# Patient Record
Sex: FEMALE | Race: WHITE | NOT HISPANIC OR LATINO | Employment: OTHER | URBAN - METROPOLITAN AREA
[De-identification: names, ages, dates, MRNs, and addresses within clinical notes are randomized per-mention and may not be internally consistent; named-entity substitution may affect disease eponyms.]

---

## 2020-06-09 ENCOUNTER — COMMUNICATION - HEALTHEAST (OUTPATIENT)
Dept: CARDIOLOGY | Facility: CLINIC | Age: 70
End: 2020-06-09

## 2020-09-17 ENCOUNTER — RECORDS - HEALTHEAST (OUTPATIENT)
Dept: LAB | Facility: CLINIC | Age: 70
End: 2020-09-17

## 2020-09-17 LAB
ALBUMIN SERPL-MCNC: 4 G/DL (ref 3.5–5)
ALP SERPL-CCNC: 75 U/L (ref 45–120)
ALT SERPL W P-5'-P-CCNC: <9 U/L (ref 0–45)
ANION GAP SERPL CALCULATED.3IONS-SCNC: 11 MMOL/L (ref 5–18)
AST SERPL W P-5'-P-CCNC: 20 U/L (ref 0–40)
BILIRUB SERPL-MCNC: 0.3 MG/DL (ref 0–1)
BUN SERPL-MCNC: 11 MG/DL (ref 8–28)
CALCIUM SERPL-MCNC: 9.4 MG/DL (ref 8.5–10.5)
CHLORIDE BLD-SCNC: 104 MMOL/L (ref 98–107)
CHOLEST SERPL-MCNC: 171 MG/DL
CO2 SERPL-SCNC: 27 MMOL/L (ref 22–31)
CREAT SERPL-MCNC: 0.76 MG/DL (ref 0.6–1.1)
FASTING STATUS PATIENT QL REPORTED: NORMAL
GFR SERPL CREATININE-BSD FRML MDRD: >60 ML/MIN/1.73M2
GLUCOSE BLD-MCNC: 107 MG/DL (ref 70–125)
HDLC SERPL-MCNC: 56 MG/DL
LDLC SERPL CALC-MCNC: 87 MG/DL
POTASSIUM BLD-SCNC: 4.2 MMOL/L (ref 3.5–5)
PROT SERPL-MCNC: 7.3 G/DL (ref 6–8)
SODIUM SERPL-SCNC: 142 MMOL/L (ref 136–145)
TRIGL SERPL-MCNC: 141 MG/DL

## 2020-09-18 LAB — 25(OH)D3 SERPL-MCNC: 54.7 NG/ML (ref 30–80)

## 2021-10-04 ENCOUNTER — LAB REQUISITION (OUTPATIENT)
Dept: LAB | Facility: CLINIC | Age: 71
End: 2021-10-04
Payer: MEDICARE

## 2021-10-04 DIAGNOSIS — E78.2 MIXED HYPERLIPIDEMIA: ICD-10-CM

## 2021-10-04 DIAGNOSIS — R82.90 UNSPECIFIED ABNORMAL FINDINGS IN URINE: ICD-10-CM

## 2021-10-04 LAB
ALBUMIN SERPL-MCNC: 3.9 G/DL (ref 3.5–5)
ALP SERPL-CCNC: 70 U/L (ref 45–120)
ALT SERPL W P-5'-P-CCNC: <9 U/L (ref 0–45)
ANION GAP SERPL CALCULATED.3IONS-SCNC: 14 MMOL/L (ref 5–18)
AST SERPL W P-5'-P-CCNC: 19 U/L (ref 0–40)
BILIRUB SERPL-MCNC: 0.3 MG/DL (ref 0–1)
BUN SERPL-MCNC: 13 MG/DL (ref 8–28)
CALCIUM SERPL-MCNC: 9.8 MG/DL (ref 8.5–10.5)
CHLORIDE BLD-SCNC: 101 MMOL/L (ref 98–107)
CHOLEST SERPL-MCNC: 165 MG/DL
CO2 SERPL-SCNC: 25 MMOL/L (ref 22–31)
CREAT SERPL-MCNC: 0.79 MG/DL (ref 0.6–1.1)
GFR SERPL CREATININE-BSD FRML MDRD: 74 ML/MIN/1.73M2
GLUCOSE BLD-MCNC: 111 MG/DL (ref 70–125)
HDLC SERPL-MCNC: 56 MG/DL
LDLC SERPL CALC-MCNC: 81 MG/DL
POTASSIUM BLD-SCNC: 3.5 MMOL/L (ref 3.5–5)
PROT SERPL-MCNC: 7.4 G/DL (ref 6–8)
SODIUM SERPL-SCNC: 140 MMOL/L (ref 136–145)
TRIGL SERPL-MCNC: 141 MG/DL

## 2021-10-04 PROCEDURE — 80053 COMPREHEN METABOLIC PANEL: CPT | Mod: ORL | Performed by: FAMILY MEDICINE

## 2021-10-04 PROCEDURE — 80061 LIPID PANEL: CPT | Mod: ORL | Performed by: FAMILY MEDICINE

## 2021-10-04 PROCEDURE — 87086 URINE CULTURE/COLONY COUNT: CPT | Mod: ORL | Performed by: FAMILY MEDICINE

## 2021-10-07 LAB — BACTERIA UR CULT: ABNORMAL

## 2022-10-03 ENCOUNTER — HOSPITAL ENCOUNTER (INPATIENT)
Facility: HOSPITAL | Age: 72
LOS: 6 days | Discharge: HOME | DRG: 193 | End: 2022-10-09
Attending: EMERGENCY MEDICINE | Admitting: INTERNAL MEDICINE
Payer: MEDICARE

## 2022-10-03 ENCOUNTER — OFFICE VISIT (OUTPATIENT)
Dept: FAMILY MEDICINE | Facility: CLINIC | Age: 72
End: 2022-10-03
Payer: MEDICARE

## 2022-10-03 ENCOUNTER — APPOINTMENT (OUTPATIENT)
Dept: RADIOLOGY | Facility: HOSPITAL | Age: 72
DRG: 193 | End: 2022-10-03
Attending: EMERGENCY MEDICINE
Payer: MEDICARE

## 2022-10-03 VITALS
DIASTOLIC BLOOD PRESSURE: 87 MMHG | OXYGEN SATURATION: 88 % | SYSTOLIC BLOOD PRESSURE: 152 MMHG | TEMPERATURE: 98.2 F | HEART RATE: 96 BPM | RESPIRATION RATE: 14 BRPM

## 2022-10-03 DIAGNOSIS — J18.9 PNEUMONIA DUE TO INFECTIOUS ORGANISM, UNSPECIFIED LATERALITY, UNSPECIFIED PART OF LUNG: Primary | ICD-10-CM

## 2022-10-03 DIAGNOSIS — J45.21 MILD INTERMITTENT ASTHMA WITH EXACERBATION: Primary | ICD-10-CM

## 2022-10-03 DIAGNOSIS — R09.02 HYPOXIA: ICD-10-CM

## 2022-10-03 DIAGNOSIS — I10 ESSENTIAL HYPERTENSION: ICD-10-CM

## 2022-10-03 DIAGNOSIS — J18.9 COMMUNITY ACQUIRED PNEUMONIA, UNSPECIFIED LATERALITY: ICD-10-CM

## 2022-10-03 LAB
ANION GAP SERPL CALCULATED.3IONS-SCNC: 16 MMOL/L (ref 7–15)
BUN SERPL-MCNC: 11.1 MG/DL (ref 8–23)
CALCIUM SERPL-MCNC: 9.2 MG/DL (ref 8.8–10.2)
CHLORIDE SERPL-SCNC: 90 MMOL/L (ref 98–107)
CREAT SERPL-MCNC: 0.72 MG/DL (ref 0.51–0.95)
DEPRECATED HCO3 PLAS-SCNC: 28 MMOL/L (ref 22–29)
ERYTHROCYTE [DISTWIDTH] IN BLOOD BY AUTOMATED COUNT: 13.3 % (ref 10–15)
FLUAV RNA SPEC QL NAA+PROBE: NEGATIVE
FLUBV RNA RESP QL NAA+PROBE: NEGATIVE
GFR SERPL CREATININE-BSD FRML MDRD: 87 ML/MIN/1.73M2
GLUCOSE SERPL-MCNC: 116 MG/DL (ref 70–99)
HCT VFR BLD AUTO: 36.7 % (ref 35–47)
HGB BLD-MCNC: 12.2 G/DL (ref 11.7–15.7)
MCH RBC QN AUTO: 30.9 PG (ref 26.5–33)
MCHC RBC AUTO-ENTMCNC: 33.2 G/DL (ref 31.5–36.5)
MCV RBC AUTO: 93 FL (ref 78–100)
PLATELET # BLD AUTO: 440 10E3/UL (ref 150–450)
POTASSIUM SERPL-SCNC: 3.7 MMOL/L (ref 3.4–5.3)
RBC # BLD AUTO: 3.95 10E6/UL (ref 3.8–5.2)
RSV RNA SPEC NAA+PROBE: NEGATIVE
SARS-COV-2 RNA RESP QL NAA+PROBE: NEGATIVE
SODIUM SERPL-SCNC: 134 MMOL/L (ref 136–145)
WBC # BLD AUTO: 18.8 10E3/UL (ref 4–11)

## 2022-10-03 PROCEDURE — 94640 AIRWAY INHALATION TREATMENT: CPT | Mod: 76

## 2022-10-03 PROCEDURE — 96367 TX/PROPH/DG ADDL SEQ IV INF: CPT

## 2022-10-03 PROCEDURE — 258N000003 HC RX IP 258 OP 636: Performed by: EMERGENCY MEDICINE

## 2022-10-03 PROCEDURE — 120N000001 HC R&B MED SURG/OB

## 2022-10-03 PROCEDURE — 96375 TX/PRO/DX INJ NEW DRUG ADDON: CPT

## 2022-10-03 PROCEDURE — 99285 EMERGENCY DEPT VISIT HI MDM: CPT | Mod: 25

## 2022-10-03 PROCEDURE — 85027 COMPLETE CBC AUTOMATED: CPT | Performed by: EMERGENCY MEDICINE

## 2022-10-03 PROCEDURE — 250N000009 HC RX 250: Performed by: EMERGENCY MEDICINE

## 2022-10-03 PROCEDURE — 96366 THER/PROPH/DIAG IV INF ADDON: CPT

## 2022-10-03 PROCEDURE — 99223 1ST HOSP IP/OBS HIGH 75: CPT | Performed by: INTERNAL MEDICINE

## 2022-10-03 PROCEDURE — 250N000009 HC RX 250: Performed by: INTERNAL MEDICINE

## 2022-10-03 PROCEDURE — 250N000011 HC RX IP 250 OP 636: Performed by: EMERGENCY MEDICINE

## 2022-10-03 PROCEDURE — 250N000013 HC RX MED GY IP 250 OP 250 PS 637: Performed by: INTERNAL MEDICINE

## 2022-10-03 PROCEDURE — 36415 COLL VENOUS BLD VENIPUNCTURE: CPT | Performed by: EMERGENCY MEDICINE

## 2022-10-03 PROCEDURE — 94640 AIRWAY INHALATION TREATMENT: CPT

## 2022-10-03 PROCEDURE — 999N000157 HC STATISTIC RCP TIME EA 10 MIN

## 2022-10-03 PROCEDURE — 96365 THER/PROPH/DIAG IV INF INIT: CPT

## 2022-10-03 PROCEDURE — C9803 HOPD COVID-19 SPEC COLLECT: HCPCS

## 2022-10-03 PROCEDURE — 99207 PR INPT ADMISSION FROM CLINIC: CPT | Performed by: FAMILY MEDICINE

## 2022-10-03 PROCEDURE — 87637 SARSCOV2&INF A&B&RSV AMP PRB: CPT | Performed by: EMERGENCY MEDICINE

## 2022-10-03 PROCEDURE — 93005 ELECTROCARDIOGRAM TRACING: CPT | Performed by: EMERGENCY MEDICINE

## 2022-10-03 PROCEDURE — 250N000011 HC RX IP 250 OP 636: Performed by: INTERNAL MEDICINE

## 2022-10-03 PROCEDURE — 82310 ASSAY OF CALCIUM: CPT | Performed by: EMERGENCY MEDICINE

## 2022-10-03 PROCEDURE — 71046 X-RAY EXAM CHEST 2 VIEWS: CPT

## 2022-10-03 PROCEDURE — 96376 TX/PRO/DX INJ SAME DRUG ADON: CPT

## 2022-10-03 RX ORDER — ROSUVASTATIN CALCIUM 5 MG/1
5 TABLET, COATED ORAL AT BEDTIME
COMMUNITY
Start: 2022-08-25

## 2022-10-03 RX ORDER — ASPIRIN 81 MG/1
81 TABLET ORAL AT BEDTIME
Status: ON HOLD | COMMUNITY
End: 2023-05-25

## 2022-10-03 RX ORDER — TRAZODONE HYDROCHLORIDE 50 MG/1
100 TABLET, FILM COATED ORAL AT BEDTIME
Status: DISCONTINUED | OUTPATIENT
Start: 2022-10-03 | End: 2022-10-09 | Stop reason: HOSPADM

## 2022-10-03 RX ORDER — ASPIRIN 81 MG/1
81 TABLET ORAL AT BEDTIME
Status: DISCONTINUED | OUTPATIENT
Start: 2022-10-03 | End: 2022-10-09 | Stop reason: HOSPADM

## 2022-10-03 RX ORDER — IBUPROFEN 200 MG
1 CAPSULE ORAL
COMMUNITY

## 2022-10-03 RX ORDER — ROSUVASTATIN CALCIUM 5 MG/1
5 TABLET, COATED ORAL AT BEDTIME
Status: DISCONTINUED | OUTPATIENT
Start: 2022-10-03 | End: 2022-10-09 | Stop reason: HOSPADM

## 2022-10-03 RX ORDER — IPRATROPIUM BROMIDE AND ALBUTEROL SULFATE 2.5; .5 MG/3ML; MG/3ML
3 SOLUTION RESPIRATORY (INHALATION) 4 TIMES DAILY PRN
Status: DISCONTINUED | OUTPATIENT
Start: 2022-10-03 | End: 2022-10-09 | Stop reason: HOSPADM

## 2022-10-03 RX ORDER — CEFTRIAXONE 1 G/1
1 INJECTION, POWDER, FOR SOLUTION INTRAMUSCULAR; INTRAVENOUS EVERY 24 HOURS
Status: DISCONTINUED | OUTPATIENT
Start: 2022-10-04 | End: 2022-10-08

## 2022-10-03 RX ORDER — METHYLPREDNISOLONE SODIUM SUCCINATE 40 MG/ML
40 INJECTION, POWDER, LYOPHILIZED, FOR SOLUTION INTRAMUSCULAR; INTRAVENOUS EVERY 8 HOURS
Status: DISCONTINUED | OUTPATIENT
Start: 2022-10-03 | End: 2022-10-07

## 2022-10-03 RX ORDER — IPRATROPIUM BROMIDE AND ALBUTEROL SULFATE 2.5; .5 MG/3ML; MG/3ML
3 SOLUTION RESPIRATORY (INHALATION)
Status: COMPLETED | OUTPATIENT
Start: 2022-10-03 | End: 2022-10-04

## 2022-10-03 RX ORDER — ENOXAPARIN SODIUM 100 MG/ML
40 INJECTION SUBCUTANEOUS EVERY 24 HOURS
Status: DISCONTINUED | OUTPATIENT
Start: 2022-10-03 | End: 2022-10-09 | Stop reason: HOSPADM

## 2022-10-03 RX ORDER — ONDANSETRON 4 MG/1
4 TABLET, ORALLY DISINTEGRATING ORAL EVERY 6 HOURS PRN
Status: DISCONTINUED | OUTPATIENT
Start: 2022-10-03 | End: 2022-10-09 | Stop reason: HOSPADM

## 2022-10-03 RX ORDER — LIDOCAINE 40 MG/G
CREAM TOPICAL
Status: DISCONTINUED | OUTPATIENT
Start: 2022-10-03 | End: 2022-10-09 | Stop reason: HOSPADM

## 2022-10-03 RX ORDER — CEFTRIAXONE 1 G/1
1 INJECTION, POWDER, FOR SOLUTION INTRAMUSCULAR; INTRAVENOUS ONCE
Status: COMPLETED | OUTPATIENT
Start: 2022-10-03 | End: 2022-10-03

## 2022-10-03 RX ORDER — METHYLPREDNISOLONE SODIUM SUCCINATE 40 MG/ML
40 INJECTION, POWDER, LYOPHILIZED, FOR SOLUTION INTRAMUSCULAR; INTRAVENOUS EVERY 12 HOURS
Status: DISCONTINUED | OUTPATIENT
Start: 2022-10-04 | End: 2022-10-03

## 2022-10-03 RX ORDER — TRAZODONE HYDROCHLORIDE 100 MG/1
100 TABLET ORAL AT BEDTIME
COMMUNITY
Start: 2022-08-24

## 2022-10-03 RX ORDER — LORAZEPAM 0.5 MG
1 TABLET ORAL DAILY
COMMUNITY

## 2022-10-03 RX ORDER — METHYLPREDNISOLONE SODIUM SUCCINATE 125 MG/2ML
80 INJECTION, POWDER, LYOPHILIZED, FOR SOLUTION INTRAMUSCULAR; INTRAVENOUS ONCE
Status: COMPLETED | OUTPATIENT
Start: 2022-10-03 | End: 2022-10-03

## 2022-10-03 RX ORDER — ACETAMINOPHEN 325 MG/1
650 TABLET ORAL EVERY 4 HOURS PRN
Status: DISCONTINUED | OUTPATIENT
Start: 2022-10-03 | End: 2022-10-09 | Stop reason: HOSPADM

## 2022-10-03 RX ORDER — ONDANSETRON 2 MG/ML
4 INJECTION INTRAMUSCULAR; INTRAVENOUS EVERY 6 HOURS PRN
Status: DISCONTINUED | OUTPATIENT
Start: 2022-10-03 | End: 2022-10-09 | Stop reason: HOSPADM

## 2022-10-03 RX ORDER — MULTIVITAMIN,THERAPEUTIC
1 TABLET ORAL DAILY
COMMUNITY

## 2022-10-03 RX ORDER — GUAIFENESIN/DEXTROMETHORPHAN 100-10MG/5
10 SYRUP ORAL EVERY 4 HOURS PRN
Status: DISCONTINUED | OUTPATIENT
Start: 2022-10-03 | End: 2022-10-09 | Stop reason: HOSPADM

## 2022-10-03 RX ORDER — IPRATROPIUM BROMIDE AND ALBUTEROL SULFATE 2.5; .5 MG/3ML; MG/3ML
3 SOLUTION RESPIRATORY (INHALATION) ONCE
Status: COMPLETED | OUTPATIENT
Start: 2022-10-03 | End: 2022-10-03

## 2022-10-03 RX ORDER — NYSTATIN 100000 [USP'U]/G
1 POWDER TOPICAL 2 TIMES DAILY PRN
COMMUNITY
Start: 2022-08-18 | End: 2022-12-09

## 2022-10-03 RX ADMIN — TRAZODONE HYDROCHLORIDE 100 MG: 100 TABLET ORAL at 21:36

## 2022-10-03 RX ADMIN — CEFTRIAXONE SODIUM 1 G: 1 INJECTION, POWDER, FOR SOLUTION INTRAMUSCULAR; INTRAVENOUS at 15:03

## 2022-10-03 RX ADMIN — IPRATROPIUM BROMIDE AND ALBUTEROL SULFATE 3 ML: 2.5; .5 SOLUTION RESPIRATORY (INHALATION) at 13:21

## 2022-10-03 RX ADMIN — ENOXAPARIN SODIUM 40 MG: 40 INJECTION SUBCUTANEOUS at 21:38

## 2022-10-03 RX ADMIN — METHYLPREDNISOLONE SODIUM SUCCINATE 81.25 MG: 125 INJECTION, POWDER, FOR SOLUTION INTRAMUSCULAR; INTRAVENOUS at 13:20

## 2022-10-03 RX ADMIN — ASPIRIN 81 MG: 81 TABLET, COATED ORAL at 21:37

## 2022-10-03 RX ADMIN — METHYLPREDNISOLONE 40 MG: 40 INJECTION, POWDER, LYOPHILIZED, FOR SOLUTION INTRAMUSCULAR; INTRAVENOUS at 21:32

## 2022-10-03 RX ADMIN — AZITHROMYCIN 500 MG: 500 INJECTION, POWDER, LYOPHILIZED, FOR SOLUTION INTRAVENOUS at 15:50

## 2022-10-03 RX ADMIN — IPRATROPIUM BROMIDE AND ALBUTEROL SULFATE 3 ML: 2.5; .5 SOLUTION RESPIRATORY (INHALATION) at 21:21

## 2022-10-03 RX ADMIN — ROSUVASTATIN CALCIUM 5 MG: 5 TABLET, FILM COATED ORAL at 21:36

## 2022-10-03 ASSESSMENT — ACTIVITIES OF DAILY LIVING (ADL)
ADLS_ACUITY_SCORE: 35

## 2022-10-03 NOTE — PROGRESS NOTES
OUTPATIENT VISIT NOTE                                                   Date of Visit: 10/3/2022     Chief Complaint   Patient presents with:  Cough: Unable to sleep, nausea, vomiting, fatigue x10 days             History of Present Illness   Naila Talley is a 72 year old female with  and daughter in law in for cough for the last 10 days.  Feels shortness of breath since started coughing.  Fever to 102.  Has had sweats.  No ear pain.  Sore throat improved.  Cough is productive.  Stomach upset  Poor appetite.  Drinking a lot of water.  Normal urination.       MEDICATIONS   Current Outpatient Medications   Medication     rosuvastatin (CRESTOR) 5 MG tablet     traZODone (DESYREL) 100 MG tablet     No current facility-administered medications for this visit.         SOCIAL HISTORY   Social History     Tobacco Use     Smoking status: Not on file     Smokeless tobacco: Not on file   Substance Use Topics     Alcohol use: Not on file           Physical Exam   Vitals:    10/03/22 1052   BP: (!) 152/87   BP Location: Left arm   Patient Position: Sitting   Cuff Size: Adult Regular   Pulse: 96   Resp: 14   Temp: 98.2  F (36.8  C)   TempSrc: Oral   SpO2: (!) 88%        GENERAL:   Alert. Oriented. Appears fatigued and slightly breathless  EYES: Clear  HENT:  Ears: R TM pearly gray. Normal landmarks. L TM pearly gray.  Normal landmarks  Nose: Clear.  Sinuses: Nontender.  Oropharynx:  No erythema. No exudate.  NECK: Supple. No adenopathy.  LUNGS: prolonged expiration with crackles, rhonchi throughout  HEART: RRR  SKIN:  No rash.          Assessment and Plan     Pneumonia due to infectious organism, unspecified laterality, unspecified part of lung      Hypoxia     Pneumonia by exam and history with hypoxia.  Patient sent to ER for further evaluation treatment.      Discussed signs / symptoms that warrant urgent / emergent medical attention.     Recheck if worsening or not improving.       Trae Mcconnell,  MD          Pertinent History     The following portions of the patient's history were reviewed and updated as appropriate: allergies, current medications, past family history, past medical history, past social history, past surgical history and problem list.

## 2022-10-03 NOTE — PHARMACY-ADMISSION MEDICATION HISTORY
Pharmacy Note - Admission Medication History    Pertinent Provider Information:      ______________________________________________________________________    Prior To Admission (PTA) med list completed and updated in EMR.       PTA Med List   Medication Sig Last Dose     aspirin 81 MG EC tablet Take 81 mg by mouth At Bedtime 10/2/2022 at Unknown time     calcium citrate (CITRACAL) 950 (200 Ca) MG tablet Take 1 tablet by mouth daily (with lunch) 10/2/2022 at Unknown time     cholecalciferol (VITAMIN D3) 25 mcg (1000 units) capsule Take 1 capsule by mouth daily (with dinner) 10/2/2022 at Unknown time     Multiple Vitamins-Minerals (OCUVITE PRESERVISION PO) Take 1 tablet by mouth daily (with dinner) 10/2/2022 at Unknown time     multivitamin, therapeutic (THERA-VIT) TABS tablet Take 1 tablet by mouth daily 10/2/2022 at Unknown time     nystatin (MYCOSTATIN) 491323 UNIT/GM external powder Apply 1 Application topically 2 times daily as needed prn     Omega-3-6-9 CAPS Take 1 capsule by mouth daily 10/2/2022 at Unknown time     rosuvastatin (CRESTOR) 5 MG tablet Take 5 mg by mouth At Bedtime 10/2/2022 at Unknown time     traZODone (DESYREL) 100 MG tablet Take 100 mg by mouth At Bedtime 10/2/2022 at Unknown time       Information source(s): Patient and [facility]  Method of interview communication: in-person    Summary of Changes to PTA Med List  New: all meds  Discontinued: n/a  Changed: n/a    Patient was asked about OTC/herbal products specifically.  PTA med list reflects this.    In the past week, patient estimated taking medication this percent of the time:  greater than 90%.    Allergies were reviewed, assessed, and updated with the patient.      Patient does not anticipate needing any multi-use medications during admission.    The information provided in this note is only as accurate as the sources available at the time of the update(s).    Thank you for the opportunity to participate in the care of  this patient.    Sherie Feldman [facility]  10/3/2022 2:19 PM

## 2022-10-03 NOTE — ED NOTES
Expected Patient Referral to ED  11:28 AM    Referring Clinic/Provider:  [facility]    Reason for referral/Clinical facts:  Patient with 10 days of productive cough, coarse lung sounds, hypoxia, concern for pneumonia.  No testing completed so far.    Recommendations provided:  Send to ED for further evaluation        Dwayne Craven MD  [facility] EMERGENCY DEPARTMENT  [address] [place] [phone]  [phone]       Dwayne Craven MD  10/03/22 1126

## 2022-10-03 NOTE — H&P
[facility]    History and Physical - Hospitalist Service       Date of Admission:  10/3/2022    Assessment & Plan      Naila Talley is a 72 year old female with a medical history of asthma presents to ER for cough and SOB for 10 days. Found to have community acquired pneumonia and hypoxia.     Community acquired pneumonia: Presents with fever, productive cough, SOB, hypoxia and leukocytosis. Chest XR reveals patchy opacities throughout the mid and lower lungs compatible with pneumonia. COVID19 negative.   - Started Ceftriaxone and Azithromycin in ER. Will continue   - Neb scheduled and prn   - Antitussive  - Sputum gram stain and culture    Acute asthma exacerbation: History of asthma. Patient reports that she uses her rescue inhaler occasionally. She has never needed to be treated with steroid.   - Start solumedrol 40 mg IV q8h. Nebs as above    Acute hypoxemic respiratory failure: due to pneumonia and asthma exacerbation  - Needed supplemental nasal cannula oxygen 2 LPM on admission. Will continue and taper as tolerated.         Diet:  Regular diet  DVT Prophylaxis: Enoxaparin (Lovenox) SQ  Liu Catheter: Not present  Central Lines: None  Cardiac Monitoring: None  Code Status:  Full code      Disposition Plan      Expected Discharge Date: 10/05/2022                The patient's care was discussed with the Bedside Nurse, Care Coordinator/ and Patient.    Kam Gaytan MD  Hospitalist Service  [facility]  Securely message with the Vocera Web Console (learn more here)  Text page via Daily Pic Paging/Directory         ______________________________________________________________________    Chief Complaint   Cough and SOB    History is obtained from the patient    History of Present Illness   Naila Talley is a 72 year old female with a medical history of asthma presents to ER for cough and SOB for 10 days. Patient reports that she developed productive  cough 10 days ago. He cough progressively gets worse. She further developed SOB and wheezing about one week. She reports feeling chills. She checked her temperature and it was 100.2. She reports no chest pain. She reports feeling nausea and vomited a few times. In ER, chest XR reveals pneumonia.      Review of Systems    The 10 point Review of Systems is negative other than noted in the HPI or here.     Past Medical History    I have reviewed this patient's medical history and updated it with pertinent information if needed.   Asthma    Past Surgical History   I have reviewed this patient's surgical history and updated it with pertinent information if needed.  No past surgical history on file.    Social History   I have reviewed this patient's social history and updated it with pertinent information if needed.       Family History   No pertinent family history    Prior to Admission Medications   Prior to Admission Medications   Prescriptions Last Dose Informant Patient Reported? Taking?   Multiple Vitamins-Minerals (OCUVITE PRESERVISION PO) 10/2/2022 at Unknown time  Yes Yes   Sig: Take 1 tablet by mouth daily (with dinner)   Omega-3-6-9 CAPS 10/2/2022 at Unknown time  Yes Yes   Sig: Take 1 capsule by mouth daily   aspirin 81 MG EC tablet 10/2/2022 at Unknown time  Yes Yes   Sig: Take 81 mg by mouth At Bedtime   calcium citrate (CITRACAL) 950 (200 Ca) MG tablet 10/2/2022 at Unknown time  Yes Yes   Sig: Take 1 tablet by mouth daily (with lunch)   cholecalciferol (VITAMIN D3) 25 mcg (1000 units) capsule 10/2/2022 at Unknown time  Yes Yes   Sig: Take 1 capsule by mouth daily (with dinner)   multivitamin, therapeutic (THERA-VIT) TABS tablet 10/2/2022 at Unknown time  Yes Yes   Sig: Take 1 tablet by mouth daily   nystatin (MYCOSTATIN) 891588 UNIT/GM external powder prn  Yes Yes   Sig: Apply 1 Application topically 2 times daily as needed   rosuvastatin (CRESTOR) 5 MG tablet 10/2/2022 at Unknown time  Yes Yes   Sig: Take  5 mg by mouth At Bedtime   traZODone (DESYREL) 100 MG tablet 10/2/2022 at Unknown time  Yes Yes   Sig: Take 100 mg by mouth At Bedtime      Facility-Administered Medications: None     Allergies   No Known Allergies    Physical Exam   Vital Signs: Temp: 98  F (36.7  C) Temp src: Oral BP: (!) 163/71 Pulse: 90   Resp: (!) 31 SpO2: 93 % O2 Device: Nasal cannula Oxygen Delivery: 2 LPM  Weight: 180 lbs 0 oz    General appearance: not in acute distress  HEENT: PERRL, EOMI  Lungs: Coarse breath sounds with wheezes in bilateral lung fields  Cardiovascular: Regular rate and rhythm, normal S1-S2  Abdomen: Soft, non tender, no distension, normal bowel sound  Musculoskeletal: No joint swelling  Skin: No rash and no edema  Neurology: AAO ×3.  Cranial nerves II - XII normal.  Normal muscle strength in all four extremities.    Data   Data reviewed today: I reviewed all medications, new labs and imaging results over the last 24 hours.     Recent Labs   Lab 10/03/22  1254   WBC 18.8*   HGB 12.2   MCV 93      *   POTASSIUM 3.7   CHLORIDE 90*   CO2 28   BUN 11.1   CR 0.72   ANIONGAP 16*   ROZINA 9.2   *       Chest XR:  IMPRESSION: Linear and ill-defined patchy opacities throughout the mid and lower lungs compatible with pneumonia in the proper clinical setting. No pleural fusion. Shallow inspiration and mild elevation left hemidiaphragm exaggerate heart size and   pulmonary vascularity which are probably within normal limits.

## 2022-10-03 NOTE — ED PROVIDER NOTES
EMERGENCY DEPARTMENT ENCOUNTER      NAME: Naila Talley  AGE: 72 year old female  YOB: 1950  MRN: [id number]  EVALUATION DATE & TIME: 10/3/2022 12:22 PM    PCP: Danelle Adam    ED PROVIDER: Dwayne Craven M.D.      Chief Complaint   Patient presents with     Cough       FINAL IMPRESSION:  1. Community acquired pneumonia, unspecified laterality        ED COURSE & MEDICAL DECISION MAKIN year old female presents to the Emergency Department for evaluation of cough and shortness of breath.  She has a productive cough for about 10 days.   is also sick with respiratory symptoms.  She is borderline hypoxemic to about 90% at rest here, does desat with any exertion less than 90%.  Lung sounds are coarse with expiratory wheezing.  She relays a remote history of asthma however does not take any normal controlling medications.  She did receive a DuoNeb and some Solu-Medrol with some improvement in her expiratory wheezing.  Chest x-ray does confirm bilateral opacities concerning for pneumonia.  Her COVID-19 test is negative.  Although her  is sick with similar symptoms, I wonder if she is developing some superimposed community-acquired pneumonia on top of a possible viral respiratory syndrome.  She does have an elevated white blood cell count 18.  She was started on treatment with ceftriaxone and azithromycin and will be admitted given her borderline oxygen saturations, increased work of breathing and age.  Discussed case with hospitalist    12:37 PM I met with the patient and performed my initial interview and exam  12:54 PM I rechecked the patient       At the conclusion of the encounter I discussed the results of all of the tests and the disposition. The questions were answered. The patient or family acknowledged understanding and was agreeable with the care plan.       MEDICATIONS GIVEN IN THE EMERGENCY:  Medications   aspirin EC tablet 81 mg (81 mg Oral Given 10/3/22 3317)    rosuvastatin (CRESTOR) tablet 5 mg (5 mg Oral Given 10/3/22 2136)   traZODone (DESYREL) tablet 100 mg (100 mg Oral Given 10/3/22 2136)   lidocaine 1 % 0.1-1 mL (has no administration in time range)   lidocaine (LMX4) cream (has no administration in time range)   sodium chloride (PF) 0.9% PF flush 3 mL (3 mLs Intracatheter Given 10/3/22 1826)   sodium chloride (PF) 0.9% PF flush 3 mL (has no administration in time range)   melatonin tablet 1 mg (has no administration in time range)   enoxaparin ANTICOAGULANT (LOVENOX) injection 40 mg (40 mg Subcutaneous Given 10/3/22 2138)   ondansetron (ZOFRAN ODT) ODT tab 4 mg (has no administration in time range)     Or   ondansetron (ZOFRAN) injection 4 mg (has no administration in time range)   azithromycin (ZITHROMAX) 500 mg in sodium chloride 0.9 % 250 mL intermittent infusion (has no administration in time range)   cefTRIAXone (ROCEPHIN) 1 g vial to attach to  mL bag for ADULTS or NS 50 mL bag for PEDS (has no administration in time range)   ipratropium - albuterol 0.5 mg/2.5 mg/3 mL (DUONEB) neb solution 3 mL (has no administration in time range)   acetaminophen (TYLENOL) tablet 650 mg (has no administration in time range)   methylPREDNISolone sodium succinate (solu-MEDROL) injection 40 mg (40 mg Intravenous Given 10/3/22 2132)   ipratropium - albuterol 0.5 mg/2.5 mg/3 mL (DUONEB) neb solution 3 mL (3 mLs Nebulization Given 10/3/22 2121)   guaiFENesin-dextromethorphan (ROBITUSSIN DM) 100-10 MG/5ML syrup 10 mL (has no administration in time range)   ipratropium - albuterol 0.5 mg/2.5 mg/3 mL (DUONEB) neb solution 3 mL (3 mLs Nebulization Given 10/3/22 1321)   methylPREDNISolone sodium succinate (solu-MEDROL) injection 81.25 mg (81.25 mg Intravenous Given 10/3/22 1320)   cefTRIAXone (ROCEPHIN) 1 g vial to attach to  mL bag for ADULTS or NS 50 mL bag for PEDS (0 g Intravenous Stopped 10/3/22 4382)   azithromycin (ZITHROMAX) 500 mg in sodium chloride 0.9 % 250 mL  intermittent infusion (0 mg Intravenous Stopped 10/3/22 0715)       NEW PRESCRIPTIONS STARTED AT TODAY'S ER VISIT  New Prescriptions    No medications on file          =================================================================    HPI    Patient information was obtained from: Patient     Use of : N/A       Naila Talley is a 72 year old female with pertinent history of asthma who presents to this ED walk-in for evaluation of cough     Patient reports being sick with a productive cough (with gray/green sputum) and shortness of breath (worsens with exertion) for 10 days. The patient also reports GI upset with a few episodes of emesis. Patient reports not using their inhaler for a while. Patient is fully vaccinated against COVID. Patient denies chest pain, leg swelling, or fever.     REVIEW OF SYSTEMS   All systems reviewed and negative except as noted in HPI.    PAST MEDICAL HISTORY:  No past medical history on file.    PAST SURGICAL HISTORY:  No past surgical history on file.        CURRENT MEDICATIONS:    Current Facility-Administered Medications   Medication     acetaminophen (TYLENOL) tablet 650 mg     aspirin EC tablet 81 mg     [START ON 10/4/2022] azithromycin (ZITHROMAX) 500 mg in sodium chloride 0.9 % 250 mL intermittent infusion     [START ON 10/4/2022] cefTRIAXone (ROCEPHIN) 1 g vial to attach to  mL bag for ADULTS or NS 50 mL bag for PEDS     enoxaparin ANTICOAGULANT (LOVENOX) injection 40 mg     guaiFENesin-dextromethorphan (ROBITUSSIN DM) 100-10 MG/5ML syrup 10 mL     ipratropium - albuterol 0.5 mg/2.5 mg/3 mL (DUONEB) neb solution 3 mL     ipratropium - albuterol 0.5 mg/2.5 mg/3 mL (DUONEB) neb solution 3 mL     lidocaine (LMX4) cream     lidocaine 1 % 0.1-1 mL     melatonin tablet 1 mg     methylPREDNISolone sodium succinate (solu-MEDROL) injection 40 mg     ondansetron (ZOFRAN ODT) ODT tab 4 mg    Or     ondansetron (ZOFRAN) injection 4 mg     rosuvastatin (CRESTOR) tablet 5  "mg     sodium chloride (PF) 0.9% PF flush 3 mL     sodium chloride (PF) 0.9% PF flush 3 mL     traZODone (DESYREL) tablet 100 mg     Current Outpatient Medications   Medication     aspirin 81 MG EC tablet     calcium citrate (CITRACAL) 950 (200 Ca) MG tablet     cholecalciferol (VITAMIN D3) 25 mcg (1000 units) capsule     Multiple Vitamins-Minerals (OCUVITE PRESERVISION PO)     multivitamin, therapeutic (THERA-VIT) TABS tablet     nystatin (MYCOSTATIN) 899083 UNIT/GM external powder     Omega-3-6-9 CAPS     rosuvastatin (CRESTOR) 5 MG tablet     traZODone (DESYREL) 100 MG tablet         ALLERGIES:  No Known Allergies    FAMILY HISTORY:  No family history on file.    SOCIAL HISTORY:   Social History     Socioeconomic History     Marital status:        VITALS:  BP (!) 144/67   Pulse 107   Temp 98  F (36.7  C) (Oral)   Resp 18   Ht 1.651 m (5' 5\")   Wt 81.6 kg (180 lb)   SpO2 94%   BMI 29.95 kg/m      PHYSICAL EXAM    Constitutional: Well developed, Well nourished, mild respiratory distress  HENT: Normocephalic, Atraumatic. Neck Supple.  Eyes: EOMI, Conjunctiva normal.  Respiratory: Mildly increased work of breathing.  Expiratory wheezing in all lung fields and coarse lung sounds throughout.  Cardiovascular: Normal heart rate, Regular rhythm. No peripheral edema.  Abdomen: Soft  Musculoskeletal: Good range of motion in all major joints. No major deformities noted.  Integument: Warm, Dry.  Neurologic: Alert & awake, Normal motor function, Normal sensory function, No focal deficits noted.   Psychiatric: Cooperative. Affect appropriate.     LAB:  All pertinent labs reviewed and interpreted.  Labs Ordered and Resulted from Time of ED Arrival to Time of ED Departure   CBC WITH PLATELETS - Abnormal       Result Value    WBC Count 18.8 (*)     RBC Count 3.95      Hemoglobin 12.2      Hematocrit 36.7      MCV 93      MCH 30.9      MCHC 33.2      RDW 13.3      Platelet Count 440     BASIC METABOLIC PANEL - " Abnormal    Sodium 134 (*)     Potassium 3.7      Chloride 90 (*)     Carbon Dioxide (CO2) 28      Anion Gap 16 (*)     Urea Nitrogen 11.1      Creatinine 0.72      Calcium 9.2      Glucose 116 (*)     GFR Estimate 87     INFLUENZA A/B & SARS-COV2 PCR MULTIPLEX - Normal    Influenza A PCR Negative      Influenza B PCR Negative      RSV PCR Negative      SARS CoV2 PCR Negative     RESPIRATORY AEROBIC BACTERIAL CULTURE       RADIOLOGY:  Reviewed all pertinent imaging. Please see official radiology report.  XR Chest 2 Views   Final Result   IMPRESSION: Linear and ill-defined patchy opacities throughout the mid and lower lungs compatible with pneumonia in the proper clinical setting. No pleural fusion. Shallow inspiration and mild elevation left hemidiaphragm exaggerate heart size and    pulmonary vascularity which are probably within normal limits.          EKG:    Performed at: 1220    Impression: Normal EKG    Rate: 97  Rhythm: Normal sinus rhythm   Axis: 65  TX Interval: 128  QRS Interval: 72  QTc Interval: 434  ST Changes: None  Comparison: N/A    I have independently reviewed and interpreted the EKG(s) documented above.        I, Roxann Vargas, am serving as a scribe to document services personally performed by Dr. Dwayne Craven, based on my observation and the provider's statements to me. I, Dwayne Craven MD attest that Roxann Vargas is acting in a scribe capacity, has observed my performance of the services and has documented them in accordance with my direction.    Dwayne Craven M.D.  Emergency Medicine  [facility] EMERGENCY DEPARTMENT  [address] [place] [id number] [phone]  Dept: [phone]     Dwayne Craven MD  10/03/22 7824

## 2022-10-03 NOTE — ED TRIAGE NOTES
Pt was at clinic across the street and sent over for evaluation of cough. Pt c/o cough x 10 days. Pt sent here for eval for pneumonia and told she had low o2 sats. O2 in triage is 92%. Pt c/o low sleep.

## 2022-10-04 LAB
ATRIAL RATE - MUSE: 97 BPM
DIASTOLIC BLOOD PRESSURE - MUSE: NORMAL MMHG
ERYTHROCYTE [DISTWIDTH] IN BLOOD BY AUTOMATED COUNT: 13.5 % (ref 10–15)
HCT VFR BLD AUTO: 34.4 % (ref 35–47)
HGB BLD-MCNC: 11.4 G/DL (ref 11.7–15.7)
HOLD SPECIMEN: NORMAL
INTERPRETATION ECG - MUSE: NORMAL
MCH RBC QN AUTO: 31.1 PG (ref 26.5–33)
MCHC RBC AUTO-ENTMCNC: 33.1 G/DL (ref 31.5–36.5)
MCV RBC AUTO: 94 FL (ref 78–100)
P AXIS - MUSE: 8 DEGREES
PLATELET # BLD AUTO: 415 10E3/UL (ref 150–450)
PR INTERVAL - MUSE: 128 MS
QRS DURATION - MUSE: 72 MS
QT - MUSE: 342 MS
QTC - MUSE: 434 MS
R AXIS - MUSE: 65 DEGREES
RBC # BLD AUTO: 3.67 10E6/UL (ref 3.8–5.2)
SYSTOLIC BLOOD PRESSURE - MUSE: NORMAL MMHG
T AXIS - MUSE: 70 DEGREES
VENTRICULAR RATE- MUSE: 97 BPM
WBC # BLD AUTO: 16.7 10E3/UL (ref 4–11)

## 2022-10-04 PROCEDURE — 999N000157 HC STATISTIC RCP TIME EA 10 MIN

## 2022-10-04 PROCEDURE — 99233 SBSQ HOSP IP/OBS HIGH 50: CPT | Performed by: INTERNAL MEDICINE

## 2022-10-04 PROCEDURE — 120N000001 HC R&B MED SURG/OB

## 2022-10-04 PROCEDURE — 96376 TX/PRO/DX INJ SAME DRUG ADON: CPT

## 2022-10-04 PROCEDURE — 250N000009 HC RX 250: Performed by: INTERNAL MEDICINE

## 2022-10-04 PROCEDURE — 250N000011 HC RX IP 250 OP 636: Performed by: INTERNAL MEDICINE

## 2022-10-04 PROCEDURE — 36415 COLL VENOUS BLD VENIPUNCTURE: CPT | Performed by: INTERNAL MEDICINE

## 2022-10-04 PROCEDURE — 258N000003 HC RX IP 258 OP 636: Performed by: INTERNAL MEDICINE

## 2022-10-04 PROCEDURE — 85027 COMPLETE CBC AUTOMATED: CPT | Performed by: INTERNAL MEDICINE

## 2022-10-04 PROCEDURE — 94640 AIRWAY INHALATION TREATMENT: CPT

## 2022-10-04 PROCEDURE — 250N000013 HC RX MED GY IP 250 OP 250 PS 637: Performed by: INTERNAL MEDICINE

## 2022-10-04 PROCEDURE — 94640 AIRWAY INHALATION TREATMENT: CPT | Mod: 76

## 2022-10-04 RX ORDER — HYDRALAZINE HYDROCHLORIDE 20 MG/ML
10 INJECTION INTRAMUSCULAR; INTRAVENOUS EVERY 6 HOURS PRN
Status: DISCONTINUED | OUTPATIENT
Start: 2022-10-04 | End: 2022-10-09 | Stop reason: HOSPADM

## 2022-10-04 RX ADMIN — ROSUVASTATIN CALCIUM 5 MG: 5 TABLET, FILM COATED ORAL at 21:45

## 2022-10-04 RX ADMIN — GUAIFENESIN AND DEXTROMETHORPHAN 10 ML: 100; 10 SYRUP ORAL at 21:45

## 2022-10-04 RX ADMIN — IPRATROPIUM BROMIDE AND ALBUTEROL SULFATE 3 ML: 2.5; .5 SOLUTION RESPIRATORY (INHALATION) at 07:07

## 2022-10-04 RX ADMIN — METHYLPREDNISOLONE 40 MG: 40 INJECTION, POWDER, LYOPHILIZED, FOR SOLUTION INTRAMUSCULAR; INTRAVENOUS at 14:04

## 2022-10-04 RX ADMIN — GUAIFENESIN AND DEXTROMETHORPHAN 10 ML: 100; 10 SYRUP ORAL at 09:51

## 2022-10-04 RX ADMIN — AZITHROMYCIN 500 MG: 500 INJECTION, POWDER, LYOPHILIZED, FOR SOLUTION INTRAVENOUS at 14:52

## 2022-10-04 RX ADMIN — METHYLPREDNISOLONE 40 MG: 40 INJECTION, POWDER, LYOPHILIZED, FOR SOLUTION INTRAMUSCULAR; INTRAVENOUS at 05:55

## 2022-10-04 RX ADMIN — ENOXAPARIN SODIUM 40 MG: 40 INJECTION SUBCUTANEOUS at 20:14

## 2022-10-04 RX ADMIN — IPRATROPIUM BROMIDE AND ALBUTEROL SULFATE 3 ML: 2.5; .5 SOLUTION RESPIRATORY (INHALATION) at 11:01

## 2022-10-04 RX ADMIN — ACETAMINOPHEN 650 MG: 325 TABLET, FILM COATED ORAL at 09:51

## 2022-10-04 RX ADMIN — IPRATROPIUM BROMIDE AND ALBUTEROL SULFATE 3 ML: 2.5; .5 SOLUTION RESPIRATORY (INHALATION) at 15:57

## 2022-10-04 RX ADMIN — ASPIRIN 81 MG: 81 TABLET, COATED ORAL at 21:45

## 2022-10-04 RX ADMIN — TRAZODONE HYDROCHLORIDE 100 MG: 100 TABLET ORAL at 21:45

## 2022-10-04 RX ADMIN — CEFTRIAXONE SODIUM 1 G: 1 INJECTION, POWDER, FOR SOLUTION INTRAMUSCULAR; INTRAVENOUS at 13:41

## 2022-10-04 RX ADMIN — METHYLPREDNISOLONE 40 MG: 40 INJECTION, POWDER, LYOPHILIZED, FOR SOLUTION INTRAMUSCULAR; INTRAVENOUS at 20:12

## 2022-10-04 ASSESSMENT — ACTIVITIES OF DAILY LIVING (ADL)
ADLS_ACUITY_SCORE: 35
CHANGE_IN_FUNCTIONAL_STATUS_SINCE_ONSET_OF_CURRENT_ILLNESS/INJURY: NO
FALL_HISTORY_WITHIN_LAST_SIX_MONTHS: NO
DIFFICULTY_EATING/SWALLOWING: NO
DOING_ERRANDS_INDEPENDENTLY_DIFFICULTY: NO
ADLS_ACUITY_SCORE: 24
ADLS_ACUITY_SCORE: 35
DIFFICULTY_COMMUNICATING: NO
ADLS_ACUITY_SCORE: 24
WEAR_GLASSES_OR_BLIND: YES
ADLS_ACUITY_SCORE: 35
ADLS_ACUITY_SCORE: 38
TOILETING_ISSUES: NO
ADLS_ACUITY_SCORE: 35
CONCENTRATING,_REMEMBERING_OR_MAKING_DECISIONS_DIFFICULTY: NO
ADLS_ACUITY_SCORE: 23
ADLS_ACUITY_SCORE: 24
ADLS_ACUITY_SCORE: 23
ADLS_ACUITY_SCORE: 35
DRESSING/BATHING_DIFFICULTY: NO
WALKING_OR_CLIMBING_STAIRS_DIFFICULTY: NO
VISION_MANAGEMENT: GOOD
HEARING_DIFFICULTY_OR_DEAF: NO
ADLS_ACUITY_SCORE: 35

## 2022-10-04 NOTE — PLAN OF CARE
"  Problem: Plan of Care - These are the overarching goals to be used throughout the patient stay.    Goal: Plan of Care Review/Shift Note  Description: The Plan of Care Review/Shift note should be completed every shift.  The Outcome Evaluation is a brief statement about your assessment that the patient is improving, declining, or no change.  This information will be displayed automatically on your shift note.  Outcome: Ongoing, Progressing  Flowsheets (Taken 10/4/2022 144)  Plan of Care Reviewed With:   patient   spouse  Goal: Patient-Specific Goal (Individualized)  Description: You can add care plan individualizations to a care plan. Examples of Individualization might be:  \"Parent requests to be called daily at 9am for status\", \"I have a hard time hearing out of my right ear\", or \"Do not touch me to wake me up as it startles me\".  Outcome: Ongoing, Progressing  Goal: Absence of Hospital-Acquired Illness or Injury  Outcome: Ongoing, Progressing  Intervention: Identify and Manage Fall Risk  Recent Flowsheet Documentation  Taken 10/4/2022 1441 by Grayson Styles, RN  Safety Promotion/Fall Prevention: activity supervised  Goal: Optimal Comfort and Wellbeing  Outcome: Ongoing, Progressing  Goal: Readiness for Transition of Care  Outcome: Ongoing, Progressing  Intervention: Mutually Develop Transition Plan  Recent Flowsheet Documentation  Taken 10/4/2022 1444 by Grayson Styles, RN  Equipment Currently Used at Home: none     Problem: Risk for Delirium  Goal: Optimal Coping  Outcome: Ongoing, Progressing  Goal: Improved Behavioral Control  Outcome: Ongoing, Progressing  Goal: Improved Attention and Thought Clarity  Outcome: Ongoing, Progressing  Goal: Improved Sleep  Outcome: Ongoing, Progressing   Goal Outcome Evaluation:    Plan of Care Reviewed With: patient, spouse                 "

## 2022-10-04 NOTE — PROGRESS NOTES
"Sitting up in chair, O2 4 lpm/NC, SpO2 94 %, BS scattered moderate I/E wheezes/rhonchi, crackles LLL. Duoneb tx given, tolerated well, BS unchanged afte. RT to follow    /65 (BP Location: Right arm)   Pulse 81   Temp 97.6  F (36.4  C) (Oral)   Resp 18   Ht 1.651 m (5' 5\")   Wt 81.6 kg (179 lb 14.3 oz)   SpO2 94%   BMI 29.94 kg/m      "

## 2022-10-04 NOTE — PLAN OF CARE
Problem: Plan of Care - These are the overarching goals to be used throughout the patient stay.    Goal: Absence of Hospital-Acquired Illness or Injury  Outcome: Ongoing, Progressing  Intervention: Identify and Manage Fall Risk  Recent Flowsheet Documentation  Taken 10/4/2022 0945 by Constance Lane RN  Safety Promotion/Fall Prevention:   activity supervised   assistive device/personal items within reach   nonskid shoes/slippers when out of bed   patient and family education   room door open   room near nurse's station   safety round/check completed  Intervention: Prevent Skin Injury  Recent Flowsheet Documentation  Taken 10/4/2022 0945 by Constance Lane RN  Body Position: position changed independently  Intervention: Prevent and Manage VTE (Venous Thromboembolism) Risk  Recent Flowsheet Documentation  Taken 10/4/2022 0945 by Constance Lane RN  Activity Management:   activity adjusted per tolerance   bedrest with bathroom privileges     Problem: Plan of Care - These are the overarching goals to be used throughout the patient stay.    Goal: Absence of Hospital-Acquired Illness or Injury  Intervention: Prevent Skin Injury  Recent Flowsheet Documentation  Taken 10/4/2022 0945 by Constance Lane RN  Body Position: position changed independently     Problem: Plan of Care - These are the overarching goals to be used throughout the patient stay.    Goal: Absence of Hospital-Acquired Illness or Injury  Intervention: Prevent and Manage VTE (Venous Thromboembolism) Risk  Recent Flowsheet Documentation  Taken 10/4/2022 0945 by Constance Lane RN  Activity Management:   activity adjusted per tolerance   bedrest with bathroom privileges     Problem: Plan of Care - These are the overarching goals to be used throughout the patient stay.    Goal: Optimal Comfort and Wellbeing  Outcome: Ongoing, Progressing  Intervention: Monitor Pain and Promote Comfort  Recent Flowsheet Documentation  Taken 10/4/2022 0951 by Constance Lane  RN  Pain Management Interventions: medication (see MAR)     Problem: Plan of Care - These are the overarching goals to be used throughout the patient stay.    Goal: Readiness for Transition of Care  Outcome: Ongoing, Progressing     Problem: Risk for Delirium  Goal: Optimal Coping  Outcome: Ongoing, Progressing   Goal Outcome Evaluation:        Pt a/o x4, pleasant, cooperative. Reported soreness in left side of lower abdomen, sharp when coughing. Tylenol and cough syrup given. Loose np cough noted. Remains on o2 at 4L per n/c. Up to bathroom w/ stand by assist. Ate well for meals. Reminded pt to use call light for needs. Report given to Grayson Ray on p4. Pt transferring to [facility] at this time via stretcher w/ belongings.

## 2022-10-04 NOTE — PROGRESS NOTES
[facility]    Medicine Progress Note - Hospitalist Service    Date of Admission:  10/3/2022    Assessment & Plan     Naila Talley is a 72 year old female with a medical history of asthma presents to ER for cough and SOB for 10 days. Found to have community acquired pneumonia and hypoxia.      Community acquired pneumonia: Presents with fever, productive cough, SOB, hypoxia and leukocytosis. Chest XR reveals patchy opacities throughout the mid and lower lungs compatible with pneumonia. COVID19 negative.   - Started Ceftriaxone and Azithromycin in ER. Will continue   - Neb scheduled and prn   - Antitussive  - Sputum gram stain and culture     Acute asthma exacerbation: History of asthma. Patient reports that she uses her rescue inhaler occasionally. She has never needed to be treated with steroid.   - Patient remains having a lot of congestion and wheezing today. Continue solumedrol 40 mg IV q8h. Nebs as above     Acute hypoxemic respiratory failure: due to pneumonia and asthma exacerbation  - Needed supplemental nasal cannula oxygen 2 LPM on admission. It increased to 4 LPM overnight. Will continue and taper as tolerated.       Diet: Regular Diet Adult    DVT Prophylaxis: Enoxaparin (Lovenox) SQ  Liu Catheter: Not present  Central Lines: None  Cardiac Monitoring: None  Code Status: Full Code      Disposition Plan      Expected Discharge Date: 10/06/2022    Discharge Delays: IV Medication - consider oral or Home Infusion  Oxygen Needs - Arrange Home O2            The patient's care was discussed with the Bedside Nurse, Care Coordinator/ and Patient.    Kam Gaytan MD  Hospitalist Service  [facility]  Securely message with the Vocera Web Console (learn more here)  Text page via [employer] Paging/Directory         ______________________________________________________________________    Interval History   Patient feels that her SOB somewhat gets better.  But she continues to have a lot of congestion and wheezing. No chest pain. She remains on supplemental oxygen 4 LPM.     Data reviewed today: I reviewed all medications, new labs and imaging results over the last 24 hours.   Physical Exam   Vital Signs: Temp: 97.6  F (36.4  C) Temp src: Oral BP: 129/65 Pulse: 81   Resp: 18 SpO2: 94 % O2 Device: Nasal cannula Oxygen Delivery: 4 LPM  Weight: 179 lbs 14.33 oz    General appearance: not in acute distress  HEENT: PERRL, EOMI  Lungs: Coarse breath sounds with wheezes in bilateral lung fields  Cardiovascular: Regular rate and rhythm, normal S1-S2  Abdomen: Soft, non tender, no distension, normal bowel sound  Musculoskeletal: No joint swelling  Skin: No rash and no edema  Neurology: AAO ×3.  Cranial nerves II - XII normal.  Normal muscle strength in all four extremities.    Data   Recent Labs   Lab 10/04/22  1535 10/03/22  1254   WBC 16.7* 18.8*   HGB 11.4* 12.2   MCV 94 93    440   NA  --  134*   POTASSIUM  --  3.7   CHLORIDE  --  90*   CO2  --  28   BUN  --  11.1   CR  --  0.72   ANIONGAP  --  16*   ROZINA  --  9.2   GLC  --  116*

## 2022-10-04 NOTE — PROGRESS NOTES
[facility]AT Treatment Plan    Patient Score: 4  Patient Acuity: 5    Clinical Indication for Therapy: Asthma exac    Therapy Ordered: Duoneb QID    Assessment Summary: Gave pt neb.  Pre nebs BS diminished, post had increased aeration with some exp wheezing.  RR WNL.  Sats 93% on 4L, was just increased do to coughing.  Complains of 10 day cough.  Will keep current nebs regimen    Yimi Flores, RT  10/3/2022

## 2022-10-04 NOTE — PROGRESS NOTES
"PT is currently on 4L NC with an SpO2 of 93%. Does not use O2 at home.  Uses inhalers at home.  Breathing pattern dyspnea on exertion Breath sounds expiratory wheezes pre and post neb.  Improved aeration post neb. Cough type frequent, congested, loose. Sputum Type Unknown  Duoneb nebulizer given x2.  Gave incentive spirometry treaching.  She was able to achieve 1250ml on the IS.  PT tolerated treatments well.  Wean O2.  RT will continue to follow.      /61 (BP Location: Right arm, Patient Position: Semi-Fish's, Cuff Size: Adult Large)   Pulse 83   Temp 97.2  F (36.2  C) (Oral)   Resp 16   Ht 1.651 m (5' 5\")   Wt 81.6 kg (180 lb)   SpO2 98%   BMI 29.95 kg/m      Parrish Navarro, RT  10/4/2022      "

## 2022-10-05 LAB — NT-PROBNP SERPL-MCNC: 569 PG/ML (ref 0–900)

## 2022-10-05 PROCEDURE — 36415 COLL VENOUS BLD VENIPUNCTURE: CPT | Performed by: NURSE PRACTITIONER

## 2022-10-05 PROCEDURE — 94640 AIRWAY INHALATION TREATMENT: CPT | Mod: 76

## 2022-10-05 PROCEDURE — 999N000157 HC STATISTIC RCP TIME EA 10 MIN

## 2022-10-05 PROCEDURE — 120N000001 HC R&B MED SURG/OB

## 2022-10-05 PROCEDURE — 250N000009 HC RX 250: Performed by: INTERNAL MEDICINE

## 2022-10-05 PROCEDURE — 99232 SBSQ HOSP IP/OBS MODERATE 35: CPT | Performed by: INTERNAL MEDICINE

## 2022-10-05 PROCEDURE — 83880 ASSAY OF NATRIURETIC PEPTIDE: CPT | Performed by: NURSE PRACTITIONER

## 2022-10-05 PROCEDURE — 99207 PR CDG-CUT & PASTE-POTENTIAL IMPACT ON LEVEL: CPT | Performed by: INTERNAL MEDICINE

## 2022-10-05 PROCEDURE — 99221 1ST HOSP IP/OBS SF/LOW 40: CPT | Performed by: INTERNAL MEDICINE

## 2022-10-05 PROCEDURE — 258N000003 HC RX IP 258 OP 636: Performed by: INTERNAL MEDICINE

## 2022-10-05 PROCEDURE — 94640 AIRWAY INHALATION TREATMENT: CPT

## 2022-10-05 PROCEDURE — 250N000013 HC RX MED GY IP 250 OP 250 PS 637: Performed by: NURSE PRACTITIONER

## 2022-10-05 PROCEDURE — 250N000013 HC RX MED GY IP 250 OP 250 PS 637: Performed by: INTERNAL MEDICINE

## 2022-10-05 PROCEDURE — 250N000011 HC RX IP 250 OP 636: Performed by: INTERNAL MEDICINE

## 2022-10-05 RX ORDER — FLUTICASONE PROPIONATE 50 MCG
2 SPRAY, SUSPENSION (ML) NASAL DAILY
Status: DISCONTINUED | OUTPATIENT
Start: 2022-10-05 | End: 2022-10-09 | Stop reason: HOSPADM

## 2022-10-05 RX ORDER — IPRATROPIUM BROMIDE AND ALBUTEROL SULFATE 2.5; .5 MG/3ML; MG/3ML
3 SOLUTION RESPIRATORY (INHALATION)
Status: COMPLETED | OUTPATIENT
Start: 2022-10-05 | End: 2022-10-06

## 2022-10-05 RX ORDER — BUDESONIDE 1 MG/2ML
1 INHALANT ORAL 2 TIMES DAILY
Status: DISCONTINUED | OUTPATIENT
Start: 2022-10-05 | End: 2022-10-05

## 2022-10-05 RX ORDER — PANTOPRAZOLE SODIUM 40 MG/1
40 TABLET, DELAYED RELEASE ORAL
Status: DISCONTINUED | OUTPATIENT
Start: 2022-10-05 | End: 2022-10-09 | Stop reason: HOSPADM

## 2022-10-05 RX ORDER — SODIUM CHLORIDE FOR INHALATION 3 %
3 VIAL, NEBULIZER (ML) INHALATION
Status: DISCONTINUED | OUTPATIENT
Start: 2022-10-05 | End: 2022-10-09 | Stop reason: HOSPADM

## 2022-10-05 RX ORDER — ACETYLCYSTEINE 200 MG/ML
2 SOLUTION ORAL; RESPIRATORY (INHALATION) 4 TIMES DAILY
Status: DISPENSED | OUTPATIENT
Start: 2022-10-05 | End: 2022-10-06

## 2022-10-05 RX ORDER — BUDESONIDE 0.5 MG/2ML
1 INHALANT ORAL 2 TIMES DAILY
Status: DISCONTINUED | OUTPATIENT
Start: 2022-10-05 | End: 2022-10-05 | Stop reason: CLARIF

## 2022-10-05 RX ADMIN — PANTOPRAZOLE SODIUM 40 MG: 40 TABLET, DELAYED RELEASE ORAL at 16:31

## 2022-10-05 RX ADMIN — IPRATROPIUM BROMIDE AND ALBUTEROL SULFATE 3 ML: .5; 3 SOLUTION RESPIRATORY (INHALATION) at 08:44

## 2022-10-05 RX ADMIN — ROSUVASTATIN CALCIUM 5 MG: 5 TABLET, FILM COATED ORAL at 21:25

## 2022-10-05 RX ADMIN — METHYLPREDNISOLONE 40 MG: 40 INJECTION, POWDER, LYOPHILIZED, FOR SOLUTION INTRAMUSCULAR; INTRAVENOUS at 04:00

## 2022-10-05 RX ADMIN — TRAZODONE HYDROCHLORIDE 100 MG: 100 TABLET ORAL at 22:48

## 2022-10-05 RX ADMIN — IPRATROPIUM BROMIDE AND ALBUTEROL SULFATE 3 ML: .5; 3 SOLUTION RESPIRATORY (INHALATION) at 20:11

## 2022-10-05 RX ADMIN — CEFTRIAXONE SODIUM 1 G: 1 INJECTION, POWDER, FOR SOLUTION INTRAMUSCULAR; INTRAVENOUS at 14:33

## 2022-10-05 RX ADMIN — UMECLIDINIUM 1 PUFF: 62.5 AEROSOL, POWDER ORAL at 21:29

## 2022-10-05 RX ADMIN — ASPIRIN 81 MG: 81 TABLET, COATED ORAL at 21:25

## 2022-10-05 RX ADMIN — ENOXAPARIN SODIUM 40 MG: 40 INJECTION SUBCUTANEOUS at 21:29

## 2022-10-05 RX ADMIN — METHYLPREDNISOLONE 40 MG: 40 INJECTION, POWDER, LYOPHILIZED, FOR SOLUTION INTRAMUSCULAR; INTRAVENOUS at 14:33

## 2022-10-05 RX ADMIN — ACETYLCYSTEINE 2 ML: 200 SOLUTION ORAL; RESPIRATORY (INHALATION) at 16:38

## 2022-10-05 RX ADMIN — ACETYLCYSTEINE 2 ML: 200 SOLUTION ORAL; RESPIRATORY (INHALATION) at 13:04

## 2022-10-05 RX ADMIN — IPRATROPIUM BROMIDE AND ALBUTEROL SULFATE 3 ML: .5; 3 SOLUTION RESPIRATORY (INHALATION) at 13:03

## 2022-10-05 RX ADMIN — FLUTICASONE PROPIONATE 2 SPRAY: 50 SPRAY, METERED NASAL at 21:28

## 2022-10-05 RX ADMIN — AZITHROMYCIN 500 MG: 500 INJECTION, POWDER, LYOPHILIZED, FOR SOLUTION INTRAVENOUS at 15:13

## 2022-10-05 RX ADMIN — METHYLPREDNISOLONE 40 MG: 40 INJECTION, POWDER, LYOPHILIZED, FOR SOLUTION INTRAMUSCULAR; INTRAVENOUS at 21:31

## 2022-10-05 RX ADMIN — GUAIFENESIN AND DEXTROMETHORPHAN 10 ML: 100; 10 SYRUP ORAL at 03:56

## 2022-10-05 RX ADMIN — IPRATROPIUM BROMIDE AND ALBUTEROL SULFATE 3 ML: .5; 3 SOLUTION RESPIRATORY (INHALATION) at 16:37

## 2022-10-05 ASSESSMENT — ACTIVITIES OF DAILY LIVING (ADL)
ADLS_ACUITY_SCORE: 24

## 2022-10-05 NOTE — PROGRESS NOTES
"Sitting up in chair, O2 4 lpm/NC, SpO2 93 %, BS diminished with diffuse E/wheezes and scattered rhonchi, Duoneb/Mucomyst HHN tx given (given late d/t PRN tx given at 0844 and mucomyst not available earlier).  Tolerated fair with frequent coughing with Mucomyst tx. Pt completed most of the mucomyst. BS bilat E/wheezes and scattered rhonchi after. RT to follow    /61 (BP Location: Left arm)   Pulse 80   Temp 97.6  F (36.4  C) (Oral)   Resp 20   Ht 1.651 m (5' 5\")   Wt 81.6 kg (179 lb 14.3 oz)   SpO2 93%   BMI 29.94 kg/m      "

## 2022-10-05 NOTE — PLAN OF CARE
"  Problem: Plan of Care - These are the overarching goals to be used throughout the patient stay.    Goal: Plan of Care Review/Shift Note  Description: The Plan of Care Review/Shift note should be completed every shift.  The Outcome Evaluation is a brief statement about your assessment that the patient is improving, declining, or no change.  This information will be displayed automatically on your shift note.  Outcome: Ongoing, Progressing  Goal: Patient-Specific Goal (Individualized)  Description: You can add care plan individualizations to a care plan. Examples of Individualization might be:  \"Parent requests to be called daily at 9am for status\", \"I have a hard time hearing out of my right ear\", or \"Do not touch me to wake me up as it startles me\".  Outcome: Ongoing, Progressing  Goal: Absence of Hospital-Acquired Illness or Injury  Outcome: Ongoing, Progressing  Intervention: Identify and Manage Fall Risk  Recent Flowsheet Documentation  Taken 10/5/2022 0045 by Patty Henry, RN  Safety Promotion/Fall Prevention:   activity supervised   bed alarm on   lighting adjusted  Intervention: Prevent Skin Injury  Recent Flowsheet Documentation  Taken 10/5/2022 0045 by Patty Henry, RN  Body Position: position changed independently  Intervention: Prevent and Manage VTE (Venous Thromboembolism) Risk  Recent Flowsheet Documentation  Taken 10/5/2022 0045 by Patty Henry, RN  Activity Management: activity adjusted per tolerance  Goal: Optimal Comfort and Wellbeing  Outcome: Ongoing, Progressing  Goal: Readiness for Transition of Care  Outcome: Ongoing, Progressing     Problem: Risk for Delirium  Goal: Optimal Coping  Outcome: Ongoing, Progressing  Goal: Improved Behavioral Control  Outcome: Ongoing, Progressing  Goal: Improved Attention and Thought Clarity  Outcome: Ongoing, Progressing  Goal: Improved Sleep  Outcome: Ongoing, Progressing     Problem: Fluid Imbalance (Pneumonia)  Goal: Fluid Balance  Outcome: " Ongoing, Progressing     Problem: Infection (Pneumonia)  Goal: Resolution of Infection Signs and Symptoms  Outcome: Ongoing, Progressing     Problem: Respiratory Compromise (Pneumonia)  Goal: Effective Oxygenation and Ventilation  Outcome: Ongoing, Progressing  Intervention: Promote Airway Secretion Clearance  Recent Flowsheet Documentation  Taken 10/5/2022 0045 by Patty Henry, RN  Cough And Deep Breathing: done independently per patient  Intervention: Optimize Oxygenation and Ventilation  Recent Flowsheet Documentation  Taken 10/5/2022 0045 by Patty Henry, RN  Head of Bed (HOB) Positioning: HOB at 20 degrees     Problem: Gas Exchange Impaired  Goal: Optimal Gas Exchange  Outcome: Ongoing, Progressing  Intervention: Optimize Oxygenation and Ventilation  Recent Flowsheet Documentation  Taken 10/5/2022 0045 by Patty Henry, RN  Head of Bed (HOB) Positioning: HOB at 20 degrees   Goal Outcome Evaluation:        Pt alert without c/o pain. Lungs coarse rhonchi with frequent cough. Prn robitussin given.   O2 sats maintaining 91-93% on 4L nc. Will monitor.

## 2022-10-05 NOTE — PLAN OF CARE
Problem: Gas Exchange Impaired  Goal: Optimal Gas Exchange  Outcome: Ongoing, Not Progressing     Problem: Infection (Pneumonia)  Goal: Resolution of Infection Signs and Symptoms  Outcome: Ongoing, Progressing     Goal Outcome Evaluation:      Pt presented to hospital on 10/03 with cough, shortness of breath and oxygen desatting with activity. Chest x-ray positive for pneumonia. Receiving IV abx in the form of Rocephin and Azithromycin. WBC trending down to 16.7. Afebrile. She has a dry congested cough. Received prn Robitussin at 2145. Lungs are coarse throughout. Maintaining O2 sats >92% on 4 liters oxygen via NC. Short of breath with exertion. Receiving IV steroids. Using incentive spirometer at bedside with encouragement. SBA with ambulation in room. A&O x4. Denies pain. Appetite is adequate. She is tolerating a regular diet. Continent of bowel and bladder.

## 2022-10-05 NOTE — CONSULTS
INITIAL PULMONARY   10/5/2022      Admit Date: 10/3/2022  Hospital Day: 2   CODE: Full Code    Reason for Consult: pneumonia      Assessment/Plan:   Naila Talley is a 72 year old female with a self reported history of asthma and chronic cough admitted for pneumonia and hypoxia. She presented with fever, productive cough, SOB, hypoxia, and leukocytosis. CXR showed bilteral opacities, COVID, flu, and RSV swabs negative and her oxygen requirements are noted to be worsening. She endorses chest tightness, cough, and PND. The etiologies for her symptoms include; reactive airways disease exacerbation from some other viral or bacterial trigger, heart failure (less likely), incomplete treatment for presumed reactive airways.    1. Presumed asthma exacerbation: As above.    Sputum culture with gram stain.    Start LAMA (Incruse) inhaler. She has previously not tolerated ICS/LABA inhalers.      Start Flonase daily    Agree with as needed WALT    Agree with solumedrol presently, given ongoing escalating oxygen requirements with not presently taper but will anticipate tapering over next few days if clinic situation allows.     Start protonix 40mg daily     Once stable for discharge we will arrange to see her in clinic and will obtain formal PFTs at that time.     BNP is elevated, will order echocardiogram     2. Health Maintenance     Update pneumonia vaccine before discharge, pneumovax 20    Thank you for this consult, we will continue to follow.                                                                                                                                                               HPI:   CCx:  Chief Complaint   Patient presents with     Cough       HPI: Naila Talley is a 72 year old female with a medical history of asthma presented to the ER on 10/3/22 for cough and SOB for 10 days. She was found to have community acquired pneumonia and hypoxia. Chest x-ray does confirmed bilateral opacities  concerning for pneumonia, COVID, RSV, and ifluenza tests are negative. Her sats in the ED were initially 90% at rest but she began to desat with any activity. She was placed on 2L nc and eventually increased to 4L nc to maintain sats. She was started on treatment with ceftriaxone and azithromycin, has been getting solumedrol q8h, mucomyst QID, and duonebs QID. She is still wheezing and congested today.  She is not normally on any maintenance medications for her asthma. Per patient she had asthma as a child and has tried multiple ICS/LABA inhalers with no help to her symptoms that eventually cause thrush or sore throat. She previously lived in [place] and has not had asthma management since moving back to [place] 3 years ago.     Her chronic cough does not seem to have a trigger, is dry, and is not worse at certain time of day. She will use cough drops with some relief.   Eats dinner around 6pm, goes to bed at 11pm, does not snack. Denies reflux.   She does endorse needing to clear her throat and post nasal drip. These symptoms have been worse lately.   Denies history of heart failure, minimal LE edema in the past.       ROS: Pertinent positives alluded to in the HPI. Remainder of 10 point ROS is negative.                                                                                                                                                       Medical/Surgical history:  1. Asthma       Allergies:  Reviewed in Epic    PTA medications:  Medications Prior to Admission   Medication Sig Dispense Refill Last Dose     aspirin 81 MG EC tablet Take 81 mg by mouth At Bedtime   10/2/2022 at Unknown time     calcium citrate (CITRACAL) 950 (200 Ca) MG tablet Take 1 tablet by mouth daily (with lunch)   10/2/2022 at Unknown time     cholecalciferol (VITAMIN D3) 25 mcg (1000 units) capsule Take 1 capsule by mouth daily (with dinner)   10/2/2022 at Unknown time     Multiple Vitamins-Minerals (OCUVITE PRESERVISION PO) Take 1  "tablet by mouth daily (with dinner)   10/2/2022 at Unknown time     multivitamin, therapeutic (THERA-VIT) TABS tablet Take 1 tablet by mouth daily   10/2/2022 at Unknown time     nystatin (MYCOSTATIN) 111040 UNIT/GM external powder Apply 1 Application topically 2 times daily as needed   prn     Omega-3-6-9 CAPS Take 1 capsule by mouth daily   10/2/2022 at Unknown time     rosuvastatin (CRESTOR) 5 MG tablet Take 5 mg by mouth At Bedtime   10/2/2022 at Unknown time     traZODone (DESYREL) 100 MG tablet Take 100 mg by mouth At Bedtime   10/2/2022 at Unknown time       Family Hx:    Social Hx:  Resides in a house, no concern for mold.  Pets: no  Smoking history: never smoker  Alcohol use: none   Recreational drug use: none  [place]:   Recent Travel:     Exam/Data:   ROS: 10 point ROS obtained, pertinant positives alluded to in HPI    Vitals  BP (!) 149/63 (BP Location: Left arm)   Pulse 88   Temp 98.2  F (36.8  C) (Oral)   Resp 17   Ht 1.651 m (5' 5\")   Wt 81.6 kg (179 lb 14.3 oz)   SpO2 93%   BMI 29.94 kg/m       I/O last 3 completed shifts:  In: 1790 [P.O.:1790]  Out: -   Weight change: -0.047 kg (-1.7 oz)    EXAM:  Physical Exam  Constitutional:       General: She is not in acute distress.     Appearance: She is not ill-appearing or diaphoretic.   HENT:      Nose: Nose normal.   Cardiovascular:      Rate and Rhythm: Normal rate and regular rhythm.      Pulses: Normal pulses.      Heart sounds: Normal heart sounds.   Pulmonary:      Effort: Pulmonary effort is normal. No respiratory distress.      Breath sounds: No stridor. Wheezing (L>R) and rhonchi (bilateral bases) present.   Musculoskeletal:      Right lower leg: Edema (mild) present.      Left lower leg: Edema (mild) present.   Skin:     General: Skin is warm and dry.      Findings: No rash.   Neurological:      Mental Status: She is alert.   Psychiatric:         Behavior: Behavior normal.           Medications:       acetylcysteine  2 mL Nebulization 4x " Daily     aspirin  81 mg Oral At Bedtime     azithromycin  500 mg Intravenous Q24H     cefTRIAXone  1 g Intravenous Q24H     enoxaparin ANTICOAGULANT  40 mg Subcutaneous Q24H     ipratropium - albuterol 0.5 mg/2.5 mg/3 mL  3 mL Nebulization 4x daily     methylPREDNISolone  40 mg Intravenous Q8H     pantoprazole  40 mg Oral QAM AC     rosuvastatin  5 mg Oral At Bedtime     sodium chloride (PF)  3 mL Intracatheter Q8H     traZODone  100 mg Oral At Bedtime     umeclidinium  1 puff Inhalation Daily         DATA  All laboratory and radiology has been personally reviewed by myself today.  Recent Labs   Lab 10/04/22  1535   WBC 16.7*   HGB 11.4*   HCT 34.4*        Recent Labs   Lab 10/03/22  1254   *   CO2 28   BUN 11.1      Latest Reference Range & Units 10/05/22 15:13   N-Terminal Pro BNP Inpatient 0 - 900 pg/mL 569     MICRO:        PFT DATA:  No previous PFT data available.       IMAGING:     EXAM: XR CHEST 2 VIEWS  LOCATION: [facility]  DATE/TIME: 10/3/2022 12:48 PM  INDICATION: Dyspnea.  COMPARISON: None.                                                               IMPRESSION: Linear and ill-defined patchy opacities throughout the mid and lower lungs compatible with pneumonia in the proper clinical setting. No pleural fusion. Shallow inspiration and mild elevation left hemidiaphragm exaggerate heart size and   pulmonary vascularity which are probably within normal limits.             Edita Layne  Pulm/CC  [id number]

## 2022-10-05 NOTE — CONSULTS
INITIAL PULMONARY   10/5/2022        Admit Date: 10/3/2022  Hospital Day: 2   CODE: Full Code     Reason for Consult: pneumonia        Assessment/Plan:   Naila Talley is a 72 year old female with a self reported history of asthma and chronic cough admitted for pneumonia and hypoxia. She presented with fever, productive cough, SOB, hypoxia, and leukocytosis. CXR showed bilteral opacities, COVID, flu, and RSV swabs negative and her oxygen requirements are noted to be worsening. She endorses chest tightness, cough, and PND. The etiologies for her symptoms include; reactive airways disease exacerbation from some other viral or bacterial trigger, heart failure (less likely), incomplete treatment for presumed reactive airways.     1. Presumed asthma exacerbation: As above.    Sputum culture with gram stain.    Start LAMA (Incruse) inhaler. She has previously not tolerated ICS/LABA inhalers due to a sore throat.      Start Flonase daily    Agree with as needed WALT    Agree with solumedrol presently, given ongoing escalating oxygen requirements with not presently taper but will anticipate tapering over next few days if clinic situation allows.     Start protonix 40mg daily     Once stable for discharge we will arrange to see her in clinic and will obtain formal PFTs at that time.     BNP is elevated, will order echocardiogram.      2. Health Maintenance   ? Update pneumonia vaccine before discharge, pneumovax 20     Thank you for this consult, we will continue to follow.          Edita Layne MD  Pulmonary and Critical Care  (P) [phone]        HPI:   CCx: Cough    HPI: Naila Talley is a 72 year old female with a medical history of asthma presented to the ER on 10/3/22 for cough and SOB for 10 days. She was found to have community acquired pneumonia and hypoxia. Chest x-ray does confirmed bilateral opacities concerning for pneumonia, COVID, RSV, and ifluenza tests are negative. Her sats in the ED were  initially 90% at rest but she began to desat with any activity. She was placed on 2L nc and eventually increased to 4L nc to maintain sats. She was started on treatment with ceftriaxone and azithromycin, has been getting solumedrol q8h, mucomyst QID, and duonebs QID. She is still wheezing and congested today.  She is not normally on any maintenance medications for her asthma. Per patient she had asthma as a child and has tried multiple ICS/LABA inhalers with no help to her symptoms that eventually cause thrush or sore throat. She previously lived in [place] and has not had asthma management since moving back to [place] 3 years ago.      Her chronic cough does not seem to have a trigger, is dry, and is not worse at certain time of day. She will use cough drops with some relief.   Eats dinner around 6pm, goes to bed at 11pm, does not snack. Denies reflux.   She does endorse needing to clear her throat and post nasal drip. These symptoms have been worse lately.   Denies history of heart failure, minimal LE miguel ángel        ROS: Pertinent positives alluded to in the HPI. Remainder of 10 point ROS is negative.                                                                                                                                                                                    Medical/Surgical history:  1. Asthma         Allergies:  Reviewed in Epic     PTA medications:  Prescriptions Prior to Admission           Medications Prior to Admission   Medication Sig Dispense Refill Last Dose     aspirin 81 MG EC tablet Take 81 mg by mouth At Bedtime     10/2/2022 at Unknown time     calcium citrate (CITRACAL) 950 (200 Ca) MG tablet Take 1 tablet by mouth daily (with lunch)     10/2/2022 at Unknown time     cholecalciferol (VITAMIN D3) 25 mcg (1000 units) capsule Take 1 capsule by mouth daily (with dinner)     10/2/2022 at Unknown time     Multiple Vitamins-Minerals (OCUVITE PRESERVISION PO) Take 1 tablet by mouth daily (with  "dinner)     10/2/2022 at Unknown time     multivitamin, therapeutic (THERA-VIT) TABS tablet Take 1 tablet by mouth daily     10/2/2022 at Unknown time     nystatin (MYCOSTATIN) 866086 UNIT/GM external powder Apply 1 Application topically 2 times daily as needed     prn     Omega-3-6-9 CAPS Take 1 capsule by mouth daily     10/2/2022 at Unknown time     rosuvastatin (CRESTOR) 5 MG tablet Take 5 mg by mouth At Bedtime     10/2/2022 at Unknown time     traZODone (DESYREL) 100 MG tablet Take 100 mg by mouth At Bedtime     10/2/2022 at Unknown time            Family Hx:     Social Hx:  Resides in a house, no concern for mold.  Pets: no  Smoking history: never smoker  Alcohol use: none   Recreational drug use: none  [place]:   Recent Travel:      Exam/Data:   ROS: 10 point ROS obtained, pertinant positives alluded to in HPI     Vitals  BP (!) 149/63 (BP Location: Left arm)   Pulse 88   Temp 98.2  F (36.8  C) (Oral)   Resp 17   Ht 1.651 m (5' 5\")   Wt 81.6 kg (179 lb 14.3 oz)   SpO2 93%   BMI 29.94 kg/m    I/O last 3 completed shifts:  In: 1790 [P.O.:1790]  Out: -   Weight change: -0.047 kg (-1.7 oz)     EXAM:  Physical Exam  Constitutional:       General: She is not in acute distress.     Appearance: She is not ill-appearing or diaphoretic.   HENT:      Nose: Nose normal.   Cardiovascular:      Rate and Rhythm: Normal rate and regular rhythm.      Pulses: Normal pulses.      Heart sounds: Normal heart sounds.   Pulmonary:      Effort: Pulmonary effort is normal. No respiratory distress.      Breath sounds: No stridor. Wheezing (L>R) and rhonchi (bilateral bases) present.   Musculoskeletal:      Right lower leg: Edema (mild) present.      Left lower leg: Edema (mild) present.   Skin:     General: Skin is warm and dry.      Findings: No rash.   Neurological:      Mental Status: She is alert.   Psychiatric:         Behavior: Behavior normal.               Medications:     acetylcysteine  2 mL Nebulization 4x Daily     " aspirin  81 mg Oral At Bedtime     azithromycin  500 mg Intravenous Q24H     cefTRIAXone  1 g Intravenous Q24H     enoxaparin ANTICOAGULANT  40 mg Subcutaneous Q24H     ipratropium - albuterol 0.5 mg/2.5 mg/3 mL  3 mL Nebulization 4x daily     methylPREDNISolone  40 mg Intravenous Q8H     pantoprazole  40 mg Oral QAM AC     rosuvastatin  5 mg Oral At Bedtime     sodium chloride (PF)  3 mL Intracatheter Q8H     traZODone  100 mg Oral At Bedtime     umeclidinium  1 puff Inhalation Daily            DATA  All laboratory and radiology has been personally reviewed by myself today.      Recent Labs   Lab 10/04/22  1535   WBC 16.7*   HGB 11.4*   HCT 34.4*             Recent Labs   Lab 10/03/22  1254   *   CO2 28   BUN 11.1        Latest Reference Range & Units 10/05/22 15:13   N-Terminal Pro BNP Inpatient 0 - 900 pg/mL 569      MICRO:           PFT DATA:  No previous PFT data available.         IMAGING:      EXAM: XR CHEST 2 VIEWS  LOCATION: [facility]  DATE/TIME: 10/3/2022 12:48 PM  INDICATION: Dyspnea.  COMPARISON: None.                                                               IMPRESSION: Linear and ill-defined patchy opacities throughout the mid and lower lungs compatible with pneumonia in the proper clinical setting. No pleural fusion. Shallow inspiration and mild elevation left hemidiaphragm exaggerate heart size and   pulmonary vascularity which are probably within normal limits.                Edita Layne  Pulm/CC  [id number]

## 2022-10-05 NOTE — PROGRESS NOTES
"Sitting up in chair, O2 4 lpm/NC, SpO2 91 %. BS diminished bilat. Duoneb/Mucomyst HHN tx given. Tight moist np cough. Productive cough increases with Mucomyst, swallows. Tolerated fair, pt complains that hte Mucomyst nauseates her and would like to stop this medication. Will send a text message to MD, perhaps change to Nebusal. BS scattered rhonchi and crackles. after, RT to follow    BP (!) 149/63 (BP Location: Left arm)   Pulse 88   Temp 98.2  F (36.8  C) (Oral)   Resp 17   Ht 1.651 m (5' 5\")   Wt 81.6 kg (179 lb 14.3 oz)   SpO2 91%   BMI 29.94 kg/m          "

## 2022-10-05 NOTE — PROGRESS NOTES
[facility]    Medicine Progress Note - Hospitalist Service    Date of Admission:  10/3/2022    Assessment & Plan     Naila Talley is a 72 year old female with a medical history of asthma presents to ER for cough and SOB for 10 days. Found to have community acquired pneumonia and hypoxia.      Community acquired pneumonia: Presents with fever, productive cough, SOB, hypoxia and leukocytosis. Chest XR reveals patchy opacities throughout the mid and lower lungs compatible with pneumonia. COVID19 negative.   - Started Ceftriaxone and Azithromycin in ER. Will continue   - Duoneb scheduled and prn   - Start mucomyst neb  - Antitussive  - Sputum gram stain and culture     Acute asthma exacerbation: History of asthma. Patient reports that she uses her rescue inhaler occasionally. She has never needed to be treated with steroid.   - Patient remains having a lot of congestion and wheezing today. Continue solumedrol 40 mg IV q8h. Nebs as above. Pulmonary consult     Acute hypoxemic respiratory failure: due to pneumonia and asthma exacerbation  - Needed supplemental nasal cannula oxygen 2 LPM on admission. It currently has increased to 4 LPMt. Will continue and taper as tolerated.       Diet: Regular Diet Adult    DVT Prophylaxis: Enoxaparin (Lovenox) SQ  Liu Catheter: Not present  Central Lines: None  Cardiac Monitoring: None  Code Status: Full Code      Disposition Plan      Expected Discharge Date: 10/07/2022    Discharge Delays: IV Medication - consider oral or Home Infusion  Oxygen Needs - Arrange Home O2            The patient's care was discussed with the Bedside Nurse, Care Coordinator/ and Patient.    Kam Gaytan MD  Hospitalist Service  [facility]  Securely message with the Vocera Web Console (learn more here)  Text page via [facility] Paging/Directory         ______________________________________________________________________    Interval History    Patient reports that she continues to have SOB. She feels congested and has a lot of wheezing. Compared to 2 days ago, she does not feel that her symptoms have significant improvement. She remains on supplemental oxygen 4 LPM.     Data reviewed today: I reviewed all medications, new labs and imaging results over the last 24 hours.   Physical Exam   Vital Signs: Temp: 97.6  F (36.4  C) Temp src: Oral BP: 128/61 Pulse: 80   Resp: 20 SpO2: 93 % O2 Device: Nasal cannula Oxygen Delivery: 4 LPM  Weight: 179 lbs 14.33 oz    General appearance: not in acute distress  HEENT: PERRL, EOMI  Lungs: Coarse breath sounds with wheezes in bilateral lung fields  Cardiovascular: Regular rate and rhythm, normal S1-S2  Abdomen: Soft, non tender, no distension  Musculoskeletal: No joint swelling  Skin: No rash and no edema  Neurology: AAO ×3.  Cranial nerves II - XII normal.  Normal muscle strength in all four extremities.    Data   Recent Labs   Lab 10/04/22  1535 10/03/22  1254   WBC 16.7* 18.8*   HGB 11.4* 12.2   MCV 94 93    440   NA  --  134*   POTASSIUM  --  3.7   CHLORIDE  --  90*   CO2  --  28   BUN  --  11.1   CR  --  0.72   ANIONGAP  --  16*   ROZINA  --  9.2   GLC  --  116*

## 2022-10-06 ENCOUNTER — APPOINTMENT (OUTPATIENT)
Dept: CT IMAGING | Facility: HOSPITAL | Age: 72
DRG: 193 | End: 2022-10-06
Attending: INTERNAL MEDICINE
Payer: MEDICARE

## 2022-10-06 ENCOUNTER — APPOINTMENT (OUTPATIENT)
Dept: RADIOLOGY | Facility: HOSPITAL | Age: 72
DRG: 193 | End: 2022-10-06
Payer: MEDICARE

## 2022-10-06 ENCOUNTER — APPOINTMENT (OUTPATIENT)
Dept: CARDIOLOGY | Facility: HOSPITAL | Age: 72
DRG: 193 | End: 2022-10-06
Attending: NURSE PRACTITIONER
Payer: MEDICARE

## 2022-10-06 LAB
ALBUMIN SERPL BCG-MCNC: 3 G/DL (ref 3.5–5.2)
ALBUMIN UR-MCNC: NEGATIVE MG/DL
ALP SERPL-CCNC: 122 U/L (ref 35–104)
ALT SERPL W P-5'-P-CCNC: 68 U/L (ref 10–35)
ANION GAP SERPL CALCULATED.3IONS-SCNC: 8 MMOL/L (ref 7–15)
APPEARANCE UR: CLEAR
AST SERPL W P-5'-P-CCNC: 79 U/L (ref 10–35)
BACTERIA #/AREA URNS HPF: NORMAL /HPF
BILIRUB SERPL-MCNC: 0.2 MG/DL
BILIRUB UR QL STRIP: NEGATIVE
BUN SERPL-MCNC: 12.2 MG/DL (ref 8–23)
CALCIUM SERPL-MCNC: 8.9 MG/DL (ref 8.8–10.2)
CHLORIDE SERPL-SCNC: 99 MMOL/L (ref 98–107)
COLOR UR AUTO: COLORLESS
CREAT SERPL-MCNC: 0.57 MG/DL (ref 0.51–0.95)
CREAT SERPL-MCNC: 0.61 MG/DL (ref 0.51–0.95)
CRP SERPL-MCNC: 76.6 MG/L
D DIMER PPP FEU-MCNC: 0.73 UG/ML FEU (ref 0–0.5)
DEPRECATED HCO3 PLAS-SCNC: 30 MMOL/L (ref 22–29)
ERYTHROCYTE [DISTWIDTH] IN BLOOD BY AUTOMATED COUNT: 13.6 % (ref 10–15)
GFR SERPL CREATININE-BSD FRML MDRD: >90 ML/MIN/1.73M2
GFR SERPL CREATININE-BSD FRML MDRD: >90 ML/MIN/1.73M2
GLUCOSE SERPL-MCNC: 166 MG/DL (ref 70–99)
GLUCOSE UR STRIP-MCNC: NEGATIVE MG/DL
HCT VFR BLD AUTO: 32.7 % (ref 35–47)
HGB BLD-MCNC: 10.7 G/DL (ref 11.7–15.7)
HGB UR QL STRIP: NEGATIVE
HOLD SPECIMEN: NORMAL
KETONES UR STRIP-MCNC: NEGATIVE MG/DL
LACTATE SERPL-SCNC: 1.6 MMOL/L (ref 0.7–2)
LEUKOCYTE ESTERASE UR QL STRIP: NEGATIVE
LVEF ECHO: NORMAL
MCH RBC QN AUTO: 30.7 PG (ref 26.5–33)
MCHC RBC AUTO-ENTMCNC: 32.7 G/DL (ref 31.5–36.5)
MCV RBC AUTO: 94 FL (ref 78–100)
NITRATE UR QL: NEGATIVE
PH UR STRIP: 6.5 [PH] (ref 5–7)
PLATELET # BLD AUTO: 473 10E3/UL (ref 150–450)
PLATELET # BLD AUTO: 490 10E3/UL (ref 150–450)
POTASSIUM SERPL-SCNC: 3.8 MMOL/L (ref 3.4–5.3)
PROT SERPL-MCNC: 6.5 G/DL (ref 6.4–8.3)
RBC # BLD AUTO: 3.48 10E6/UL (ref 3.8–5.2)
SODIUM SERPL-SCNC: 137 MMOL/L (ref 136–145)
SP GR UR STRIP: 1.01 (ref 1–1.03)
UROBILINOGEN UR STRIP-MCNC: <2 MG/DL
WBC # BLD AUTO: 15.6 10E3/UL (ref 4–11)

## 2022-10-06 PROCEDURE — 99233 SBSQ HOSP IP/OBS HIGH 50: CPT | Performed by: INTERNAL MEDICINE

## 2022-10-06 PROCEDURE — 999N000157 HC STATISTIC RCP TIME EA 10 MIN

## 2022-10-06 PROCEDURE — 82565 ASSAY OF CREATININE: CPT | Performed by: INTERNAL MEDICINE

## 2022-10-06 PROCEDURE — 250N000009 HC RX 250: Performed by: INTERNAL MEDICINE

## 2022-10-06 PROCEDURE — 255N000002 HC RX 255 OP 636: Performed by: INTERNAL MEDICINE

## 2022-10-06 PROCEDURE — 81003 URINALYSIS AUTO W/O SCOPE: CPT | Performed by: STUDENT IN AN ORGANIZED HEALTH CARE EDUCATION/TRAINING PROGRAM

## 2022-10-06 PROCEDURE — 85379 FIBRIN DEGRADATION QUANT: CPT | Performed by: INTERNAL MEDICINE

## 2022-10-06 PROCEDURE — 36415 COLL VENOUS BLD VENIPUNCTURE: CPT | Performed by: STUDENT IN AN ORGANIZED HEALTH CARE EDUCATION/TRAINING PROGRAM

## 2022-10-06 PROCEDURE — 94640 AIRWAY INHALATION TREATMENT: CPT

## 2022-10-06 PROCEDURE — 82040 ASSAY OF SERUM ALBUMIN: CPT | Performed by: STUDENT IN AN ORGANIZED HEALTH CARE EDUCATION/TRAINING PROGRAM

## 2022-10-06 PROCEDURE — 85049 AUTOMATED PLATELET COUNT: CPT | Performed by: INTERNAL MEDICINE

## 2022-10-06 PROCEDURE — 258N000003 HC RX IP 258 OP 636: Performed by: INTERNAL MEDICINE

## 2022-10-06 PROCEDURE — 80053 COMPREHEN METABOLIC PANEL: CPT | Performed by: STUDENT IN AN ORGANIZED HEALTH CARE EDUCATION/TRAINING PROGRAM

## 2022-10-06 PROCEDURE — 999N000208 ECHOCARDIOGRAM COMPLETE

## 2022-10-06 PROCEDURE — 120N000001 HC R&B MED SURG/OB

## 2022-10-06 PROCEDURE — 93306 TTE W/DOPPLER COMPLETE: CPT | Mod: 26 | Performed by: INTERNAL MEDICINE

## 2022-10-06 PROCEDURE — 250N000013 HC RX MED GY IP 250 OP 250 PS 637: Performed by: NURSE PRACTITIONER

## 2022-10-06 PROCEDURE — 85014 HEMATOCRIT: CPT | Performed by: STUDENT IN AN ORGANIZED HEALTH CARE EDUCATION/TRAINING PROGRAM

## 2022-10-06 PROCEDURE — 74018 RADEX ABDOMEN 1 VIEW: CPT

## 2022-10-06 PROCEDURE — 86140 C-REACTIVE PROTEIN: CPT | Performed by: STUDENT IN AN ORGANIZED HEALTH CARE EDUCATION/TRAINING PROGRAM

## 2022-10-06 PROCEDURE — 250N000013 HC RX MED GY IP 250 OP 250 PS 637: Performed by: INTERNAL MEDICINE

## 2022-10-06 PROCEDURE — 250N000011 HC RX IP 250 OP 636: Performed by: STUDENT IN AN ORGANIZED HEALTH CARE EDUCATION/TRAINING PROGRAM

## 2022-10-06 PROCEDURE — 250N000011 HC RX IP 250 OP 636: Performed by: INTERNAL MEDICINE

## 2022-10-06 PROCEDURE — 94640 AIRWAY INHALATION TREATMENT: CPT | Mod: 76

## 2022-10-06 PROCEDURE — 36415 COLL VENOUS BLD VENIPUNCTURE: CPT | Performed by: INTERNAL MEDICINE

## 2022-10-06 PROCEDURE — 250N000013 HC RX MED GY IP 250 OP 250 PS 637: Performed by: STUDENT IN AN ORGANIZED HEALTH CARE EDUCATION/TRAINING PROGRAM

## 2022-10-06 PROCEDURE — G1010 CDSM STANSON: HCPCS

## 2022-10-06 PROCEDURE — 83605 ASSAY OF LACTIC ACID: CPT | Performed by: INTERNAL MEDICINE

## 2022-10-06 RX ORDER — IOPAMIDOL 755 MG/ML
100 INJECTION, SOLUTION INTRAVASCULAR ONCE
Status: COMPLETED | OUTPATIENT
Start: 2022-10-06 | End: 2022-10-06

## 2022-10-06 RX ORDER — IPRATROPIUM BROMIDE AND ALBUTEROL SULFATE 2.5; .5 MG/3ML; MG/3ML
3 SOLUTION RESPIRATORY (INHALATION)
Status: DISCONTINUED | OUTPATIENT
Start: 2022-10-06 | End: 2022-10-09 | Stop reason: HOSPADM

## 2022-10-06 RX ORDER — POLYETHYLENE GLYCOL 3350 17 G/17G
17 POWDER, FOR SOLUTION ORAL DAILY
Status: DISCONTINUED | OUTPATIENT
Start: 2022-10-06 | End: 2022-10-09 | Stop reason: HOSPADM

## 2022-10-06 RX ORDER — AMOXICILLIN 250 MG
2 CAPSULE ORAL 2 TIMES DAILY PRN
Status: DISCONTINUED | OUTPATIENT
Start: 2022-10-06 | End: 2022-10-09 | Stop reason: HOSPADM

## 2022-10-06 RX ORDER — KETOROLAC TROMETHAMINE 15 MG/ML
15 INJECTION, SOLUTION INTRAMUSCULAR; INTRAVENOUS ONCE
Status: COMPLETED | OUTPATIENT
Start: 2022-10-06 | End: 2022-10-06

## 2022-10-06 RX ADMIN — IOPAMIDOL 100 ML: 755 INJECTION, SOLUTION INTRAVENOUS at 18:27

## 2022-10-06 RX ADMIN — FLUTICASONE PROPIONATE 2 SPRAY: 50 SPRAY, METERED NASAL at 08:59

## 2022-10-06 RX ADMIN — TRAZODONE HYDROCHLORIDE 100 MG: 100 TABLET ORAL at 21:19

## 2022-10-06 RX ADMIN — POLYETHYLENE GLYCOL 3350 17 G: 17 POWDER, FOR SOLUTION ORAL at 01:51

## 2022-10-06 RX ADMIN — PANTOPRAZOLE SODIUM 40 MG: 40 TABLET, DELAYED RELEASE ORAL at 05:59

## 2022-10-06 RX ADMIN — KETOROLAC TROMETHAMINE 15 MG: 15 INJECTION, SOLUTION INTRAMUSCULAR; INTRAVENOUS at 00:24

## 2022-10-06 RX ADMIN — METHYLPREDNISOLONE 40 MG: 40 INJECTION, POWDER, LYOPHILIZED, FOR SOLUTION INTRAMUSCULAR; INTRAVENOUS at 21:18

## 2022-10-06 RX ADMIN — ACETAMINOPHEN 650 MG: 325 TABLET, FILM COATED ORAL at 05:58

## 2022-10-06 RX ADMIN — AZITHROMYCIN 500 MG: 500 INJECTION, POWDER, LYOPHILIZED, FOR SOLUTION INTRAVENOUS at 14:36

## 2022-10-06 RX ADMIN — ROSUVASTATIN CALCIUM 5 MG: 5 TABLET, FILM COATED ORAL at 21:19

## 2022-10-06 RX ADMIN — IPRATROPIUM BROMIDE AND ALBUTEROL SULFATE 3 ML: .5; 3 SOLUTION RESPIRATORY (INHALATION) at 07:57

## 2022-10-06 RX ADMIN — IPRATROPIUM BROMIDE AND ALBUTEROL SULFATE 3 ML: .5; 3 SOLUTION RESPIRATORY (INHALATION) at 20:02

## 2022-10-06 RX ADMIN — UMECLIDINIUM 1 PUFF: 62.5 AEROSOL, POWDER ORAL at 08:59

## 2022-10-06 RX ADMIN — SENNOSIDES AND DOCUSATE SODIUM 2 TABLET: 50; 8.6 TABLET ORAL at 08:59

## 2022-10-06 RX ADMIN — METHYLPREDNISOLONE 40 MG: 40 INJECTION, POWDER, LYOPHILIZED, FOR SOLUTION INTRAMUSCULAR; INTRAVENOUS at 12:15

## 2022-10-06 RX ADMIN — GUAIFENESIN AND DEXTROMETHORPHAN 10 ML: 100; 10 SYRUP ORAL at 05:58

## 2022-10-06 RX ADMIN — METHYLPREDNISOLONE 40 MG: 40 INJECTION, POWDER, LYOPHILIZED, FOR SOLUTION INTRAMUSCULAR; INTRAVENOUS at 05:56

## 2022-10-06 RX ADMIN — ENOXAPARIN SODIUM 40 MG: 40 INJECTION SUBCUTANEOUS at 21:19

## 2022-10-06 RX ADMIN — CEFTRIAXONE SODIUM 1 G: 1 INJECTION, POWDER, FOR SOLUTION INTRAMUSCULAR; INTRAVENOUS at 13:40

## 2022-10-06 RX ADMIN — ASPIRIN 81 MG: 81 TABLET, COATED ORAL at 21:19

## 2022-10-06 RX ADMIN — PERFLUTREN 2 ML: 6.52 INJECTION, SUSPENSION INTRAVENOUS at 10:05

## 2022-10-06 RX ADMIN — POLYETHYLENE GLYCOL 3350 17 G: 17 POWDER, FOR SOLUTION ORAL at 08:59

## 2022-10-06 RX ADMIN — IPRATROPIUM BROMIDE AND ALBUTEROL SULFATE 3 ML: .5; 3 SOLUTION RESPIRATORY (INHALATION) at 17:00

## 2022-10-06 ASSESSMENT — ACTIVITIES OF DAILY LIVING (ADL)
ADLS_ACUITY_SCORE: 24

## 2022-10-06 NOTE — PROVIDER NOTIFICATION
House officer, Pennie LAMB, notified of pt's new complaint of 9/10 sharp, abdominal pain. Bowel sounds present. LBM 10/02/2022. Hard mass palpated to RLQ. House officer stated that he will come and assess pt. NOC nurse notified as well.

## 2022-10-06 NOTE — PLAN OF CARE
"  Problem: Plan of Care - These are the overarching goals to be used throughout the patient stay.    Goal: Plan of Care Review/Shift Note  Description: The Plan of Care Review/Shift note should be completed every shift.  The Outcome Evaluation is a brief statement about your assessment that the patient is improving, declining, or no change.  This information will be displayed automatically on your shift note.  Outcome: Ongoing, Progressing  Goal: Patient-Specific Goal (Individualized)  Description: You can add care plan individualizations to a care plan. Examples of Individualization might be:  \"Parent requests to be called daily at 9am for status\", \"I have a hard time hearing out of my right ear\", or \"Do not touch me to wake me up as it startles me\".  Outcome: Ongoing, Progressing  Goal: Absence of Hospital-Acquired Illness or Injury  Outcome: Ongoing, Progressing  Intervention: Identify and Manage Fall Risk  Recent Flowsheet Documentation  Taken 10/6/2022 0024 by Patty Henry, RN  Safety Promotion/Fall Prevention: lighting adjusted  Intervention: Prevent Skin Injury  Recent Flowsheet Documentation  Taken 10/6/2022 0024 by Patty Henry RN  Body Position: position changed independently  Intervention: Prevent and Manage VTE (Venous Thromboembolism) Risk  Recent Flowsheet Documentation  Taken 10/6/2022 0024 by Patty Henry, RN  Activity Management: ambulated to bathroom  Goal: Optimal Comfort and Wellbeing  Outcome: Ongoing, Progressing  Intervention: Monitor Pain and Promote Comfort  Recent Flowsheet Documentation  Taken 10/6/2022 0042 by Patty Henry, RN  Pain Management Interventions:   emotional support   heat applied  Taken 10/6/2022 0024 by Patty Henry, RN  Pain Management Interventions:   medication (see MAR)   emotional support   heat applied  Goal: Readiness for Transition of Care  Outcome: Ongoing, Progressing     Problem: Risk for Delirium  Goal: Optimal Coping  Outcome: Ongoing, " Progressing  Goal: Improved Behavioral Control  Outcome: Ongoing, Progressing  Goal: Improved Attention and Thought Clarity  Outcome: Ongoing, Progressing  Goal: Improved Sleep  Outcome: Ongoing, Progressing     Problem: Fluid Imbalance (Pneumonia)  Goal: Fluid Balance  Outcome: Ongoing, Progressing     Problem: Infection (Pneumonia)  Goal: Resolution of Infection Signs and Symptoms  Outcome: Ongoing, Progressing     Problem: Respiratory Compromise (Pneumonia)  Goal: Effective Oxygenation and Ventilation  Outcome: Ongoing, Progressing  Intervention: Optimize Oxygenation and Ventilation  Recent Flowsheet Documentation  Taken 10/6/2022 0024 by Patty Henry, RN  Head of Bed ([facility]) Positioning: HOB at 15 degrees     Problem: Gas Exchange Impaired  Goal: Optimal Gas Exchange  Outcome: Ongoing, Progressing  Intervention: Optimize Oxygenation and Ventilation  Recent Flowsheet Documentation  Taken 10/6/2022 0024 by Patty Henry, RN  Head of Bed ([facility]) Positioning: HOB at 15 degrees   Goal Outcome Evaluation:      Pt a/o with new c/o sharp right sided abdominal pain at beginning of shift. Resident assessed with new : labs drawn, x ray done, sent, toradol and miralax given. Heat pack applied. Will monitor.

## 2022-10-06 NOTE — PROGRESS NOTES
[facility]    Medicine Progress Note - Hospitalist Service    Date of Admission:  10/3/2022    Assessment & Plan     Naila Talley is a 72 year old female with a medical history of asthma presents to ER for cough and SOB for 10 days. Found to have community acquired pneumonia and hypoxia.      Community acquired pneumonia: Presents with fever, productive cough, SOB, hypoxia and leukocytosis. Chest XR reveals patchy opacities throughout the mid and lower lungs compatible with pneumonia. COVID19 negative.   - Started Ceftriaxone and Azithromycin in ER. Will continue   - Duoneb scheduled and prn   - Start mucomyst neb  - Antitussive  - Sputum gram stain and culture     Acute asthma exacerbation: History of asthma. Patient reports that she uses her rescue inhaler occasionally. She has never needed to be treated with steroid.   - Patient remains having congestion and wheezing today, but improved compared to yesterday. Continue solumedrol 40 mg IV q8h. Nebs as above.   - Pulmonary consulted and input appreciated     Acute hypoxemic respiratory failure: due to pneumonia and asthma exacerbation  - Needed supplemental nasal cannula oxygen 2 LPM on admission. It currently has increased to 4 LPM. Will continue and taper as tolerated.  - Given slow improvement of hypoxia, will check d dimer. May need CTA to rule out PE.        Diet: Regular Diet Adult    DVT Prophylaxis: Enoxaparin (Lovenox) SQ  Liu Catheter: Not present  Central Lines: None  Cardiac Monitoring: None  Code Status: Full Code      Disposition Plan      Expected Discharge Date: 10/07/2022    Discharge Delays: IV Medication - consider oral or Home Infusion  Oxygen Needs - Arrange Home O2    Discharge Comments: home o2 eval at discharge,IV Steroids        The patient's care was discussed with the Bedside Nurse, Care Coordinator/ and Patient.    Kam Gaytan MD  Hospitalist Service  [facility]  Hospital  Securely message with the Ring[employer] Web Console (learn more here)  Text page via [facility] Paging/Directory         ______________________________________________________________________    Interval History   Patient reports that her SOB and lung congestion seem to get better. On physical exam, she has less wheezing and less congestion. She continues to need 4 LPM supplemental oxygen at rest.     Data reviewed today: I reviewed all medications, new labs and imaging results over the last 24 hours.   Physical Exam   Vital Signs: Temp: 97.9  F (36.6  C) Temp src: Oral BP: (!) 167/73 Pulse: 72   Resp: 20 SpO2: 95 % O2 Device: Nasal cannula Oxygen Delivery: 4 LPM  Weight: 179 lbs 14.33 oz    General appearance: not in acute distress  HEENT: PERRL, EOMI  Lungs: Coarse breath sounds with wheezes in bilateral lung fields, improved compared to yesterday.  Cardiovascular: Regular rate and rhythm, normal S1-S2  Abdomen: Soft, non tender, no distension  Musculoskeletal: No joint swelling  Skin: No rash and no edema  Neurology: AAO ×3.  Cranial nerves II - XII normal.  Normal muscle strength in all four extremities.    Data   Recent Labs   Lab 10/06/22  0505 10/06/22  0021 10/04/22  1535 10/03/22  1254   WBC  --  15.6* 16.7* 18.8*   HGB  --  10.7* 11.4* 12.2   MCV  --  94 94 93   * 473* 415 440   NA  --  137  --  134*   POTASSIUM  --  3.8  --  3.7   CHLORIDE  --  99  --  90*   CO2  --  30*  --  28   BUN  --  12.2  --  11.1   CR 0.61 0.57  --  0.72   ANIONGAP  --  8  --  16*   ROZINA  --  8.9  --  9.2   GLC  --  166*  --  116*   ALBUMIN  --  3.0*  --   --    PROTTOTAL  --  6.5  --   --    BILITOTAL  --  0.2  --   --    ALKPHOS  --  122*  --   --    ALT  --  68*  --   --    AST  --  79*  --   --

## 2022-10-06 NOTE — PLAN OF CARE
Problem: Plan of Care - These are the overarching goals to be used throughout the patient stay.    Goal: Plan of Care Review/Shift Note  Description: The Plan of Care Review/Shift note should be completed every shift.  The Outcome Evaluation is a brief statement about your assessment that the patient is improving, declining, or no change.  This information will be displayed automatically on your shift note.  Outcome: Ongoing, Progressing   Goal Outcome Evaluation:        Pt had Bmtoday. Pt continues on 4L O2. Pt to have chest CT, new 18 Gauge IV placed. Pt has been up in chair

## 2022-10-06 NOTE — SIGNIFICANT EVENT
Significant Event Note    Time of event: 12:23 AM October 6, 2022    Description of event:  House officer notified by RN of patient experiencing new abdominal pain in right upper and lower quadrants.    Patient is a 72-year-old female with history of asthma here with community-acquired pneumonia.  Up until tonight she did not describe any abdominal discomfort.  She notes that her last bowel movement was about 3 days ago.  At that time it was diarrhea.  She notes that she has not been passing gas.  She feels slightly nauseous but has not vomited.  She denies any fevers.  She has had no blood in her stool.  She has had no history of kidney stones.  She describes pain as 9 out of 10.    GENERAL: healthy, alert and no distress  RESP: No increased work of breathing  CV: No cyanosis  ABD: Soft, significantly tender over right upper and lower quadrants.  Seems to extend to her back.  Minimal guarding, no rebound, abdomen does not appear to be distended, active bowel sounds.  MS: no gross musculoskeletal defects noted, no edema  SKIN: no suspicious lesions or rashes  PSYCH: mentation appears normal, affect normal/bright      Plan:  Abdominal pain  Presentation is concerning for SBO versus kidney stone versus constipation.  At this time will order some general labs as well as acquire abdominal x-ray.  Patient vitally stable and afebrile.  -UA  -CMP, CBC, CRP  - Abdominal x-ray  -Toradol 15 mg IV for pain x1  -Will follow up on results    Discussed with: bedside nurse    1:17am  XR showing Air-filled loops of small bowel are seen in the mid abdomen .There is gas seen in the colon and rectum.  Could be consistent with constipation. Will trial some miralax to see if BM will improve symptoms.    Juventino Casper, DO

## 2022-10-06 NOTE — PROGRESS NOTES
PULMONARY MEDICINE PROGRESS NOTE  10/6/2022    Admit Date: 10/3/2022  CODE: Full Code    Reason for Consult: acute respiratory failure with hypoxia, abnormal chest X-ray, asthma with acute exacerbation    Assessment/Plan:   72 year old female with a history of mild intermittent asthma, chronic cough, multiple family member with active viral respiratory infections, presented to ED on 10/3 with dyspnea, cough, and hypoxia, found to have bilateral pulmonary opacities most prominent at the left base, wheezing, and hypoxia, started on systemic steroid therapy, bronchodilators, and antibiiotics.    Acute respiratory failure with hypoxia, abnormal chest X-ray, asthma with acute exacerbation: Asthma mostly quiescent since early adulthood, though with multiple family members having active viral infection, likely viral (less likely bacterial) pneumonia leading to acute asthma exacerbation. However, still requiring 4 L/min oxygen, but this is likely due to shunt from pneumonia and, more prominently, V/Q mismatch from active asthma exacerbation with persistent bilateral wheezing. Nonetheless, with abnormal CXR and elevated D-dimer that was checked today (though likely elevated as acute phase reactant in response to viral infection and asthma exacerbation), will obtain chest CTA.    Plan:  - chest CT with PE protocol  - continue IV steroids; if clinically stable to improving tomorrow will change to prednisone taper  - continue empiric antibiotics  - with active exacerbation and ongoing wheezing, will change from umeclidinium to nebulized ipratropium-albuterol QID while inpatient  - has had hoarseness from inhaled corticosteroids in the past, which is a common side effect, but with her history she would likely benefit from ICS-LABA after prednisone taper until she can be reevaluated in pulmonary clinic; if she is resistant due to hoarseness can use LAMA or LAMA-LABA instead  - will contact pulmonary clinic to schedule outpatient  "follow-up  - will follow with you    Mauricio Moser MD  Pulmonary and Critical Care Medicine  [facility]  Vocera  Office [phone]  Pager [phone]  (he/him/his)                                                                                                                                                        SUBJECTIVE/INTERVAL HISTORY   Still wheezing and dry cough. On 4 L/min, SpO2 90-91%.                                                                                                                                                    Exam/Data:     Vitals  BP (!) 145/60 (BP Location: Right arm)   Pulse 80   Temp 97.1  F (36.2  C) (Oral)   Resp 20   Ht 1.651 m (5' 5\")   Wt 81.6 kg (179 lb 14.3 oz)   SpO2 90%   BMI 29.94 kg/m       I/O last 3 completed shifts:  In: 1200 [P.O.:1200]  Out: -   Weight change:   [unfilled]  EXAM:  BP (!) 145/60 (BP Location: Right arm)   Pulse 80   Temp 97.1  F (36.2  C) (Oral)   Resp 20   Ht 1.651 m (5' 5\")   Wt 81.6 kg (179 lb 14.3 oz)   SpO2 90%   BMI 29.94 kg/m      Intake/Output last 3 shifts:  I/O last 3 completed shifts:  In: 1200 [P.O.:1200]  Out: -   Intake/Output this shift:  No intake/output data recorded.    Physical Exam  Gen: alert, oriented, no distress  HEENT: NT, no DANIEL  CV: RRR, no m/g/r  Resp: diffuse bilateral expiratory wheezes, faint left basilar crackles  Abd: soft, nontender, BS+  Skin: no rashes or lesions  Ext: no edema  Neuro: PERRL, nonfocal exam    ROS: A complete 10-system review of systems was obtained and is negative with the exception of what is noted in the history of present illness.    Medications:       aspirin  81 mg Oral At Bedtime     azithromycin  500 mg Intravenous Q24H     cefTRIAXone  1 g Intravenous Q24H     enoxaparin ANTICOAGULANT  40 mg Subcutaneous Q24H     fluticasone  2 spray Both Nostrils Daily     methylPREDNISolone  40 mg Intravenous Q8H     pantoprazole  40 mg Oral QAM AC     polyethylene " glycol  17 g Oral Daily     rosuvastatin  5 mg Oral At Bedtime     sodium chloride (PF)  3 mL Intracatheter Q8H     traZODone  100 mg Oral At Bedtime     umeclidinium  1 puff Inhalation Daily         DATA  All laboratory and radiology has been personally reviewed by myself today.  Recent Labs   Lab 10/06/22  0505 10/06/22  0021   WBC  --  15.6*   HGB  --  10.7*   HCT  --  32.7*   * 473*     Recent Labs   Lab 10/06/22  0021 10/03/22  1254    134*   CO2 30* 28   BUN 12.2 11.1   ALKPHOS 122*  --    ALT 68*  --    AST 79*  --        PFT DATA:  None on record      IMAGING:   CXR (10/3/22):  - images directly reviewed, formal interpretation follows:  IMPRESSION: Linear and ill-defined patchy opacities throughout the mid and lower lungs compatible with pneumonia in the proper clinical setting. No pleural fusion. Shallow inspiration and mild elevation left hemidiaphragm exaggerate heart size and   pulmonary vascularity which are probably within normal limits.

## 2022-10-06 NOTE — PLAN OF CARE
Problem: Plan of Care - These are the overarching goals to be used throughout the patient stay.    Goal: Optimal Comfort and Wellbeing  Intervention: Monitor Pain and Promote Comfort  Recent Flowsheet Documentation  Taken 10/6/2022 0900 by Deanna Mayorga RN  Pain Management Interventions: (laxatives given) medication (see MAR)   Goal Outcome Evaluation:      Patient continues on 4 liters of oxygen, and saturation between 91-96%. Patient ambulated over 300 feet on 4 liters and stand by assist. Attempt made to titrate oxygen down to 3 liters but patient saturation dropped to high 80s when this attempted. Patient continues on IV antibiotics and IV steroids.

## 2022-10-06 NOTE — PLAN OF CARE
Problem: Plan of Care - These are the overarching goals to be used throughout the patient stay.    Goal: Absence of Hospital-Acquired Illness or Injury  Outcome: Ongoing, Progressing  Intervention: Identify and Manage Fall Risk  Recent Flowsheet Documentation  Taken 10/5/2022 1634 by Andre Macias RN  Safety Promotion/Fall Prevention:    clutter free environment maintained    lighting adjusted    nonskid shoes/slippers when out of bed    patient and family education    room door open    safety round/check completed     Problem: Plan of Care - These are the overarching goals to be used throughout the patient stay.    Goal: Optimal Comfort and Wellbeing  Outcome: Ongoing, Progressing     Problem: Risk for Delirium  Goal: Optimal Coping  Outcome: Ongoing, Progressing  Goal: Improved Behavioral Control  Outcome: Ongoing, Progressing  Goal: Improved Attention and Thought Clarity  Outcome: Ongoing, Progressing  Goal: Improved Sleep  Outcome: Ongoing, Progressing     Problem: Fluid Imbalance (Pneumonia)  Goal: Fluid Balance  Outcome: Ongoing, Progressing     Problem: Infection (Pneumonia)  Goal: Resolution of Infection Signs and Symptoms  Outcome: Ongoing, Progressing     Problem: Respiratory Compromise (Pneumonia)  Goal: Effective Oxygenation and Ventilation  Outcome: Ongoing, Progressing  Intervention: Promote Airway Secretion Clearance  Recent Flowsheet Documentation  Taken 10/5/2022 1634 by Andre Macias RN  Cough And Deep Breathing: done independently per patient     Problem: Gas Exchange Impaired  Goal: Optimal Gas Exchange  Outcome: Ongoing, Progressing     Pt alert and oriented x4. Denies pain at this time. Pt states feeling SOB w/ activity. Pt remains on 4 L of O2 via NC w/ O2 sat of  92% at rest. Pt performing deep breaths independently. Pt has nonproductive cough this shift. Pt receiving scheduled nebs. Pt independent in her room w/ transfers and cares.

## 2022-10-07 DIAGNOSIS — J18.9 PNEUMONIA: Primary | ICD-10-CM

## 2022-10-07 LAB
C PNEUM DNA SPEC QL NAA+PROBE: ABNORMAL
FLUAV H1 2009 PAND RNA SPEC QL NAA+PROBE: ABNORMAL
FLUAV H1 RNA SPEC QL NAA+PROBE: ABNORMAL
FLUAV H3 RNA SPEC QL NAA+PROBE: ABNORMAL
FLUAV RNA SPEC QL NAA+PROBE: ABNORMAL
FLUBV RNA SPEC QL NAA+PROBE: ABNORMAL
HADV DNA SPEC QL NAA+PROBE: ABNORMAL
HCOV PNL SPEC NAA+PROBE: ABNORMAL
HMPV RNA SPEC QL NAA+PROBE: ABNORMAL
HPIV1 RNA SPEC QL NAA+PROBE: ABNORMAL
HPIV2 RNA SPEC QL NAA+PROBE: ABNORMAL
HPIV3 RNA SPEC QL NAA+PROBE: ABNORMAL
HPIV4 RNA SPEC QL NAA+PROBE: ABNORMAL
LACTATE SERPL-SCNC: 3 MMOL/L (ref 0.7–2)
LACTATE SERPL-SCNC: 3.7 MMOL/L (ref 0.7–2)
M PNEUMO DNA SPEC QL NAA+PROBE: ABNORMAL
RSV RNA SPEC QL NAA+PROBE: ABNORMAL
RSV RNA SPEC QL NAA+PROBE: ABNORMAL
RV+EV RNA SPEC QL NAA+PROBE: ABNORMAL

## 2022-10-07 PROCEDURE — 250N000013 HC RX MED GY IP 250 OP 250 PS 637: Performed by: NURSE PRACTITIONER

## 2022-10-07 PROCEDURE — 99233 SBSQ HOSP IP/OBS HIGH 50: CPT | Performed by: INTERNAL MEDICINE

## 2022-10-07 PROCEDURE — 94640 AIRWAY INHALATION TREATMENT: CPT | Mod: 76

## 2022-10-07 PROCEDURE — 94640 AIRWAY INHALATION TREATMENT: CPT

## 2022-10-07 PROCEDURE — 250N000011 HC RX IP 250 OP 636: Performed by: INTERNAL MEDICINE

## 2022-10-07 PROCEDURE — 87633 RESP VIRUS 12-25 TARGETS: CPT | Performed by: INTERNAL MEDICINE

## 2022-10-07 PROCEDURE — 258N000003 HC RX IP 258 OP 636

## 2022-10-07 PROCEDURE — 250N000013 HC RX MED GY IP 250 OP 250 PS 637: Performed by: INTERNAL MEDICINE

## 2022-10-07 PROCEDURE — 258N000003 HC RX IP 258 OP 636: Performed by: INTERNAL MEDICINE

## 2022-10-07 PROCEDURE — 87205 SMEAR GRAM STAIN: CPT | Performed by: INTERNAL MEDICINE

## 2022-10-07 PROCEDURE — 87486 CHLMYD PNEUM DNA AMP PROBE: CPT | Performed by: INTERNAL MEDICINE

## 2022-10-07 PROCEDURE — 36415 COLL VENOUS BLD VENIPUNCTURE: CPT | Performed by: INTERNAL MEDICINE

## 2022-10-07 PROCEDURE — 87633 RESP VIRUS 12-25 TARGETS: CPT

## 2022-10-07 PROCEDURE — 999N000157 HC STATISTIC RCP TIME EA 10 MIN

## 2022-10-07 PROCEDURE — 120N000001 HC R&B MED SURG/OB

## 2022-10-07 PROCEDURE — 83605 ASSAY OF LACTIC ACID: CPT | Performed by: INTERNAL MEDICINE

## 2022-10-07 PROCEDURE — 250N000009 HC RX 250: Performed by: INTERNAL MEDICINE

## 2022-10-07 RX ORDER — PREDNISONE 20 MG/1
20 TABLET ORAL DAILY
Status: DISCONTINUED | OUTPATIENT
Start: 2022-10-15 | End: 2022-10-09 | Stop reason: HOSPADM

## 2022-10-07 RX ORDER — PREDNISONE 20 MG/1
20 TABLET ORAL DAILY
Status: DISCONTINUED | OUTPATIENT
Start: 2022-10-15 | End: 2022-10-07

## 2022-10-07 RX ORDER — PREDNISONE 5 MG/1
10 TABLET ORAL DAILY
Status: DISCONTINUED | OUTPATIENT
Start: 2022-10-18 | End: 2022-10-07

## 2022-10-07 RX ORDER — PREDNISONE 5 MG/1
10 TABLET ORAL DAILY
Status: DISCONTINUED | OUTPATIENT
Start: 2022-10-18 | End: 2022-10-09 | Stop reason: HOSPADM

## 2022-10-07 RX ORDER — SODIUM CHLORIDE 9 MG/ML
INJECTION, SOLUTION INTRAVENOUS CONTINUOUS
Status: DISCONTINUED | OUTPATIENT
Start: 2022-10-07 | End: 2022-10-08

## 2022-10-07 RX ORDER — PREDNISONE 20 MG/1
40 TABLET ORAL DAILY
Status: DISCONTINUED | OUTPATIENT
Start: 2022-10-09 | End: 2022-10-07

## 2022-10-07 RX ORDER — PREDNISONE 20 MG/1
40 TABLET ORAL DAILY
Status: DISCONTINUED | OUTPATIENT
Start: 2022-10-09 | End: 2022-10-09 | Stop reason: HOSPADM

## 2022-10-07 RX ORDER — DIPHENHYDRAMINE HCL 25 MG
25 CAPSULE ORAL EVERY 6 HOURS PRN
Status: DISCONTINUED | OUTPATIENT
Start: 2022-10-07 | End: 2022-10-07

## 2022-10-07 RX ORDER — METHYLPREDNISOLONE SODIUM SUCCINATE 40 MG/ML
40 INJECTION, POWDER, LYOPHILIZED, FOR SOLUTION INTRAMUSCULAR; INTRAVENOUS EVERY 12 HOURS
Status: COMPLETED | OUTPATIENT
Start: 2022-10-07 | End: 2022-10-08

## 2022-10-07 RX ADMIN — FLUTICASONE PROPIONATE 2 SPRAY: 50 SPRAY, METERED NASAL at 08:06

## 2022-10-07 RX ADMIN — METHYLPREDNISOLONE 40 MG: 40 INJECTION, POWDER, LYOPHILIZED, FOR SOLUTION INTRAMUSCULAR; INTRAVENOUS at 05:42

## 2022-10-07 RX ADMIN — IPRATROPIUM BROMIDE AND ALBUTEROL SULFATE 3 ML: .5; 3 SOLUTION RESPIRATORY (INHALATION) at 19:34

## 2022-10-07 RX ADMIN — ENOXAPARIN SODIUM 40 MG: 40 INJECTION SUBCUTANEOUS at 21:31

## 2022-10-07 RX ADMIN — SODIUM CHLORIDE 500 ML: 9 INJECTION, SOLUTION INTRAVENOUS at 23:03

## 2022-10-07 RX ADMIN — IPRATROPIUM BROMIDE AND ALBUTEROL SULFATE 3 ML: .5; 3 SOLUTION RESPIRATORY (INHALATION) at 11:20

## 2022-10-07 RX ADMIN — HYDRALAZINE HYDROCHLORIDE 10 MG: 20 INJECTION, SOLUTION INTRAMUSCULAR; INTRAVENOUS at 23:00

## 2022-10-07 RX ADMIN — PANTOPRAZOLE SODIUM 40 MG: 40 TABLET, DELAYED RELEASE ORAL at 08:06

## 2022-10-07 RX ADMIN — CEFTRIAXONE SODIUM 1 G: 1 INJECTION, POWDER, FOR SOLUTION INTRAMUSCULAR; INTRAVENOUS at 14:36

## 2022-10-07 RX ADMIN — IPRATROPIUM BROMIDE AND ALBUTEROL SULFATE 3 ML: .5; 3 SOLUTION RESPIRATORY (INHALATION) at 08:26

## 2022-10-07 RX ADMIN — ASPIRIN 81 MG: 81 TABLET, COATED ORAL at 21:31

## 2022-10-07 RX ADMIN — TRAZODONE HYDROCHLORIDE 100 MG: 100 TABLET ORAL at 21:31

## 2022-10-07 RX ADMIN — SODIUM CHLORIDE: 9 INJECTION, SOLUTION INTRAVENOUS at 17:26

## 2022-10-07 RX ADMIN — ROSUVASTATIN CALCIUM 5 MG: 5 TABLET, FILM COATED ORAL at 21:31

## 2022-10-07 RX ADMIN — METHYLPREDNISOLONE SODIUM SUCCINATE 40 MG: 40 INJECTION, POWDER, FOR SOLUTION INTRAMUSCULAR; INTRAVENOUS at 17:26

## 2022-10-07 RX ADMIN — AZITHROMYCIN 500 MG: 500 INJECTION, POWDER, LYOPHILIZED, FOR SOLUTION INTRAVENOUS at 15:43

## 2022-10-07 RX ADMIN — ACETAMINOPHEN 650 MG: 325 TABLET, FILM COATED ORAL at 17:25

## 2022-10-07 RX ADMIN — IPRATROPIUM BROMIDE AND ALBUTEROL SULFATE 3 ML: .5; 3 SOLUTION RESPIRATORY (INHALATION) at 16:30

## 2022-10-07 ASSESSMENT — ACTIVITIES OF DAILY LIVING (ADL)
ADLS_ACUITY_SCORE: 24

## 2022-10-07 NOTE — PROGRESS NOTES
[facility]:  PULMONARY PROGRESS NOTE     Date / Time of Admission:  10/3/2022 12:22 PM    ID: Naila Talley is a 72 year old female with history of mild intermittent asthma, chronic cough, multiple family member with active viral respiratory infections, presented to ED on 10/3 with dyspnea, cough, and hypoxia, found to have bilateral pulmonary opacities most prominent at the left base, wheezing, and hypoxia, started on systemic steroid therapy, bronchodilators, and antibiotics.    Reason for Consult:  acute respiratory failure with hypoxia, abnormal chest X-ray, asthma with acute exacerbation         Assessment:     Acute respiratory failure with hypoxia, abnormal chest X-ray, asthma with acute exacerbation: Asthma mostly quiescent since early adulthood, though with multiple family members having active viral infection, likely viral (less likely bacterial) pneumonia leading to acute asthma exacerbation. CTA chest completed 10/6 was negative for pulmonary embolism but did show bilateral multilobar bronchopneumonia, most pronounced in lung bases (left > right).  There was also mediastinal adenopathy noted.  Persistent oxygen requirements in the setting of this pneumonia, combined with active asthma exacerbation.        Plan:     Wean supplemental O2 as tolerates, goal O2 sat greater than 92%.    Reduce Solu-Medrol from 40 mg IV Q8H to Q12H and monitor.      Plan to transition to prednisone taper starting tomorrow.    Continue DuoNebs QID (switched from umedclidinium to nebs on 10/6), albuterol nebs PRN    Continue empiric antibiotics with ceftriaxone and azithromycin (start 10/4)    Has had hoarseness from inhaled corticosteroids in the past, which is a common side effect, but with her history she would likely benefit from ICS-LABA after prednisone taper until she can be reevaluated in pulmonary clinic; if she is resistant due to hoarseness can use LAMA or LAMA-LABA instead    PPI for GI  "prophylaxis, Lovenox subcutaneous for DVT prophylaxis    Outpatient follow-up:  Pulmonary Clinic evaluation TBD once stable for discharge.    Patient and/or family were educated on the above recommendations.   Thank you for allowing our service to participate in the care of this patient.  Please call with any questions or concerns.    Total patient care time: 35 minutes, with over 50% spent in counseling/coordination of care.      Sisi Kirkland MD, MPH  Pulmonary/Critical Care Medicine  10/07/2022  1:48 PM        Subjective/Interval History:   Feeling stable since yesterday, maybe slightly more alertness. Still with wheezing.  Intermittent cough primarily after neb therapies. No hemoptysis.   No fevers.  Felt winded with walking around the room.  Only has worked with PT once.     Review of Systems:  Pertinent items are noted in HPI.  The Review of Systems is negative other than noted in the HPI        Allergies/Medications:   Allergies:   No Known Allergies    Continuous Infusions:    Scheduled Medications:    aspirin  81 mg Oral At Bedtime     azithromycin  500 mg Intravenous Q24H     cefTRIAXone  1 g Intravenous Q24H     enoxaparin ANTICOAGULANT  40 mg Subcutaneous Q24H     fluticasone  2 spray Both Nostrils Daily     ipratropium - albuterol 0.5 mg/2.5 mg/3 mL  3 mL Nebulization 4x daily     methylPREDNISolone  40 mg Intravenous Q8H     pantoprazole  40 mg Oral QAM AC     polyethylene glycol  17 g Oral Daily     rosuvastatin  5 mg Oral At Bedtime     sodium chloride (PF)  3 mL Intracatheter Q8H     traZODone  100 mg Oral At Bedtime            Objective:   Vitals:  BP (!) 160/71 (BP Location: Left arm)   Pulse 104   Temp 97.7  F (36.5  C) (Oral)   Resp 16   Ht 1.651 m (5' 5\")   Wt 81.6 kg (179 lb 14.3 oz)   SpO2 94%   BMI 29.94 kg/m    GEN: Pleasant female, sitting in a chair in no acute distress.   HEENT: Normocephalic, atraumatic.  Extraoccular eye movements intact, anicteric sclera. No sinus tenderness " to palpation.  Moist mucous membranes.   NECK: Supple.    PULM: Non-labored breathing.  No use of accessory muscles.  Expiratory wheezing throughout, more prominent in upper lung fields.  CVS: Regular rate and rhythm.  Normal S1, S2.  No rubs, murmurs, or gallops.    ABDOMEN: Normoactive bowel sounds.  Non-tender to palpation.  Non-distended.    EXTREMITES:  No clubbing, cyanosis, or edema.    NEURO:  Awake.  Oriented to person, place, time and situation.  Cranial nerves 2-12 grossly intact.  Moving all extremities.      Intake/Output:  I/O last 3 completed shifts:  In: 1200 [P.O.:1200]  Out: -         Pertinent Studies:   All laboratory data reviewed  Serum Glucose range:   Recent Labs   Lab 10/06/22  0021 10/03/22  1254   * 116*     ABG: No lab results found in last 7 days.  CBC:   Recent Labs   Lab 10/06/22  0505 10/06/22  0021 10/04/22  1535 10/03/22  1254   WBC  --  15.6* 16.7* 18.8*   HGB  --  10.7* 11.4* 12.2   HCT  --  32.7* 34.4* 36.7   MCV  --  94 94 93   * 473* 415 440     Chemistry:   Recent Labs   Lab 10/06/22  0505 10/06/22  0021 10/03/22  1254   NA  --  137 134*   POTASSIUM  --  3.8 3.7   CHLORIDE  --  99 90*   CO2  --  30* 28   BUN  --  12.2 11.1   CR 0.61 0.57 0.72   GFRESTIMATED >90 >90 87   ROZINA  --  8.9 9.2   PROTTOTAL  --  6.5  --    ALBUMIN  --  3.0*  --    AST  --  79*  --    ALT  --  68*  --    ALKPHOS  --  122*  --    BILITOTAL  --  0.2  --      Coags:  No results for input(s): INR, PROTIME, PTT in the last 168 hours.    Invalid input(s): APTT  Cardiac Markers:  No results for input(s): CKTOTAL, TROPONINI in the last 168 hours.     Microbiology:  COVID19 PCR, 10/3: Negative  Influenza A/B, RSV swab, 10/3: Negative        Cardiology/Radiology:   Cardiac: All cardiac studies reviewed by me.    EKG:  Reviewed    TTE, 10/6/22:   Left ventricular size, wall motion and function are normal. The ejection fraction is 60-65%. Normal right ventricle size and systolic function. There is  mild to moderate (1-2+) aortic regurgitation. IVC diameter <2.1 cm collapsing >50% with sniff suggests a normal RA pressure of 3 mmHg.    Radiology: All imaging studies reviewed by me.    Chest X-Ray, 10/3/22:  Linear and ill-defined patchy opacities throughout the mid and lower lungs compatible with pneumonia in the proper clinical setting. No pleural fusion. Shallow inspiration and mild elevation left hemidiaphragm exaggerate heart size and   pulmonary vascularity which are probably within normal limits.    CT Chest PE protocol, 10/6/22:  FINDINGS: ANGIOGRAM CHEST: Limited due to respiratory motion. No central or definite peripheral pulmonary artery embolism. Thoracic aorta is negative for dissection. No CT evidence of right heart strain.  LUNGS AND PLEURA: Asymmetric elevation of the left hemidiaphragm. Mild tracheal secretions. Mosaic lung attenuation. Mild left lower lobar consolidation. Bilateral multilobar reticular nodular/tree-in-bud pulmonary opacities which are most pronounced within the lower lobes. Associated scattered small nodular pulmonary opacities including a 13 mm right lower lobe nodule image 163 series 4. Bilateral multilobar bronchial wall thickening and endobronchial mucoid impaction. No pleural effusion. MEDIASTINUM/AXILLAE: Mediastinal adenopathy. Normal heart size and no pericardial effusion. CORONARY ARTERY CALCIFICATION: None. UPPER ABDOMEN: Colonic diverticulosis. MUSCULOSKELETAL: Spinal degenerative changes. IMPRESSION: 1.  No pulmonary artery embolism. 2.  Findings consistent with bilateral multilobar bronchopneumonia most pronounced within the lung bases and perhaps reflecting aspiration. Recommend CT follow-up in approximately 4-8 weeks after treatment/symptom improvement to exclude underlying lung lesions. No pleural effusion.

## 2022-10-07 NOTE — PROGRESS NOTES
RESPIRATORY CARE NOTE    2 Duonebs completed during shift. Naila was on 5lpm and was titrated to 4lpm to maintain saturations above 92%. She has a congested cough, and it is productive to some degree. She has not been able to cough anything out to show sputum color. Post Duonebs she had increased aeration and stated she felt improved after the therapy. She completed her IS with the RT and was able to achieve 1500ml.     We reviewed the importance of the IS and the reasoning behind the therapy.     She does not use oxygen at home.    Vital signs:  Pulse: 104   Resp: 16 SpO2: 94 % O2 Device: Nasal cannula with humidification Oxygen Delivery: 4 LPM       Shabana Ratliff, RT  October 7, 2022

## 2022-10-07 NOTE — PROGRESS NOTES
RESPIRATORY CARE NOTE   Patient is on 4L NC, BS diminished, gave duoneb treatment x2, BS post treatment increased aeration, faint wheezing patient perceives moderate improvement, patient tolerated well. RT to continue to monitor.      Haritha Edwards, RT

## 2022-10-07 NOTE — PLAN OF CARE
Problem: Respiratory Compromise (Pneumonia)  Goal: Effective Oxygenation and Ventilation  Outcome: Ongoing, Progressing     Problem: Gas Exchange Impaired  Goal: Optimal Gas Exchange  Outcome: Ongoing, Progressing   Goal Outcome Evaluation:                  Pt alert and oriented x 4.pleasant and cooperative with cares. sats dropping down to high 80s on 4 L. increased oxygen to 5 L per NC. Sat improved to 90-93% . Will continue to monitor.

## 2022-10-07 NOTE — PROGRESS NOTES
Care Management Follow Up    Length of Stay (days): 4    Expected Discharge Date: 10/08/2022     Concerns to be Addressed:       Patient plan of care discussed at interdisciplinary rounds: Yes    Anticipated Discharge Disposition:       Anticipated Discharge Services:    Anticipated Discharge DME:      Patient/family educated on Medicare website which has current facility and service quality ratings:    Education Provided on the Discharge Plan:    Patient/Family in Agreement with the Plan:      Referrals Placed by CM/SW:    Private pay costs discussed: Not applicable    Additional Information:  Chart reviewed, no needs from CM at this time.  Patient continues to have unstable oxygen needs.     CM will continue to follow care progression and aide in discharge planning as needed.     Carito Morse RN

## 2022-10-07 NOTE — PLAN OF CARE
Problem: Respiratory Compromise (Pneumonia)  Goal: Effective Oxygenation and Ventilation  Outcome: Ongoing, Progressing     Problem: Gas Exchange Impaired  Goal: Optimal Gas Exchange  Outcome: Ongoing, Progressing   Goal Outcome Evaluation:                  Pt alert and oriented x 4.pleasant and cooperative with cares. sats dropping down to high 80s on 4 L. increased oxygen to 4.5 L per NC. Sats improved to 90-93%. Will continue to monitor. Pt slept most of the night.

## 2022-10-07 NOTE — PLAN OF CARE
Goal Outcome Evaluation:  Pt is A&Ox4, able to make needs known, ambulating indep to BR. C/o loose stools today. Pt received load of bowel meds yesterday, MD updated. Pt dnied pain. Up in chair all day. Eating/drinking well. LS clear, reports occasional productive cough. Specimen cup at bedside for sputum culture. Pt is using IS independently. Writer was able to decrease O2 to 3.5 L, pt satting at low-mid 90's. C/o SOB with activity and occasionally at rest. Continues on IV abx  Problem: Respiratory Compromise (Pneumonia)  Goal: Effective Oxygenation and Ventilation  Outcome: Ongoing, Progressing  Intervention: Promote Airway Secretion Clearance  Recent Flowsheet Documentation  Taken 10/7/2022 0755 by Mary Rowland RN  Cough And Deep Breathing: done independently per patient     Problem: Gas Exchange Impaired  Goal: Optimal Gas Exchange  Outcome: Ongoing, Progressing     Problem: Plan of Care - These are the overarching goals to be used throughout the patient stay.    Goal: Absence of Hospital-Acquired Illness or Injury  Intervention: Identify and Manage Fall Risk  Recent Flowsheet Documentation  Taken 10/7/2022 0755 by Mary Rowland RN  Safety Promotion/Fall Prevention:   patient and family education   nonskid shoes/slippers when out of bed  Intervention: Prevent Skin Injury  Recent Flowsheet Documentation  Taken 10/7/2022 0755 by Mary Rowland RN  Body Position: position changed independently  Intervention: Prevent and Manage VTE (Venous Thromboembolism) Risk  Recent Flowsheet Documentation  Taken 10/7/2022 0755 by Mary Rowland, RN  Activity Management: up in chair

## 2022-10-07 NOTE — PROGRESS NOTES
[facility]    Medicine Progress Note - Hospitalist Service    Date of Admission:  10/3/2022    Assessment & Plan     Naila Talley is a 72 year old female with a medical history of asthma presents to ER for cough and SOB for 10 days. Found to have community acquired pneumonia and hypoxia.      Community acquired pneumonia: Presents with fever, productive cough, SOB, hypoxia and leukocytosis. Chest XR reveals patchy opacities throughout the mid and lower lungs compatible with pneumonia. COVID19 negative. CT chest shows multifocal pneumonia at bilateral lung base.  - Started Ceftriaxone and Azithromycin in ER. Will continue   - Duoneb scheduled and prn   - Start mucomyst neb  - Antitussive  - Sputum gram stain and culture, respiratory panel     Acute asthma exacerbation: History of asthma. Patient reports that she uses her rescue inhaler occasionally. She has never needed to be treated with steroid.   - Patient remains having congestion and wheezing today, but improved compared to yesterday. Continue solumedrol 40 mg IV q8h. Nebs as above.   - Pulmonary consulted and input appreciated     Acute hypoxemic respiratory failure: due to pneumonia and asthma exacerbation  - Needed supplemental nasal cannula oxygen 2 LPM on admission. It currently has increased to 4 LPM. Will continue and taper as tolerated.  - Given slow improvement of hypoxia, d dimer was checked and it si slightly elevated.. CTA showed no PE.        Diet: Regular Diet Adult    DVT Prophylaxis: Enoxaparin (Lovenox) SQ  Liu Catheter: Not present  Central Lines: None  Cardiac Monitoring: None  Code Status: Full Code      Disposition Plan      Expected Discharge Date: 10/09/2022    Discharge Delays: IV Medication - consider oral or Home Infusion  Oxygen Needs - Arrange Home O2    Discharge Comments: home o2 eval at discharge,IV Steroids        The patient's care was discussed with the Bedside Nurse, Care Coordinator/  and Patient.    Kam Gaytan MD  Hospitalist Service  [facility]  Securely message with the [employer] Web Console (learn more here)  Text page via [employer] Paging/Directory         ______________________________________________________________________    Interval History   Patient reports having diarrhea after stool softener and miralax given yesterday for constipation. She feels less chest congestion and coughs up phlem easier. Her wheezing gets better too. However, she remains very hypoxic. She feels SOB when she goes to the bathroom.     Data reviewed today: I reviewed all medications, new labs and imaging results over the last 24 hours.   Physical Exam   Vital Signs: Temp: 97.5  F (36.4  C) Temp src: Oral BP: 136/63 Pulse: 95   Resp: 16 SpO2: 96 % O2 Device: Nasal cannula Oxygen Delivery: 3 LPM  Weight: 179 lbs 14.33 oz    General appearance: not in acute distress  HEENT: PERRL, EOMI  Lungs: Coarse breath sounds with wheezes in bilateral lung fields, improved compared to prior.  Cardiovascular: Regular rate and rhythm, normal S1-S2  Abdomen: Soft, non tender, no distension  Musculoskeletal: No joint swelling  Skin: No rash and no edema  Neurology: AAO ×3.  Cranial nerves II - XII normal.  Normal muscle strength in all four extremities.    Data   Recent Labs   Lab 10/06/22  0505 10/06/22  0021 10/04/22  1535 10/03/22  1254   WBC  --  15.6* 16.7* 18.8*   HGB  --  10.7* 11.4* 12.2   MCV  --  94 94 93   * 473* 415 440   NA  --  137  --  134*   POTASSIUM  --  3.8  --  3.7   CHLORIDE  --  99  --  90*   CO2  --  30*  --  28   BUN  --  12.2  --  11.1   CR 0.61 0.57  --  0.72   ANIONGAP  --  8  --  16*   ROZINA  --  8.9  --  9.2   GLC  --  166*  --  116*   ALBUMIN  --  3.0*  --   --    PROTTOTAL  --  6.5  --   --    BILITOTAL  --  0.2  --   --    ALKPHOS  --  122*  --   --    ALT  --  68*  --   --    AST  --  79*  --   --

## 2022-10-08 ENCOUNTER — APPOINTMENT (OUTPATIENT)
Dept: PHYSICAL THERAPY | Facility: HOSPITAL | Age: 72
DRG: 193 | End: 2022-10-08
Attending: INTERNAL MEDICINE
Payer: MEDICARE

## 2022-10-08 ENCOUNTER — APPOINTMENT (OUTPATIENT)
Dept: OCCUPATIONAL THERAPY | Facility: HOSPITAL | Age: 72
DRG: 193 | End: 2022-10-08
Attending: INTERNAL MEDICINE
Payer: MEDICARE

## 2022-10-08 LAB
BASOPHILS # BLD MANUAL: 0 10E3/UL (ref 0–0.2)
BASOPHILS NFR BLD MANUAL: 0 %
C PNEUM DNA SPEC QL NAA+PROBE: NOT DETECTED
EOSINOPHIL # BLD MANUAL: 0 10E3/UL (ref 0–0.7)
EOSINOPHIL NFR BLD MANUAL: 0 %
ERYTHROCYTE [DISTWIDTH] IN BLOOD BY AUTOMATED COUNT: 14 % (ref 10–15)
FLUAV H1 2009 PAND RNA SPEC QL NAA+PROBE: NOT DETECTED
FLUAV H1 RNA SPEC QL NAA+PROBE: NOT DETECTED
FLUAV H3 RNA SPEC QL NAA+PROBE: NOT DETECTED
FLUAV RNA SPEC QL NAA+PROBE: NOT DETECTED
FLUBV RNA SPEC QL NAA+PROBE: NOT DETECTED
HADV DNA SPEC QL NAA+PROBE: NOT DETECTED
HCOV PNL SPEC NAA+PROBE: NOT DETECTED
HCT VFR BLD AUTO: 30.8 % (ref 35–47)
HGB BLD-MCNC: 9.7 G/DL (ref 11.7–15.7)
HMPV RNA SPEC QL NAA+PROBE: NOT DETECTED
HOLD SPECIMEN: NORMAL
HPIV1 RNA SPEC QL NAA+PROBE: NOT DETECTED
HPIV2 RNA SPEC QL NAA+PROBE: NOT DETECTED
HPIV3 RNA SPEC QL NAA+PROBE: NOT DETECTED
HPIV4 RNA SPEC QL NAA+PROBE: NOT DETECTED
LACTATE SERPL-SCNC: 2.4 MMOL/L (ref 0.7–2)
LACTATE SERPL-SCNC: 4 MMOL/L (ref 0.7–2)
LYMPHOCYTES # BLD MANUAL: 1.7 10E3/UL (ref 0.8–5.3)
LYMPHOCYTES NFR BLD MANUAL: 11 %
M PNEUMO DNA SPEC QL NAA+PROBE: NOT DETECTED
MCH RBC QN AUTO: 30.3 PG (ref 26.5–33)
MCHC RBC AUTO-ENTMCNC: 31.5 G/DL (ref 31.5–36.5)
MCV RBC AUTO: 96 FL (ref 78–100)
MONOCYTES # BLD MANUAL: 0.3 10E3/UL (ref 0–1.3)
MONOCYTES NFR BLD MANUAL: 2 %
NEUTROPHILS # BLD MANUAL: 13.2 10E3/UL (ref 1.6–8.3)
NEUTROPHILS NFR BLD MANUAL: 87 %
PLAT MORPH BLD: ABNORMAL
PLATELET # BLD AUTO: 525 10E3/UL (ref 150–450)
RBC # BLD AUTO: 3.2 10E6/UL (ref 3.8–5.2)
RBC MORPH BLD: ABNORMAL
RSV RNA SPEC QL NAA+PROBE: NOT DETECTED
RSV RNA SPEC QL NAA+PROBE: NOT DETECTED
RV+EV RNA SPEC QL NAA+PROBE: DETECTED
WBC # BLD AUTO: 15.2 10E3/UL (ref 4–11)

## 2022-10-08 PROCEDURE — 97116 GAIT TRAINING THERAPY: CPT | Mod: GP

## 2022-10-08 PROCEDURE — 999N000157 HC STATISTIC RCP TIME EA 10 MIN

## 2022-10-08 PROCEDURE — 99232 SBSQ HOSP IP/OBS MODERATE 35: CPT | Performed by: INTERNAL MEDICINE

## 2022-10-08 PROCEDURE — 85014 HEMATOCRIT: CPT | Performed by: INTERNAL MEDICINE

## 2022-10-08 PROCEDURE — 97161 PT EVAL LOW COMPLEX 20 MIN: CPT | Mod: GP

## 2022-10-08 PROCEDURE — 94640 AIRWAY INHALATION TREATMENT: CPT

## 2022-10-08 PROCEDURE — 36415 COLL VENOUS BLD VENIPUNCTURE: CPT | Performed by: INTERNAL MEDICINE

## 2022-10-08 PROCEDURE — 36415 COLL VENOUS BLD VENIPUNCTURE: CPT

## 2022-10-08 PROCEDURE — 250N000009 HC RX 250: Performed by: INTERNAL MEDICINE

## 2022-10-08 PROCEDURE — 97165 OT EVAL LOW COMPLEX 30 MIN: CPT | Mod: GO

## 2022-10-08 PROCEDURE — 97530 THERAPEUTIC ACTIVITIES: CPT | Mod: GO

## 2022-10-08 PROCEDURE — 94640 AIRWAY INHALATION TREATMENT: CPT | Mod: 76

## 2022-10-08 PROCEDURE — 250N000011 HC RX IP 250 OP 636: Performed by: INTERNAL MEDICINE

## 2022-10-08 PROCEDURE — 99233 SBSQ HOSP IP/OBS HIGH 50: CPT | Performed by: INTERNAL MEDICINE

## 2022-10-08 PROCEDURE — 83605 ASSAY OF LACTIC ACID: CPT | Performed by: INTERNAL MEDICINE

## 2022-10-08 PROCEDURE — 250N000013 HC RX MED GY IP 250 OP 250 PS 637: Performed by: NURSE PRACTITIONER

## 2022-10-08 PROCEDURE — 85007 BL SMEAR W/DIFF WBC COUNT: CPT | Performed by: INTERNAL MEDICINE

## 2022-10-08 PROCEDURE — 120N000001 HC R&B MED SURG/OB

## 2022-10-08 PROCEDURE — 83605 ASSAY OF LACTIC ACID: CPT

## 2022-10-08 PROCEDURE — 250N000012 HC RX MED GY IP 250 OP 636 PS 637: Performed by: INTERNAL MEDICINE

## 2022-10-08 PROCEDURE — 250N000013 HC RX MED GY IP 250 OP 250 PS 637: Performed by: INTERNAL MEDICINE

## 2022-10-08 RX ORDER — CEFDINIR 300 MG/1
300 CAPSULE ORAL EVERY 12 HOURS SCHEDULED
Status: DISCONTINUED | OUTPATIENT
Start: 2022-10-08 | End: 2022-10-09 | Stop reason: HOSPADM

## 2022-10-08 RX ORDER — AMLODIPINE BESYLATE 5 MG/1
5 TABLET ORAL DAILY
Status: DISCONTINUED | OUTPATIENT
Start: 2022-10-08 | End: 2022-10-09 | Stop reason: HOSPADM

## 2022-10-08 RX ORDER — AZITHROMYCIN 250 MG/1
500 TABLET, FILM COATED ORAL ONCE
Status: COMPLETED | OUTPATIENT
Start: 2022-10-08 | End: 2022-10-08

## 2022-10-08 RX ADMIN — TRAZODONE HYDROCHLORIDE 100 MG: 100 TABLET ORAL at 22:41

## 2022-10-08 RX ADMIN — ENOXAPARIN SODIUM 40 MG: 40 INJECTION SUBCUTANEOUS at 20:42

## 2022-10-08 RX ADMIN — ASPIRIN 81 MG: 81 TABLET, COATED ORAL at 22:41

## 2022-10-08 RX ADMIN — ROSUVASTATIN CALCIUM 5 MG: 5 TABLET, FILM COATED ORAL at 22:41

## 2022-10-08 RX ADMIN — CEFDINIR 300 MG: 300 CAPSULE ORAL at 16:11

## 2022-10-08 RX ADMIN — FLUTICASONE PROPIONATE 2 SPRAY: 50 SPRAY, METERED NASAL at 08:20

## 2022-10-08 RX ADMIN — AZITHROMYCIN MONOHYDRATE 500 MG: 250 TABLET ORAL at 16:10

## 2022-10-08 RX ADMIN — IPRATROPIUM BROMIDE AND ALBUTEROL SULFATE 3 ML: .5; 3 SOLUTION RESPIRATORY (INHALATION) at 07:35

## 2022-10-08 RX ADMIN — IPRATROPIUM BROMIDE AND ALBUTEROL SULFATE 3 ML: .5; 3 SOLUTION RESPIRATORY (INHALATION) at 12:00

## 2022-10-08 RX ADMIN — METHYLPREDNISOLONE SODIUM SUCCINATE 40 MG: 40 INJECTION, POWDER, FOR SOLUTION INTRAMUSCULAR; INTRAVENOUS at 05:48

## 2022-10-08 RX ADMIN — PREDNISONE 50 MG: 20 TABLET ORAL at 11:56

## 2022-10-08 RX ADMIN — IPRATROPIUM BROMIDE AND ALBUTEROL SULFATE 3 ML: .5; 3 SOLUTION RESPIRATORY (INHALATION) at 16:48

## 2022-10-08 RX ADMIN — PANTOPRAZOLE SODIUM 40 MG: 40 TABLET, DELAYED RELEASE ORAL at 05:48

## 2022-10-08 RX ADMIN — IPRATROPIUM BROMIDE AND ALBUTEROL SULFATE 3 ML: .5; 3 SOLUTION RESPIRATORY (INHALATION) at 19:50

## 2022-10-08 RX ADMIN — AMLODIPINE BESYLATE 5 MG: 5 TABLET ORAL at 20:42

## 2022-10-08 ASSESSMENT — ACTIVITIES OF DAILY LIVING (ADL)
ADLS_ACUITY_SCORE: 24

## 2022-10-08 NOTE — PROGRESS NOTES
PULMONARY MEDICINE PROGRESS NOTE  10/6/2022    Admit Date: 10/3/2022  CODE: Full Code    Reason for Consult: acute respiratory failure with hypoxia, asthma with acute exacerbation, rhinovirus bronchitis/pneumoonia    Assessment/Plan:   72 year old female with a history of mild intermittent asthma, chronic cough, chronic left hemidiaphragm elevation, multiple family members with active viral respiratory infections, presented to ED on 10/3 with dyspnea, cough, and hypoxia, found to have bilateral pulmonary opacities most prominent at the left base, wheezing, and hypoxia, started on systemic steroid therapy, bronchodilators, and antibiiotics.    Acute respiratory failure with hypoxia, asthma with acute exacerbation, rhinovirus bronchitis/pneumoonia: Asthma mostly quiescent since early adulthood, though with multiple family members having active viral infection, nasopharyngeal respiratory viral PCR panel positive for rhinovirus, and chest CT with multifocal (mostly left basilar) pneumonitis (though also with chronic left hemidiaphragm elevation with associated atelectasis. No PE on chest CTA. Slow to recover, still on oxygen but down to 1 L/min. Wheezing persists but improved.    Plan:  - prednisone taper as ordered  - titrate oxygen as able; may need short-term supplemental oxygen on discharge; can perform home oxygen assessment at time of discharge  - today is day 6 of azithromycin; will stop after today  - continue ceftriaxone for 7 days or until discharge; today is day 6  - nebulized ipratropium-albuterol QID while inpatient (agree that her elevated serum lactate is likely due to nebulized albuterol)  - would discharge with as-needed albuterol HFA  - has had hoarseness from inhaled corticosteroids in the past, which is a common side effect, but with her history may bnefit from ICS if persistent asthma symptoms after recovers from rhinovirus infection; can discuss in pulmonary clinic  - scheduled for PFT on November  "14 and follow-up with Elizabeth Saeed NP in pulmonary clinic on November 16  - okay to discharge tomorrow at her current clinical trajectory  - will follow with you if she remains inpatient    Mauricio Moser MD  Pulmonary and Critical Care Medicine  [facility]  Vocera  Office [phone]  Pager [phone]  (he/him/his)                                                                                                                                                        SUBJECTIVE/INTERVAL HISTORY   Wheezing a bit better. Cough persists, starting to bring up sputum. No fever. Normal work of breathing.                                                                                                                                                    Exam/Data:     Vitals  BP (!) 169/77 (BP Location: Right arm)   Pulse 91   Temp 97.4  F (36.3  C) (Oral)   Resp 16   Ht 1.651 m (5' 5\")   Wt 81.6 kg (179 lb 14.3 oz)   SpO2 96%   BMI 29.94 kg/m       I/O last 3 completed shifts:  In: 1497 [P.O.:1120; I.V.:377]  Out: -   Weight change:   [unfilled]  EXAM:  BP (!) 169/77 (BP Location: Right arm)   Pulse 91   Temp 97.4  F (36.3  C) (Oral)   Resp 16   Ht 1.651 m (5' 5\")   Wt 81.6 kg (179 lb 14.3 oz)   SpO2 96%   BMI 29.94 kg/m      Intake/Output last 3 shifts:  I/O last 3 completed shifts:  In: 1497 [P.O.:1120; I.V.:377]  Out: -   Intake/Output this shift:  I/O this shift:  In: 360 [P.O.:360]  Out: -     Physical Exam  Gen: alert, oriented, no distress  HEENT: NT, no DANIEL  CV: tachycardic, regular, no m/g/r  Resp: diffuse bilateral expiratory wheezes, improving, faint left basilar crackles  Abd: soft, nontender, BS+  Skin: no rashes or lesions  Ext: no edema  Neuro: PERRL, nonfocal exam    ROS: A complete 10-system review of systems was obtained and is negative with the exception of what is noted in the history of present illness.    Medications:       aspirin  81 mg Oral At Bedtime     " azithromycin  500 mg Intravenous Q24H     cefTRIAXone  1 g Intravenous Q24H     enoxaparin ANTICOAGULANT  40 mg Subcutaneous Q24H     fluticasone  2 spray Both Nostrils Daily     ipratropium - albuterol 0.5 mg/2.5 mg/3 mL  3 mL Nebulization 4x daily     pantoprazole  40 mg Oral QAM AC     polyethylene glycol  17 g Oral Daily     [START ON 10/9/2022] predniSONE  40 mg Oral Daily    Followed by     [START ON 10/12/2022] predniSONE  30 mg Oral Daily    Followed by     [START ON 10/15/2022] predniSONE  20 mg Oral Daily    Followed by     [START ON 10/18/2022] predniSONE  10 mg Oral Daily     rosuvastatin  5 mg Oral At Bedtime     sodium chloride (PF)  3 mL Intracatheter Q8H     traZODone  100 mg Oral At Bedtime         DATA  All laboratory and radiology has been personally reviewed by myself today.  Recent Labs   Lab 10/08/22  0514   WBC 15.2*   HGB 9.7*   HCT 30.8*   *     Recent Labs   Lab 10/06/22  0021 10/03/22  1254    134*   CO2 30* 28   BUN 12.2 11.1   ALKPHOS 122*  --    ALT 68*  --    AST 79*  --        PFT DATA:  None on record    IMAGING:   Chest CTA (10/6/22):  - images directly reviewed, formal interpretation follows:  FINDINGS:  ANGIOGRAM CHEST: Limited due to respiratory motion. No central or definite peripheral pulmonary artery embolism. Thoracic aorta is negative for dissection. No CT evidence of right heart strain.     LUNGS AND PLEURA: Asymmetric elevation of the left hemidiaphragm. Mild tracheal secretions. Mosaic lung attenuation. Mild left lower lobar consolidation. Bilateral multilobar reticular nodular/tree-in-bud pulmonary opacities which are most pronounced   within the lower lobes. Associated scattered small nodular pulmonary opacities including a 13 mm right lower lobe nodule image 163 series 4. Bilateral multilobar bronchial wall thickening and endobronchial mucoid impaction. No pleural effusion.     MEDIASTINUM/AXILLAE: Mediastinal adenopathy. Normal heart size and no  pericardial effusion.     CORONARY ARTERY CALCIFICATION: None.     UPPER ABDOMEN: Colonic diverticulosis.     MUSCULOSKELETAL: Spinal degenerative changes.                                                                      IMPRESSION:  1.  No pulmonary artery embolism.  2.  Findings consistent with bilateral multilobar bronchopneumonia most pronounced within the lung bases and perhaps reflecting aspiration. Recommend CT follow-up in approximately 4-8 weeks after treatment/symptom improvement to exclude underlying lung   lesions. No pleural effusion.

## 2022-10-08 NOTE — PROGRESS NOTES
"   10/08/22 1345   Quick Adds   Type of Visit Initial PT Evaluation   Living Environment   People in Home spouse   Current Living Arrangements house  (Townhouse)   Home Accessibility stairs to enter home   Number of Stairs, Main Entrance 1   Transportation Anticipated family or friend will provide   Living Environment Comments Pt lives in a townhouse with her . All needs met on main level.   Self-Care   Usual Activity Tolerance good   Current Activity Tolerance good   Equipment Currently Used at Home none   Fall history within last six months no   Activity/Exercise/Self-Care Comment Patient is independent with all mobility at baseline.   General Information   Onset of Illness/Injury or Date of Surgery 10/03/22   Referring Physician Kam Gaytan MD   Patient/Family Therapy Goals Statement (PT) Improve endurance and walking   Pertinent History of Current Problem (include personal factors and/or comorbidities that impact the POC) Per chart, \"Naila Talley is a 72 year old female with a medical history of asthma presents to ER for cough and SOB for 10 days. Found to have community acquired pneumonia and hypoxia.\"   Existing Precautions/Restrictions oxygen therapy device and L/min  (1 L/min via NC)   Weight-Bearing Status - LLE full weight-bearing   Weight-Bearing Status - RLE full weight-bearing   Posture    Posture Forward head position   Range of Motion (ROM)   Range of Motion ROM is WNL   Strength (Manual Muscle Testing)   Strength (Manual Muscle Testing) strength is WNL   Bed Mobility   Comment, (Bed Mobility) Pt in chair.   Transfers   Transfers sit-stand transfer   Sit-Stand Transfer   Sit-Stand [place] (Transfers) independent   Gait/Stairs (Locomotion)   [place] Level (Gait) supervision;verbal cues   Distance in Feet (Required for LE Total Joints) 500'  (Eval first 10' then cueing following.)   Pattern (Gait) step-through   Deviations/Abnormal Patterns (Gait) base of support, wide;gait speed " decreased;stride length decreased   Balance   Balance other (describe)   Balance Comments Wide FADUMO during ambulation.   Clinical Impression   Criteria for Skilled Therapeutic Intervention Yes, treatment indicated   PT Diagnosis (PT) Impaired functional mobility   Influenced by the following impairments Deconditioning, decreased endurance, oxygen needs   Functional limitations due to impairments Gait, stairs   Clinical Presentation (PT Evaluation Complexity) Stable/Uncomplicated   Clinical Presentation Rationale Pt presents as medically diagnosed.   Clinical Decision Making (Complexity) low complexity   Planned Therapy Interventions (PT) balance training;gait training;home exercise program;patient/family education;stair training;progressive activity/exercise;home program guidelines   Risk & Benefits of therapy have been explained evaluation/treatment results reviewed;care plan/treatment goals reviewed;patient   PT Discharge Planning   PT Discharge Recommendation (DC Rec) (S)  home with assist   PT Rationale for DC Rec Pt ambulating ~500' on 1L O2 via NC with supervision. Mild balance deficits noted, likely due to deconditioning and inactivity. Will continue to progress balance and endurance with PT. Anticipate pt will be safe to discharge home with assist when medically cleared.   Plan of Care Review   Plan of Care Reviewed With patient   Total Evaluation Time   Total Evaluation Time (Minutes) 10   Physical Therapy Goals   PT Frequency Daily   PT Predicted Duration/Target Date for Goal Attainment 10/15/22   PT Goals Gait;Stairs   PT: Gait Independent;Greater than 200 feet  (Room air with SnO2 > 90%)   PT: Stairs Modified independent;1 stair;Rail on left

## 2022-10-08 NOTE — PLAN OF CARE
Goal Outcome Evaluation:      Pt has been up in chair all day. Eating/drinking well and ambulating indep to BR or commode. Pt c/o more frequent cough without phlegm, prn robitussin offered but pt declined at that time. SOB with activity improving, ambulated with PT around the unit. Rhinovirus came back positive this afternoon, pt placed on droplet precautions. LS clear, O2 sat mid 90's on 2L O2, continues on nebs, steroids and abx  Problem: Infection (Pneumonia)  Goal: Resolution of Infection Signs and Symptoms  Outcome: Ongoing, Progressing     Problem: Respiratory Compromise (Pneumonia)  Goal: Effective Oxygenation and Ventilation  Outcome: Ongoing, Progressing  Intervention: Promote Airway Secretion Clearance  Recent Flowsheet Documentation  Taken 10/8/2022 0813 by Mary Rowland RN  Cough And Deep Breathing: done independently per patient     Problem: Gas Exchange Impaired  Goal: Optimal Gas Exchange  Outcome: Ongoing, Progressing     Problem: Plan of Care - These are the overarching goals to be used throughout the patient stay.    Goal: Absence of Hospital-Acquired Illness or Injury  Intervention: Identify and Manage Fall Risk  Recent Flowsheet Documentation  Taken 10/8/2022 0813 by Mary Rowland RN  Safety Promotion/Fall Prevention:   patient and family education   nonskid shoes/slippers when out of bed  Intervention: Prevent Skin Injury  Recent Flowsheet Documentation  Taken 10/8/2022 0813 by Mary Rowland RN  Body Position: position changed independently  Intervention: Prevent and Manage VTE (Venous Thromboembolism) Risk  Recent Flowsheet Documentation  Taken 10/8/2022 0813 by Mary Rowland RN  Activity Management:   up ad dale   up in chair

## 2022-10-08 NOTE — PLAN OF CARE
"  Problem: Plan of Care - These are the overarching goals to be used throughout the patient stay.    Goal: Plan of Care Review/Shift Note  Outcome: Ongoing, Progressing  Flowsheets (Taken 10/7/2022 4509)  Plan of Care Reviewed With: patient  Goal: Optimal Comfort and Wellbeing  Outcome: Ongoing, Progressing  Intervention: Monitor Pain and Promote Comfort  Recent Flowsheet Documentation  Taken 10/7/2022 1725 by Shannan Randolph, RN  Pain Management Interventions:    medication (see MAR)    cold applied     Problem: Respiratory Compromise (Pneumonia)  Goal: Effective Oxygenation and Ventilation  Outcome: Ongoing, Progressing     Problem: Gas Exchange Impaired  Goal: Optimal Gas Exchange  Outcome: Ongoing, Progressing   Goal Outcome Evaluation:    Plan of Care Reviewed With: patient     At beginning of shift, denied pain, Rocephin had been running. Hung Zithromax, & when checked on patient again when Zithromax complete, patient reported burning at the IV site that started midway through the dose. No redness or swelling at the site. Notified Dr. Gaytan & gave Tylenol which patient said helped. Pulmonology involved & saw patient today. Also on steroids which were adjusted today. Sepsis protocol fired for tachycardia at 102, Lactic 3.0. Notified Dr. Gaytan who started patient on Normal Saline @ 100 mL/hr & put in for Lactic recheck at 2200. Recheck was 3.7. Notified House Officer Dr. Villaseñor who came to check on patient & ordered 500 mL bolus & Lactic recheck after. Per MD, elevated Lactic may be d/t patient being on Albuterol. 500 mL bolus programmed into pump. Respiratory panel PCR sent on shift, invalid per lab. Received another sample from patient to send to lab. Sputum sent during shift, \"the best I could do\" per patient, results pending.  "

## 2022-10-08 NOTE — SIGNIFICANT EVENT
Significant Event Note    Time of event: 10:52 PM October 7, 2022    Description of event:  RN called to inform of repeat lactate of 3.7 (up from 3.0 5 hours prior with maintenance fluids running). Patient admitted for CAP in the setting of chronic cough and mild intermittent asthma. CAP confirmed on imaging on admission and was started on ceftriaxone and azithromycin. Has also been using albuterol frequently for concurrent asthma exacerbation.     On exam, the patient is comfortable and in no acute distress. She has bilateral bibasilar crackles, but this appears to be her baseline. Moving air well. Vitals are all reassuring with tachycardia which can be explained by albuterol.     At this time, I do not believe an infection is the etiology of her elevated lactate. No indication for repeat Chest XR at this time given reassuring physical exam. Antibiotics already initiated, continue. Her frequent albuterol use is most likely contributing to the elevated level, but I will order a fluid bolus and repeat lactate per protocol.     Plan:  - albuterol most likely contributing, low suspicion for worsening infectious etiology   - continue antibiotics   - 500 fluid bolus with repeat lactate following   - if patient remains clinically stable, correlate with albuterol use and no further lactate trend indicated       Constance Villaseñor MD PGY-1   [facility]

## 2022-10-08 NOTE — PROGRESS NOTES
[facility]    Medicine Progress Note - Hospitalist Service    Date of Admission:  10/3/2022    Assessment & Plan     Naila Talley is a 72 year old female with a medical history of asthma presents to ER for cough and SOB for 10 days. Found to have community acquired pneumonia and hypoxia.      Community acquired pneumonia: Presents with fever, productive cough, SOB, hypoxia and leukocytosis. Chest XR reveals patchy opacities throughout the mid and lower lungs compatible with pneumonia. COVID19 negative. CT chest shows multifocal pneumonia at bilateral lung base.  - Completed Ceftriaxone and Azithromycin for 5 days. Will switch to oral Azithromycin and Cefdinir today since patient lost her IV access and prefers not to put another one.    - Duoneb scheduled and prn   - Antitussive  - Sputum gram stain and culture, respiratory panel sent, results pending     Acute asthma exacerbation: History of asthma. Patient reports that she uses her rescue inhaler occasionally. She has never needed to be treated with steroid.   - Patient's wheezing and congestion have now improved. Discontinue solumedrol and start prednisone taper.  - Nebs as above.   - Pulmonary consulted and input appreciated     Acute hypoxemic respiratory failure: due to pneumonia and asthma exacerbation. Given slow improvement of hypoxia, d dimer was checked and it si slightly elevated. CTA showed no PE. Normal proBNP and echocardiogram is unremarkable.   - Needed supplemental nasal cannula oxygen 2 LPM on admission. It then increased to 4 LPM. Hypoxia significantly improved today to 2 LPM. Will continue and taper as tolerated.  - May need home oxygen       Diet: Regular Diet Adult    DVT Prophylaxis: Enoxaparin (Lovenox) SQ  Liu Catheter: Not present  Central Lines: None  Cardiac Monitoring: None  Code Status: Full Code      Disposition Plan      Expected Discharge Date: 10/09/2022    Discharge Delays: IV Medication - consider  oral or Home Infusion  Oxygen Needs - Arrange Home O2    Discharge Comments: home o2 eval at discharge,IV Steroids        The patient's care was discussed with the Bedside Nurse, Care Coordinator/ and Patient.    Kam Gaytan MD  Hospitalist Service  [facility]  Securely message with the Vocera Web Console (learn more here)  Text page via Contour Paging/Directory         ______________________________________________________________________    Interval History   Patient reports having significant improvement today. Her SOB improved. Her oxygen was tapered down to 2 LPM. She has more productive cough and she feels that her chest is loosing up.     Data reviewed today: I reviewed all medications, new labs and imaging results over the last 24 hours.   Physical Exam   Vital Signs: Temp: 98  F (36.7  C) Temp src: Oral BP: (!) 159/72 Pulse: 100   Resp: 17 SpO2: 92 % O2 Device: Nasal cannula Oxygen Delivery: 1 LPM  Weight: 179 lbs 14.33 oz    General appearance: not in acute distress  HEENT: PERRL, EOMI  Lungs: Clear breath sounds in bilateral lung fields  Cardiovascular: Regular rate and rhythm, normal S1-S2  Abdomen: Soft, non tender, no distension  Musculoskeletal: No joint swelling  Skin: No rash and no edema  Neurology: AAO ×3.  Cranial nerves II - XII normal.  Normal muscle strength in all four extremities.    Data   Recent Labs   Lab 10/08/22  0514 10/06/22  0505 10/06/22  0021 10/04/22  1535 10/03/22  1254   WBC 15.2*  --  15.6* 16.7* 18.8*   HGB 9.7*  --  10.7* 11.4* 12.2   MCV 96  --  94 94 93   * 490* 473* 415 440   NA  --   --  137  --  134*   POTASSIUM  --   --  3.8  --  3.7   CHLORIDE  --   --  99  --  90*   CO2  --   --  30*  --  28   BUN  --   --  12.2  --  11.1   CR  --  0.61 0.57  --  0.72   ANIONGAP  --   --  8  --  16*   ROZINA  --   --  8.9  --  9.2   GLC  --   --  166*  --  116*   ALBUMIN  --   --  3.0*  --   --    PROTTOTAL  --   --  6.5  --   --    BILITOTAL   --   --  0.2  --   --    ALKPHOS  --   --  122*  --   --    ALT  --   --  68*  --   --    AST  --   --  79*  --   --

## 2022-10-08 NOTE — PLAN OF CARE
Occupational Therapy Discharge Summary    Reason for therapy discharge:    Discharged to home.    Progress towards therapy goal(s). See goals on Care Plan in [facility] electronic health record for goal details.  Goals met    Therapy recommendation(s):    No further therapy is recommended.

## 2022-10-08 NOTE — PROGRESS NOTES
RESPIRATORY CARE NOTE   Patient is on 3L NC, BS diminished, gave duoneb treatment x2, BS post treatment increased aeration with a new onset of exp/insp wheezing, patient perceives moderate improvement, patient tolerated well. RT to continue to follow.      Haritha Edwards, RT

## 2022-10-08 NOTE — PLAN OF CARE
Problem: Gas Exchange Impaired  Goal: Optimal Gas Exchange  Outcome: Ongoing, Progressing  Intervention: Optimize Oxygenation and Ventilation  Recent Flowsheet Documentation  Taken 10/8/2022 0200 by Lindsey Jesus, RN  Head of Bed (HOB) Positioning: HOB at 15 degrees   Goal Outcome Evaluation:  Pt cont to be on 3.5LPM via NC and sats are in low to mid 90's.  Pt denied pain throughout the night. Pt up to commode X3 to void. Pt is in chair at this time and is in a pleasant mood. All due cares done and explained to the patient, will cont to monitor and treat as needed.

## 2022-10-08 NOTE — PROGRESS NOTES
Pt denies shortness of breath.  C/o cough.  Respiratory effort normal. BS with fine inspiratory crackles in am, otherwise clear/diminshed.  Able to wean off O2.  Does not take medications for her asthma at home.  Continue scheduled duo nebs while hospitalized per pulmonary.    Constance Acuña, RT

## 2022-10-08 NOTE — PROGRESS NOTES
Occupational Therapy       10/08/22 1139   Quick Adds   Type of Visit Initial Occupational Therapy Evaluation   Living Environment   People in Home spouse   Current Living Arrangements other (see comments)  ([facility])   Home Accessibility stairs to enter home   Number of Stairs, Main Entrance 1  (pt can live on main level)   Living Environment Comments tub/shower on main level. Does not wear oxgyen at baseline.   Self-Care   Equipment Currently Used at Home none   Activity/Exercise/Self-Care Comment Patient independent with ADLs.   General Information   Onset of Illness/Injury or Date of Surgery 10/03/22   Referring Physician Kam Gaytan MD   Patient/Family Therapy Goal Statement (OT) breathe better   Additional Occupational Profile Info/Pertinent History of Current Problem Naila Talley is a 72 year old female with a medical history of asthma presents to ER for cough and SOB for 10 days. Found to have community acquired pneumonia and hypoxia.   Existing Precautions/Restrictions   (3L NC)   Cognitive Status Examination   Orientation Status orientation to person, place and time   Range of Motion Comprehensive   General Range of Motion no range of motion deficits identified   Strength Comprehensive (MMT)   General Manual Muscle Testing (MMT) Assessment no strength deficits identified   Transfers   Transfers sit-stand transfer   Sit-Stand Transfer   Sit-Stand [place] (Transfers) independent   Sit/Stand Transfer Comments patient independent with LE dressing   Balance   Balance Comments Patient ambulated in room wtih SBA, independnet with toileting tasks   Clinical Impression   Criteria for Skilled Therapeutic Interventions Met (OT) Yes, treatment indicated   OT Diagnosis Decreased endurance   Influenced by the following impairments pneumonia   OT Problem List-Impairments impacting ADL problems related to;activity tolerance impaired   Assessment of Occupational Performance 1-3 Performance Deficits    Identified Performance Deficits endurance for ADLs   Planned Therapy Interventions (OT) progressive activity/exercise   Clinical Decision Making Complexity (OT) low complexity   Risk & Benefits of therapy have been explained evaluation/treatment results reviewed;care plan/treatment goals reviewed   OT Discharge Planning   OT Discharge Recommendation (DC Rec) home   Total Evaluation Time (Minutes)   Total Evaluation Time (Minutes) 15   OT Goals   Therapy Frequency (OT) One time eval and treatment   OT Predicted Duration/Target Date for Goal Attainment 10/09/22   OT Goals Transfers   OT: Transfer Independent

## 2022-10-09 ENCOUNTER — APPOINTMENT (OUTPATIENT)
Dept: PHYSICAL THERAPY | Facility: HOSPITAL | Age: 72
DRG: 193 | End: 2022-10-09
Payer: MEDICARE

## 2022-10-09 VITALS
HEIGHT: 65 IN | RESPIRATION RATE: 18 BRPM | BODY MASS INDEX: 29.97 KG/M2 | WEIGHT: 179.9 LBS | SYSTOLIC BLOOD PRESSURE: 155 MMHG | HEART RATE: 114 BPM | OXYGEN SATURATION: 93 % | DIASTOLIC BLOOD PRESSURE: 74 MMHG | TEMPERATURE: 97.5 F

## 2022-10-09 PROCEDURE — 99239 HOSP IP/OBS DSCHRG MGMT >30: CPT | Performed by: INTERNAL MEDICINE

## 2022-10-09 PROCEDURE — 94640 AIRWAY INHALATION TREATMENT: CPT

## 2022-10-09 PROCEDURE — 97116 GAIT TRAINING THERAPY: CPT | Mod: GP

## 2022-10-09 PROCEDURE — 94640 AIRWAY INHALATION TREATMENT: CPT | Mod: 76

## 2022-10-09 PROCEDURE — 99232 SBSQ HOSP IP/OBS MODERATE 35: CPT | Performed by: INTERNAL MEDICINE

## 2022-10-09 PROCEDURE — 999N000157 HC STATISTIC RCP TIME EA 10 MIN

## 2022-10-09 PROCEDURE — 250N000013 HC RX MED GY IP 250 OP 250 PS 637: Performed by: INTERNAL MEDICINE

## 2022-10-09 PROCEDURE — 250N000012 HC RX MED GY IP 250 OP 636 PS 637: Performed by: INTERNAL MEDICINE

## 2022-10-09 PROCEDURE — 250N000013 HC RX MED GY IP 250 OP 250 PS 637: Performed by: NURSE PRACTITIONER

## 2022-10-09 PROCEDURE — 250N000009 HC RX 250: Performed by: INTERNAL MEDICINE

## 2022-10-09 RX ORDER — PREDNISONE 10 MG/1
TABLET ORAL
Qty: 26 TABLET | Refills: 0 | Status: SHIPPED | OUTPATIENT
Start: 2022-10-10 | End: 2022-10-09

## 2022-10-09 RX ORDER — PREDNISONE 10 MG/1
TABLET ORAL
Qty: 26 TABLET | Refills: 0 | Status: SHIPPED | OUTPATIENT
Start: 2022-10-10 | End: 2022-10-21

## 2022-10-09 RX ORDER — GUAIFENESIN/DEXTROMETHORPHAN 100-10MG/5
10 SYRUP ORAL EVERY 4 HOURS PRN
COMMUNITY
Start: 2022-10-09 | End: 2022-10-09

## 2022-10-09 RX ORDER — AMLODIPINE BESYLATE 5 MG/1
5 TABLET ORAL DAILY
Qty: 30 TABLET | Refills: 0 | Status: SHIPPED | OUTPATIENT
Start: 2022-10-10 | End: 2022-10-09

## 2022-10-09 RX ORDER — ALBUTEROL SULFATE 90 UG/1
2 AEROSOL, METERED RESPIRATORY (INHALATION) EVERY 6 HOURS PRN
Qty: 18 G | Refills: 0 | Status: SHIPPED | OUTPATIENT
Start: 2022-10-09

## 2022-10-09 RX ORDER — PANTOPRAZOLE SODIUM 40 MG/1
40 TABLET, DELAYED RELEASE ORAL
Qty: 14 TABLET | Refills: 0 | Status: SHIPPED | OUTPATIENT
Start: 2022-10-10 | End: 2022-12-09

## 2022-10-09 RX ORDER — AMLODIPINE BESYLATE 5 MG/1
5 TABLET ORAL DAILY
Qty: 30 TABLET | Refills: 0 | Status: SHIPPED | OUTPATIENT
Start: 2022-10-10 | End: 2022-12-09

## 2022-10-09 RX ORDER — IPRATROPIUM BROMIDE AND ALBUTEROL SULFATE 2.5; .5 MG/3ML; MG/3ML
3 SOLUTION RESPIRATORY (INHALATION) 4 TIMES DAILY PRN
Qty: 90 ML | Refills: 0 | Status: SHIPPED | OUTPATIENT
Start: 2022-10-09 | End: 2022-10-09

## 2022-10-09 RX ORDER — IPRATROPIUM BROMIDE AND ALBUTEROL SULFATE 2.5; .5 MG/3ML; MG/3ML
3 SOLUTION RESPIRATORY (INHALATION) 4 TIMES DAILY PRN
Qty: 90 ML | Refills: 0 | Status: SHIPPED | OUTPATIENT
Start: 2022-10-09 | End: 2022-12-09

## 2022-10-09 RX ORDER — GUAIFENESIN/DEXTROMETHORPHAN 100-10MG/5
10 SYRUP ORAL EVERY 4 HOURS PRN
COMMUNITY
Start: 2022-10-09 | End: 2023-05-16

## 2022-10-09 RX ORDER — ALBUTEROL SULFATE 90 UG/1
2 AEROSOL, METERED RESPIRATORY (INHALATION) EVERY 6 HOURS PRN
Qty: 18 G | Refills: 0 | Status: SHIPPED | OUTPATIENT
Start: 2022-10-09 | End: 2022-10-09

## 2022-10-09 RX ORDER — PANTOPRAZOLE SODIUM 40 MG/1
40 TABLET, DELAYED RELEASE ORAL
Qty: 14 TABLET | Refills: 0 | Status: SHIPPED | OUTPATIENT
Start: 2022-10-10 | End: 2022-10-09

## 2022-10-09 RX ADMIN — AMLODIPINE BESYLATE 5 MG: 5 TABLET ORAL at 10:23

## 2022-10-09 RX ADMIN — CEFDINIR 300 MG: 300 CAPSULE ORAL at 06:27

## 2022-10-09 RX ADMIN — PANTOPRAZOLE SODIUM 40 MG: 40 TABLET, DELAYED RELEASE ORAL at 06:27

## 2022-10-09 RX ADMIN — FLUTICASONE PROPIONATE 2 SPRAY: 50 SPRAY, METERED NASAL at 10:22

## 2022-10-09 RX ADMIN — IPRATROPIUM BROMIDE AND ALBUTEROL SULFATE 3 ML: .5; 3 SOLUTION RESPIRATORY (INHALATION) at 07:17

## 2022-10-09 RX ADMIN — IPRATROPIUM BROMIDE AND ALBUTEROL SULFATE 3 ML: .5; 3 SOLUTION RESPIRATORY (INHALATION) at 11:25

## 2022-10-09 RX ADMIN — PREDNISONE 40 MG: 20 TABLET ORAL at 10:23

## 2022-10-09 RX ADMIN — SENNOSIDES AND DOCUSATE SODIUM 1 TABLET: 50; 8.6 TABLET ORAL at 10:26

## 2022-10-09 ASSESSMENT — ACTIVITIES OF DAILY LIVING (ADL)
ADLS_ACUITY_SCORE: 24

## 2022-10-09 NOTE — PROGRESS NOTES
HOME O2 EVAL: pt was 94-95% on RA sitting in chair. Pt began to ambulate down the hallway about 75ft, O2 sats remained above 90% on RA. Pt sat in chair, sats increased to 93% on RA. No oxygen used during eval

## 2022-10-09 NOTE — PROGRESS NOTES
Care Management Discharge Note    Discharge Date: 10/09/2022       Discharge Disposition: Home    Discharge Services:      Discharge DME:      Discharge Transportation: family or friend will provide    Private pay costs discussed: Not applicable    PAS Confirmation Code:    Patient/family educated on Medicare website which has current facility and service quality ratings:      Education Provided on the Discharge Plan:    Persons Notified of Discharge Plans: Patient  Patient/Family in Agreement with the Plan: yes    Handoff Referral Completed: Yes    Additional Information:  Patient to discharge home, no CM needs identified. Family to transport.     Carito Morse RN

## 2022-10-09 NOTE — PROGRESS NOTES
RESPIRATORY CARE NOTE    Duoneb given X1 on schedule.  Breath sounds: pre tx, clear/diminished; post Tx, clear.  SpO2 92% on room air.      Vince Ratliff, RT

## 2022-10-09 NOTE — PROGRESS NOTES
Physical Therapy Discharge Summary    Reason for therapy discharge:    All goals and outcomes met, no further needs identified.    Progress towards therapy goal(s). See goals on Care Plan in [facility] electronic health record for goal details.  Goals met    Therapy recommendation(s):    Continue walking program.

## 2022-10-09 NOTE — PLAN OF CARE
"  Problem: Plan of Care - These are the overarching goals to be used throughout the patient stay.    Goal: Plan of Care Review/Shift Note  Description: The Plan of Care Review/Shift note should be completed every shift.  The Outcome Evaluation is a brief statement about your assessment that the patient is improving, declining, or no change.  This information will be displayed automatically on your shift note.  Outcome: Progressing  Goal: Patient-Specific Goal (Individualized)  Description: You can add care plan individualizations to a care plan. Examples of Individualization might be:  \"Parent requests to be called daily at 9am for status\", \"I have a hard time hearing out of my right ear\", or \"Do not touch me to wake me up as it startles me\".  Outcome: Progressing  Goal: Absence of Hospital-Acquired Illness or Injury  Outcome: Progressing  Intervention: Identify and Manage Fall Risk  Recent Flowsheet Documentation  Taken 10/9/2022 0000 by Lindsey Jesus RN  Safety Promotion/Fall Prevention:   assistive device/personal items within reach   nonskid shoes/slippers when out of bed   patient and family education   room door open   room organization consistent   safety round/check completed  Intervention: Prevent Skin Injury  Recent Flowsheet Documentation  Taken 10/9/2022 0000 by Lindsey Jesus, RN  Body Position: position changed independently  Goal: Optimal Comfort and Wellbeing  Outcome: Progressing  Goal: Readiness for Transition of Care  Outcome: Progressing     Problem: Risk for Delirium  Goal: Optimal Coping  Outcome: Progressing  Goal: Improved Behavioral Control  Outcome: Progressing  Goal: Improved Attention and Thought Clarity  Outcome: Progressing  Goal: Improved Sleep  Outcome: Progressing     Problem: Fluid Imbalance (Pneumonia)  Goal: Fluid Balance  Outcome: Progressing     Problem: Infection (Pneumonia)  Goal: Resolution of Infection Signs and Symptoms  Outcome: Progressing     Problem: Respiratory " Compromise (Pneumonia)  Goal: Effective Oxygenation and Ventilation  Outcome: Progressing  Intervention: Optimize Oxygenation and Ventilation  Recent Flowsheet Documentation  Taken 10/9/2022 0000 by Lindsey Jesus RN  Head of Bed (HOB) Positioning: HOB at 15 degrees     Problem: Gas Exchange Impaired  Goal: Optimal Gas Exchange  Outcome: Progressing  Intervention: Optimize Oxygenation and Ventilation  Recent Flowsheet Documentation  Taken 10/9/2022 0000 by Lindsey Jesus RN  Head of Bed (HOB) Positioning: HOB at 15 degrees   Goal Outcome Evaluation:  Pt slept on and off throughout the night, up to bathroom X3. Pt denied pain and vital signs stable. Pt cont to have a congested cough and wore NC overnight at 1LPM.

## 2022-10-09 NOTE — DISCHARGE SUMMARY
[facility]  Hospitalist Discharge Summary      Date of Admission:  10/3/2022  Date of Discharge:  10/9/2022  Discharging Provider: Kam Gaytan MD  Discharge Service: Hospitalist Service    Discharge Diagnoses     Principal Problem:    Acute respiratory failure with hypoxia (H)  Active Problems:    Community acquired pneumonia, unspecified laterality    Mild intermittent asthma with exacerbation    Essential hypertension    Hyperlipidemia LDL goal <100      Follow-ups Needed After Discharge   Follow-up Appointments     Follow-up and recommended labs and tests       * Follow up with primary care provider, Danelle Adam, within 7 days to   evaluate medication change and for hospital follow- up.  No follow up labs   or test are needed. Follow up on asthma exacerbation and hypertension.   Blood pressure has been high during her hospital stay. Amlodipine was   started. Need to recheck blood pressure and adjust medication as   indicated.     * Follow up in the pulmonary clinic to complete a pulmonary function test   on November 14, then follow-up with Elizabeth Saeed NP in pulmonary clinic   on November 16.           Discharge Disposition   Discharged to home  Condition at discharge: Stable      Hospital Course     Naila Talley is a 72 year old female with a medical history of asthma and hyperlipidemia presented to ER on 10/3/22 with cough and SOB for 10 days. She was found to have community acquired pneumonia, asthma exacerbation and hypoxia. On admission, patient had fever, productive cough, SOB, hypoxia and leukocytosis. Chest XR revealed patchy opacities throughout the mid and lower lungs compatible with pneumonia. COVID19 was tested negative. Patient was started Ceftriaxone and Azithromycin. Solumedrol and nebulizer treatment were also started. Patient needed supplemental nasal cannula oxygen 2 LPM on admission. It increased to 4 LPM the next day. Despite the above treatment, patient  continued to have cough, wheezing and persistent hypoxia. Pulmonary was consulted and recommended to continue the above treatment. Alternative etiology for her hypoxia were also investigated. ProBNP was checked and it was normal. Echocardiogram was unremarkable. D dimer was slightly elevated. Further CTA revealed no PE. Sputum respiratory panel revealed human rhinovirus. Sputum culture grew normal james. Patient completed antibiotics for six days. On hospital day 5, patient's wheezing, cough and hypoxia significantly improved. She was able to get tapered off supplemental oxygen. Home oxygen evaluation prior to discharge demonstrated good oxygen saturation and home oxygen is not required. During her hospital stay, patient was noticed to have elevated blood pressure up to the level of 170s/70s. Amlodipine was started. Patient was discharged home in a stable condition on 10/9/22. Patient is advised to follow up in the pulmonary clinic for pulmonary function test. She was also advised to follow up with PCP for her hypertension.        Consultations This Hospital Stay   PULMONARY IP CONSULT  PHYSICAL THERAPY ADULT IP CONSULT  OCCUPATIONAL THERAPY ADULT IP CONSULT    Code Status   Full Code    Time Spent on this Encounter   I, Kam Gaytan MD, personally saw the patient today and spent greater than 30 minutes discharging this patient.       Kam Gaytan MD  [address] [address]  Phone: [phone]  Fax: [phone]  ______________________________________________________________________    Physical Exam   Vital Signs: Temp: 97.5  F (36.4  C) Temp src: Oral BP: (!) 155/74 Pulse: 114   Resp: 18 SpO2: 93 % O2 Device: None (Room air) Oxygen Delivery: 1 LPM (trial RA)  Weight: 179 lbs 14.33 oz    General appearance: not in acute distress  HEENT: PERRL, EOMI  Lungs: Clear breath sounds in bilateral lung fields  Cardiovascular: Regular rate and rhythm, normal  S1-S2  Abdomen: Soft, non tender, no distension  Musculoskeletal: No joint swelling  Skin: No rash and no edema  Neurology: AAO ×3.  Cranial nerves II - XII normal.  Normal muscle strength in all four extremities.       Primary Care Physician   Danelle Adam    Discharge Orders      Nebulizer with Compressor     Nebulizer with Compressor     Reason for your hospital stay    * Pneumonia, asthma exacerbation and hypoxia.     Activity    Your activity upon discharge: activity as tolerated     Follow-up and recommended labs and tests     * Follow up with primary care provider, Danelle Adam, within 7 days to evaluate medication change and for hospital follow- up.  No follow up labs or test are needed. Follow up on asthma exacerbation and hypertension. Blood pressure has been high during her hospital stay. Amlodipine was started. Need to recheck blood pressure and adjust medication as indicated.     * Follow up in the pulmonary clinic to complete a pulmonary function test on November 14, then follow-up with Elizabeth Saeed NP in pulmonary clinic on November 16.     Diet    Follow this diet upon discharge: Regular Diet Adult       Significant Results and Procedures   Most Recent 3 CBC's:Recent Labs   Lab Test 10/08/22  0514 10/06/22  0505 10/06/22  0021 10/04/22  1535   WBC 15.2*  --  15.6* 16.7*   HGB 9.7*  --  10.7* 11.4*   MCV 96  --  94 94   * 490* 473* 415     Most Recent 3 BMP's:Recent Labs   Lab Test 10/06/22  0505 10/06/22  0021 10/03/22  1254 10/04/21  1423   NA  --  137 134* 140   POTASSIUM  --  3.8 3.7 3.5   CHLORIDE  --  99 90* 101   CO2  --  30* 28 25   BUN  --  12.2 11.1 13   CR 0.61 0.57 0.72 0.79   ANIONGAP  --  8 16* 14   ROZINA  --  8.9 9.2 9.8   GLC  --  166* 116* 111       EXAM: XR CHEST 2 VIEWS  LOCATION: [facility]  DATE/TIME: 10/3/2022 12:48 PM     INDICATION: Dyspnea.  COMPARISON: None.                                                                      IMPRESSION:  Linear and ill-defined patchy opacities throughout the mid and lower lungs compatible with pneumonia in the proper clinical setting. No pleural fusion. Shallow inspiration and mild elevation left hemidiaphragm exaggerate heart size and   pulmonary vascularity which are probably within normal limits.      EXAM: CT CHEST PULMONARY EMBOLISM W CONTRAST  LOCATION: [facility]  DATE/TIME: 10/6/2022 6:26 PM     INDICATION: acute hypoxic respiratory failure, elevated D dimer  COMPARISON: None.  TECHNIQUE: CT chest pulmonary angiogram during arterial phase injection of IV contrast. Multiplanar reformats and MIP reconstructions were performed. Dose reduction techniques were used.   CONTRAST: isovue 370  100ml     FINDINGS:  ANGIOGRAM CHEST: Limited due to respiratory motion. No central or definite peripheral pulmonary artery embolism. Thoracic aorta is negative for dissection. No CT evidence of right heart strain.     LUNGS AND PLEURA: Asymmetric elevation of the left hemidiaphragm. Mild tracheal secretions. Mosaic lung attenuation. Mild left lower lobar consolidation. Bilateral multilobar reticular nodular/tree-in-bud pulmonary opacities which are most pronounced   within the lower lobes. Associated scattered small nodular pulmonary opacities including a 13 mm right lower lobe nodule image 163 series 4. Bilateral multilobar bronchial wall thickening and endobronchial mucoid impaction. No pleural effusion.     MEDIASTINUM/AXILLAE: Mediastinal adenopathy. Normal heart size and no pericardial effusion.     CORONARY ARTERY CALCIFICATION: None.     UPPER ABDOMEN: Colonic diverticulosis.     MUSCULOSKELETAL: Spinal degenerative changes.                                                                      IMPRESSION:  1.  No pulmonary artery embolism.  2.  Findings consistent with bilateral multilobar bronchopneumonia most pronounced within the lung bases and perhaps reflecting aspiration. Recommend CT  follow-up in approximately 4-8 weeks after treatment/symptom improvement to exclude underlying lung   lesions. No pleural effusion.      Echocardiogram:    Interpretation Summary     Left ventricular size, wall motion and function are normal. The ejection  fraction is 60-65%.  Normal right ventricle size and systolic function.  There is mild to moderate (1-2+) aortic regurgitation.  IVC diameter <2.1 cm collapsing >50% with sniff suggests a normal RA pressure of 3 mmHg.          Discharge Medications   Discharge Medication List as of 10/9/2022  1:15 PM      CONTINUE these medications which have CHANGED    Details   albuterol (PROAIR HFA/PROVENTIL HFA/VENTOLIN HFA) 108 (90 Base) MCG/ACT inhaler Inhale 2 puffs into the lungs every 6 hours as needed for shortness of breath / dyspnea or wheezing, Disp-18 g, R-0, E-PrescribePharmacy may dispense brand covered by insurance (Proair, or proventil or ventolin or generic albuterol inhaler)      amLODIPine (NORVASC) 5 MG tablet Take 1 tablet (5 mg) by mouth daily, Disp-30 tablet, R-0, E-Prescribe      guaiFENesin-dextromethorphan (ROBITUSSIN DM) 100-10 MG/5ML syrup Take 10 mLs by mouth every 4 hours as needed for cough, OTC      ipratropium - albuterol 0.5 mg/2.5 mg/3 mL (DUONEB) 0.5-2.5 (3) MG/3ML neb solution Take 1 vial (3 mLs) by nebulization 4 times daily as needed for wheezing or shortness of breath / dyspnea, Disp-90 mL, R-0, E-Prescribe      pantoprazole (PROTONIX) 40 MG EC tablet Take 1 tablet (40 mg) by mouth every morning (before breakfast), Disp-14 tablet, R-0, E-Prescribe      predniSONE (DELTASONE) 10 MG tablet Take 4 tablets (40 mg) by mouth daily for 2 days, THEN 3 tablets (30 mg) daily for 3 days, THEN 2 tablets (20 mg) daily for 3 days, THEN 1 tablet (10 mg) daily for 3 days., Disp-26 tablet, R-0, E-Prescribe         CONTINUE these medications which have NOT CHANGED    Details   aspirin 81 MG EC tablet Take 81 mg by mouth At Bedtime, Historical      calcium  citrate (CITRACAL) 950 (200 Ca) MG tablet Take 1 tablet by mouth daily (with lunch), Historical      cholecalciferol (VITAMIN D3) 25 mcg (1000 units) capsule Take 1 capsule by mouth daily (with dinner), Historical      Multiple Vitamins-Minerals (OCUVITE PRESERVISION PO) Take 1 tablet by mouth daily (with dinner), Historical      multivitamin, therapeutic (THERA-VIT) TABS tablet Take 1 tablet by mouth daily, Historical      nystatin (MYCOSTATIN) 510741 UNIT/GM external powder Apply 1 Application topically 2 times daily as neededHistorical      Omega-3-6-9 CAPS Take 1 capsule by mouth daily, Historical      rosuvastatin (CRESTOR) 5 MG tablet Take 5 mg by mouth At Bedtime, Historical      traZODone (DESYREL) 100 MG tablet Take 100 mg by mouth At Bedtime, Historical           Allergies   No Known Allergies

## 2022-10-09 NOTE — DISCHARGE INSTRUCTIONS
PULMONARY MEDICINE  11/14/2022 AT 2:00 PM:  LUNG FUNCTION TEST APPOINTMENT AT THE [facility] RESPIRATORY DEPARTMENT  11/16/2022 AT 10:15 AM:  PULMONARY CLINIC APPOINTMENT AT THE [place] [facility]    [facility] phone:  [phone].     =========================================    Instruction for medication:  Take the albuterol inhaler when you feel shortness of breath. If your breathing does not improve, then try the nebulizer.

## 2022-10-09 NOTE — PROGRESS NOTES
No shortness of breath.  Respiratory effort normal.  Off O2.  BS clear and diminished in the bases.  Plan is to continue scheduled nebs.  Pt expects to discharge home later today.    Constance Acuña, RT

## 2022-10-09 NOTE — PROGRESS NOTES
[facility]:  PULMONARY PROGRESS NOTE     Date / Time of Admission:  10/3/2022 12:22 PM    ID: Naila Talley is a 72 year old female with history of mild intermittent asthma, chronic cough, multiple family member with active viral respiratory infections, presented to ED on 10/3 with dyspnea, cough, and hypoxia, found to have bilateral pulmonary opacities most prominent at the left base, wheezing, and hypoxia, started on systemic steroid therapy, bronchodilators, and antibiotics. Found to be + for rhinovirus infection.    Reason for Consult:  acute respiratory failure with hypoxia, abnormal chest X-ray, asthma with acute exacerbation         Assessment:     Acute respiratory failure with hypoxia, abnormal chest X-ray, asthma with acute exacerbation in setting of rhinovirus viral illness: Asthma mostly quiescent since early adulthood, though with multiple family members having active viral infection, likely viral (less likely bacterial) pneumonia leading to acute asthma exacerbation. Respiratory panel PCR 10/7 positive for rhinovirus. CTA chest completed 10/6 was negative for pulmonary embolism but did show bilateral multilobar bronchopneumonia, most pronounced in lung bases (left > right).  There was also mediastinal adenopathy noted.  Was treated with azithromycin x 6 days; ceftriaxone x 5 days followed by cefdinir x 2 days. Persistent oxygen requirements in the setting of this pneumonia, combined with active asthma exacerbation.  Currently, patient has transitioned to room air.      Of note, her asthma symptoms are primarily a chronic cough at baseline. She has tried multiple inhalers in the past but with no clinical improvement. We discussed possible re-trial of inhalers in the future.  She is open to discuss this during her pulmonary clinic follow-up in November.      Lactic acidosis.  Suspected albuterol induced with frequent treatments. Patient is non-toxic appearing.        Plan:     Wean  "supplemental O2 as tolerates, goal O2 sat greater than 92%.    O2 walk study to determine if patient requires O2 for discharge.     Continue prednisone taper as outlined at discharge.      Continue DuoNebs QID while inhouse (switched from umedclidinium to nebs on 10/6), albuterol nebs PRN    Okay to transition to albuterol HFA PRN for discharge.    Antibiotics completed as of today, not required at discharge.    With her history she would likely benefit from ICS-LABA .  We can review this with her during her Pulmonary Clinic visit next month. If she is resistant to IHS due to hoarseness (has been a prior symptoms), can trial LAMA or LAMA-LABA instead    PPI for GI prophylaxis, Lovenox subcutaneous for DVT prophylaxis    We discussed timing of vaccination, will wait until she is out of her acute illness for vaccination. Planning to take in about 2 weeks.    Outpatient follow-up:      11/14/22 @ 2 pm: Pulmonary Function tests scheduled at [facility]. Patient will contact the clinic if she needs to readjust the location for this scheduled appointment. Phone number provided.    11/16/22 @ 10:15 am: Pulmonary Clinic appointment with Elizabeth Saeed CNP.     Patient and/or family were educated on the above recommendations.   Thank you for allowing our service to participate in the care of this patient.  Please call with any questions or concerns.    Total patient care time: 25 minutes, with over 50% spent in counseling/coordination of care.      Sisi Kirkland MD, MPH  Pulmonary/Critical Care Medicine  10/09/2022  11:50 AM        Subjective/Interval History:   Feeling improved, planning to go home today. Still with some cough, wheezing but improved.   Currently off oxygen.  Going to be evaluated for home O2 needs.     States that she has chronic cough in the setting of her asthma.  Has tried many inhalers in the past, didn't feel like they \"did anything\" but is open to reconsidering.     Review of Systems:  Pertinent items " "are noted in HPI.  The Review of Systems is negative other than noted in the HPI        Allergies/Medications:   Allergies:   No Known Allergies    Continuous Infusions:    Scheduled Medications:    amLODIPine  5 mg Oral Daily     aspirin  81 mg Oral At Bedtime     cefdinir  300 mg Oral Q12H [facility] (08/20)     enoxaparin ANTICOAGULANT  40 mg Subcutaneous Q24H     fluticasone  2 spray Both Nostrils Daily     ipratropium - albuterol 0.5 mg/2.5 mg/3 mL  3 mL Nebulization 4x daily     pantoprazole  40 mg Oral QAM AC     polyethylene glycol  17 g Oral Daily     predniSONE  40 mg Oral Daily    Followed by     [START ON 10/12/2022] predniSONE  30 mg Oral Daily    Followed by     [START ON 10/15/2022] predniSONE  20 mg Oral Daily    Followed by     [START ON 10/18/2022] predniSONE  10 mg Oral Daily     rosuvastatin  5 mg Oral At Bedtime     sodium chloride (PF)  3 mL Intracatheter Q8H     traZODone  100 mg Oral At Bedtime            Objective:   Vitals:  BP (!) 155/74 (BP Location: Right arm)   Pulse 114   Temp 97.5  F (36.4  C) (Oral)   Resp 18   Ht 1.651 m (5' 5\")   Wt 81.6 kg (179 lb 14.3 oz)   SpO2 93%   BMI 29.94 kg/m    GEN: Pleasant female, sitting in a chair in no acute distress.   HEENT: Normocephalic, atraumatic.  Extraoccular eye movements intact, anicteric sclera. Moist mucous membranes.   NECK: Supple.    PULM: Non-labored breathing.  No use of accessory muscles.  Expiratory wheezing in right lung posteriorly, otherwise clear.  CVS: Regular rate and rhythm.  Normal S1, S2.  No rubs, murmurs, or gallops.    ABDOMEN: Soft. Non-tender to palpation.  Non-distended.    EXTREMITES:  No clubbing, cyanosis, or edema.    NEURO:  Awake.  Oriented to person, place, time and situation.  Cranial nerves 2-12 grossly intact.  Moving all extremities.      Intake/Output:  I/O last 3 completed shifts:  In: 760 [P.O.:760]  Out: -         Pertinent Studies:   All laboratory data reviewed  Serum Glucose range:   Recent Labs "   Lab 10/06/22  0021 10/03/22  1254   * 116*     ABG: No lab results found in last 7 days.  CBC:   Recent Labs   Lab 10/08/22  0514 10/06/22  0505 10/06/22  0021 10/04/22  1535   WBC 15.2*  --  15.6* 16.7*   HGB 9.7*  --  10.7* 11.4*   HCT 30.8*  --  32.7* 34.4*   MCV 96  --  94 94   * 490* 473* 415   NEUTROPHIL 87  --   --   --    LYMPH 11  --   --   --    MONOCYTE 2  --   --   --    EOSINOPHIL 0  --   --   --      Chemistry:   Recent Labs   Lab 10/06/22  0505 10/06/22  0021 10/03/22  1254   NA  --  137 134*   POTASSIUM  --  3.8 3.7   CHLORIDE  --  99 90*   CO2  --  30* 28   BUN  --  12.2 11.1   CR 0.61 0.57 0.72   GFRESTIMATED >90 >90 87   ROZINA  --  8.9 9.2   PROTTOTAL  --  6.5  --    ALBUMIN  --  3.0*  --    AST  --  79*  --    ALT  --  68*  --    ALKPHOS  --  122*  --    BILITOTAL  --  0.2  --      Coags:  No results for input(s): INR, PROTIME, PTT in the last 168 hours.    Invalid input(s): APTT  Cardiac Markers:  No results for input(s): CKTOTAL, TROPONINI in the last 168 hours.     Microbiology:  Respiratory viral panel PCR, 10/7: Positive for human rhinovirus/enterovirus  COVID19 PCR, 10/3: Negative  Influenza A/B, RSV swab, 10/3: Negative        Cardiology/Radiology:   Cardiac: All cardiac studies reviewed by me.    EKG:  Reviewed    TTE, 10/6/22:   Left ventricular size, wall motion and function are normal. The ejection fraction is 60-65%. Normal right ventricle size and systolic function. There is mild to moderate (1-2+) aortic regurgitation. IVC diameter <2.1 cm collapsing >50% with sniff suggests a normal RA pressure of 3 mmHg.    Radiology: All imaging studies reviewed by me.    Chest X-Ray, 10/3/22:  Linear and ill-defined patchy opacities throughout the mid and lower lungs compatible with pneumonia in the proper clinical setting. No pleural fusion. Shallow inspiration and mild elevation left hemidiaphragm exaggerate heart size and   pulmonary vascularity which are probably within normal  limits.    CT Chest PE protocol, 10/6/22:  FINDINGS: ANGIOGRAM CHEST: Limited due to respiratory motion. No central or definite peripheral pulmonary artery embolism. Thoracic aorta is negative for dissection. No CT evidence of right heart strain.  LUNGS AND PLEURA: Asymmetric elevation of the left hemidiaphragm. Mild tracheal secretions. Mosaic lung attenuation. Mild left lower lobar consolidation. Bilateral multilobar reticular nodular/tree-in-bud pulmonary opacities which are most pronounced within the lower lobes. Associated scattered small nodular pulmonary opacities including a 13 mm right lower lobe nodule image 163 series 4. Bilateral multilobar bronchial wall thickening and endobronchial mucoid impaction. No pleural effusion. MEDIASTINUM/AXILLAE: Mediastinal adenopathy. Normal heart size and no pericardial effusion. CORONARY ARTERY CALCIFICATION: None. UPPER ABDOMEN: Colonic diverticulosis. MUSCULOSKELETAL: Spinal degenerative changes. IMPRESSION: 1.  No pulmonary artery embolism. 2.  Findings consistent with bilateral multilobar bronchopneumonia most pronounced within the lung bases and perhaps reflecting aspiration. Recommend CT follow-up in approximately 4-8 weeks after treatment/symptom improvement to exclude underlying lung lesions. No pleural effusion.

## 2022-10-09 NOTE — PLAN OF CARE
"  Problem: Plan of Care - These are the overarching goals to be used throughout the patient stay.    Goal: Plan of Care Review/Shift Note  Description: The Plan of Care Review/Shift note should be completed every shift.  The Outcome Evaluation is a brief statement about your assessment that the patient is improving, declining, or no change.  This information will be displayed automatically on your shift note.  Outcome: Adequate for Care Transition  Goal: Patient-Specific Goal (Individualized)  Description: You can add care plan individualizations to a care plan. Examples of Individualization might be:  \"Parent requests to be called daily at 9am for status\", \"I have a hard time hearing out of my right ear\", or \"Do not touch me to wake me up as it startles me\".  Outcome: Adequate for Care Transition  Goal: Absence of Hospital-Acquired Illness or Injury  Outcome: Adequate for Care Transition  Intervention: Identify and Manage Fall Risk  Recent Flowsheet Documentation  Taken 10/9/2022 0724 by Mary Rowland, RN  Safety Promotion/Fall Prevention:   patient and family education   nonskid shoes/slippers when out of bed  Intervention: Prevent Skin Injury  Recent Flowsheet Documentation  Taken 10/9/2022 0724 by Mary Rowland RN  Body Position: position changed independently  Goal: Optimal Comfort and Wellbeing  Outcome: Adequate for Care Transition  Goal: Readiness for Transition of Care  Outcome: Adequate for Care Transition     Problem: Risk for Delirium  Goal: Optimal Coping  Outcome: Adequate for Care Transition  Goal: Improved Behavioral Control  Outcome: Adequate for Care Transition  Goal: Improved Attention and Thought Clarity  Outcome: Adequate for Care Transition  Goal: Improved Sleep  Outcome: Adequate for Care Transition     Problem: Fluid Imbalance (Pneumonia)  Goal: Fluid Balance  Outcome: Adequate for Care Transition     Problem: Infection (Pneumonia)  Goal: Resolution of Infection Signs and " Symptoms  Outcome: Adequate for Care Transition  Intervention: Prevent Infection Progression  Recent Flowsheet Documentation  Taken 10/9/2022 0724 by Mary Rowland RN  Isolation Precautions: droplet precautions maintained     Problem: Respiratory Compromise (Pneumonia)  Goal: Effective Oxygenation and Ventilation  Outcome: Adequate for Care Transition     Problem: Gas Exchange Impaired  Goal: Optimal Gas Exchange  Outcome: Adequate for Care Transition   Goal Outcome Evaluation:

## 2022-10-09 NOTE — PROGRESS NOTES
Reviewed AVS with pt at bedside including new medications and follow up appts. Pt medications called to primary pharmacy- [facility] [address]. No questions at time of discharge

## 2022-10-09 NOTE — PLAN OF CARE
Problem: Plan of Care - These are the overarching goals to be used throughout the patient stay.    Goal: Plan of Care Review/Shift Note  Outcome: Ongoing, Progressing  Flowsheets (Taken 10/8/2022 2352)  Plan of Care Reviewed With: patient  Overall Patient Progress: improving  Goal: Patient-Specific Goal (Individualized)  Outcome: Ongoing, Progressing  Goal: Absence of Hospital-Acquired Illness or Injury  Outcome: Ongoing, Progressing  Intervention: Identify and Manage Fall Risk  Recent Flowsheet Documentation  Taken 10/8/2022 1611 by Shannan Randolph RN  Safety Promotion/Fall Prevention:   assistive device/personal items within reach   nonskid shoes/slippers when out of bed   patient and family education   room door open   room organization consistent   safety round/check completed  Intervention: Prevent Skin Injury  Recent Flowsheet Documentation  Taken 10/8/2022 1611 by Shannan Randolph RN  Body Position: position changed independently  Intervention: Prevent and Manage VTE (Venous Thromboembolism) Risk  Recent Flowsheet Documentation  Taken 10/8/2022 1611 by Shannan Randolph RN  Activity Management:   up in chair   up ad dale  Goal: Optimal Comfort and Wellbeing  Outcome: Ongoing, Progressing  Goal: Readiness for Transition of Care  Outcome: Ongoing, Progressing     Problem: Infection (Pneumonia)  Goal: Resolution of Infection Signs and Symptoms  Outcome: Ongoing, Progressing     Problem: Respiratory Compromise (Pneumonia)  Goal: Effective Oxygenation and Ventilation  Outcome: Ongoing, Progressing  Intervention: Promote Airway Secretion Clearance  Recent Flowsheet Documentation  Taken 10/8/2022 1611 by Shannan Randolph RN  Cough And Deep Breathing: done independently per patient     Problem: Gas Exchange Impaired  Goal: Optimal Gas Exchange  Outcome: Ongoing, Progressing   Goal Outcome Evaluation:    Plan of Care Reviewed With: patient     Overall Patient Progress: improving    Denies pain. Afebrile.  Congested cough. On oral Zithromax now. Down to 90-91% on room air today, did want Oxygen on at bedtime, placed on 1L NC, continuous pulse ox intact. BP elevated on shift, notified Dr. Gaytan who ordered daily Amlodipine. Sepsis protocol fired on shift for tachycardia after neb treatment. Lactic acid at 4.0 although patient stated was feeling just fine. Called Code Sepsis per protocol. Spoke with Dr. Gaytan who stated sepsis protocol no longer needs to be called for this patient as patient is not believed to be septic. Updated patient.

## 2022-10-10 ENCOUNTER — PATIENT OUTREACH (OUTPATIENT)
Dept: CARE COORDINATION | Facility: CLINIC | Age: 72
End: 2022-10-10

## 2022-10-10 PROBLEM — E78.5 HYPERLIPIDEMIA LDL GOAL <100: Status: ACTIVE | Noted: 2022-10-10

## 2022-10-10 PROBLEM — J45.21 MILD INTERMITTENT ASTHMA WITH EXACERBATION: Status: ACTIVE | Noted: 2022-10-10

## 2022-10-10 PROBLEM — I10 ESSENTIAL HYPERTENSION: Status: ACTIVE | Noted: 2022-10-10

## 2022-10-10 PROBLEM — J96.01 ACUTE RESPIRATORY FAILURE WITH HYPOXIA (H): Status: ACTIVE | Noted: 2022-10-10

## 2022-10-10 LAB
BACTERIA SPT CULT: NORMAL
GRAM STAIN RESULT: NORMAL

## 2022-10-10 NOTE — PROGRESS NOTES
Clinic Care Coordination Contact  [facility]: Post-Discharge Note  SITUATION                                                      Admission:    Admission Date: 10/03/22   Reason for Admission: Community acquired pneumonia, unspecified laterality  Discharge:   Discharge Date: 10/09/22  Discharge Diagnosis: Community acquired pneumonia, unspecified laterality    BACKGROUND                                                      Per hospital discharge summary and inpatient provider notes:  Naila Talley is a 72 year old female with a medical history of asthma presents to ER for cough and SOB for 10 days. Found to have community acquired pneumonia and hypoxia.      Community acquired pneumonia: Presents with fever, productive cough, SOB, hypoxia and leukocytosis. Chest XR reveals patchy opacities throughout the mid and lower lungs compatible with pneumonia. COVID19 negative.   - Started Ceftriaxone and Azithromycin in ER. Will continue   - Neb scheduled and prn   - Antitussive  - Sputum gram stain and culture     Acute asthma exacerbation: History of asthma. Patient reports that she uses her rescue inhaler occasionally. She has never needed to be treated with steroid.   - Start solumedrol 40 mg IV q8h. Nebs as above     Acute hypoxemic respiratory failure: due to pneumonia and asthma exacerbation  - Needed supplemental nasal cannula oxygen 2 LPM on admission. Will continue and taper as tolerated.    ASSESSMENT           Discharge Assessment  How are you doing now that you are home?: Pt reports she is well. No questions or concerns  How are your symptoms? (Red Flag symptoms escalate to triage hotline per guidelines): Improved  Do you feel your condition is stable enough to be safe at home until your provider visit?: Yes  Does the patient have their discharge instructions? : Yes  Does the patient have questions regarding their discharge instructions? : No  Were you started on any new medications or were there  changes to any of your previous medications? : Yes  Does the patient have all of their medications?: Yes  Do you have questions regarding any of your medications? : No  Do you have all of your needed medical supplies or equipment (DME)?  (i.e. oxygen tank, CPAP, cane, etc.): Yes  Discharge follow-up appointment scheduled within 14 calendar days? : Yes  Discharge Follow Up Appointment Date: 10/14/22  Discharge Follow Up Appointment Scheduled with?: Specialty Care Provider              PLAN                                                      Outpatient Plan:  Follow-up and recommended labs and tests  * Follow up with primary care provider, Danelle Adam, within 7 days to  evaluate medication change and for hospital follow- up. No follow up  labs or test are needed. Follow up on asthma exacerbation and  hypertension. Blood pressure has been high during her hospital stay.  Amlodipine was started. Need to recheck blood pressure and adjust  medication as indicated.  * Follow up in the pulmonary clinic to complete a pulmonary function  test on November 14, then follow-up with Elizabeth Saeed NP in  pulmonary clinic on November 16.    Future Appointments   Date Time Provider Department Center   11/14/2022  2:00 PM [facility] PFT RM 1 [id number] [facility]   11/16/2022 10:15 AM Elizabeth Saeed, APRN CNP MRPULM [facility]         For any urgent concerns, please contact our 24 hour nurse triage line: [phone] ([phone])         TAMMY Sabillon  Connected Care Resource Center  [facility]     *Connected Care Resource Team does NOT follow patient ongoing. Referrals are identified based on internal discharge reports and the outreach is to ensure patient has an understanding of their discharge instructions.

## 2022-10-30 ENCOUNTER — LAB REQUISITION (OUTPATIENT)
Dept: LAB | Facility: CLINIC | Age: 72
End: 2022-10-30
Payer: MEDICARE

## 2022-10-30 DIAGNOSIS — R30.0 DYSURIA: ICD-10-CM

## 2022-10-30 PROCEDURE — 87086 URINE CULTURE/COLONY COUNT: CPT | Mod: ORL | Performed by: FAMILY MEDICINE

## 2022-11-01 LAB — BACTERIA UR CULT: NORMAL

## 2022-11-07 ENCOUNTER — LAB REQUISITION (OUTPATIENT)
Dept: LAB | Facility: CLINIC | Age: 72
End: 2022-11-07
Payer: MEDICARE

## 2022-11-07 DIAGNOSIS — I10 ESSENTIAL (PRIMARY) HYPERTENSION: ICD-10-CM

## 2022-11-07 DIAGNOSIS — E78.2 MIXED HYPERLIPIDEMIA: ICD-10-CM

## 2022-11-07 DIAGNOSIS — N81.6 RECTOCELE: ICD-10-CM

## 2022-11-07 LAB
ALBUMIN SERPL BCG-MCNC: 4.4 G/DL (ref 3.5–5.2)
ALP SERPL-CCNC: 84 U/L (ref 35–104)
ALT SERPL W P-5'-P-CCNC: 9 U/L (ref 10–35)
ANION GAP SERPL CALCULATED.3IONS-SCNC: 14 MMOL/L (ref 7–15)
AST SERPL W P-5'-P-CCNC: 25 U/L (ref 10–35)
BILIRUB SERPL-MCNC: 0.2 MG/DL
BUN SERPL-MCNC: 5.7 MG/DL (ref 8–23)
CALCIUM SERPL-MCNC: 9.5 MG/DL (ref 8.8–10.2)
CHLORIDE SERPL-SCNC: 103 MMOL/L (ref 98–107)
CHOLEST SERPL-MCNC: 162 MG/DL
CREAT SERPL-MCNC: 0.74 MG/DL (ref 0.51–0.95)
DEPRECATED HCO3 PLAS-SCNC: 22 MMOL/L (ref 22–29)
ERYTHROCYTE [DISTWIDTH] IN BLOOD BY AUTOMATED COUNT: 16.4 % (ref 10–15)
GFR SERPL CREATININE-BSD FRML MDRD: 84 ML/MIN/1.73M2
GLUCOSE SERPL-MCNC: 137 MG/DL (ref 70–99)
HCT VFR BLD AUTO: 40.4 % (ref 35–47)
HDLC SERPL-MCNC: 55 MG/DL
HGB BLD-MCNC: 12.4 G/DL (ref 11.7–15.7)
LDLC SERPL CALC-MCNC: 78 MG/DL
MCH RBC QN AUTO: 31.1 PG (ref 26.5–33)
MCHC RBC AUTO-ENTMCNC: 30.7 G/DL (ref 31.5–36.5)
MCV RBC AUTO: 101 FL (ref 78–100)
NONHDLC SERPL-MCNC: 107 MG/DL
PLATELET # BLD AUTO: 374 10E3/UL (ref 150–450)
POTASSIUM SERPL-SCNC: 4.1 MMOL/L (ref 3.4–5.3)
PROT SERPL-MCNC: 6.7 G/DL (ref 6.4–8.3)
RBC # BLD AUTO: 3.99 10E6/UL (ref 3.8–5.2)
SODIUM SERPL-SCNC: 139 MMOL/L (ref 136–145)
TRIGL SERPL-MCNC: 147 MG/DL
WBC # BLD AUTO: 4.9 10E3/UL (ref 4–11)

## 2022-11-07 PROCEDURE — 80053 COMPREHEN METABOLIC PANEL: CPT | Mod: ORL | Performed by: FAMILY MEDICINE

## 2022-11-07 PROCEDURE — 85027 COMPLETE CBC AUTOMATED: CPT | Mod: ORL | Performed by: FAMILY MEDICINE

## 2022-11-07 PROCEDURE — 87086 URINE CULTURE/COLONY COUNT: CPT | Mod: ORL | Performed by: FAMILY MEDICINE

## 2022-11-07 PROCEDURE — 80061 LIPID PANEL: CPT | Mod: ORL | Performed by: FAMILY MEDICINE

## 2022-11-09 LAB — BACTERIA UR CULT: NORMAL

## 2022-12-09 ENCOUNTER — OFFICE VISIT (OUTPATIENT)
Dept: PULMONOLOGY | Facility: CLINIC | Age: 72
End: 2022-12-09
Payer: MEDICARE

## 2022-12-09 ENCOUNTER — ALLIED HEALTH/NURSE VISIT (OUTPATIENT)
Dept: PULMONOLOGY | Facility: CLINIC | Age: 72
End: 2022-12-09
Attending: INTERNAL MEDICINE
Payer: MEDICARE

## 2022-12-09 VITALS
BODY MASS INDEX: 31.88 KG/M2 | DIASTOLIC BLOOD PRESSURE: 88 MMHG | OXYGEN SATURATION: 94 % | SYSTOLIC BLOOD PRESSURE: 134 MMHG | HEART RATE: 90 BPM | WEIGHT: 191.6 LBS

## 2022-12-09 DIAGNOSIS — R05.3 CHRONIC COUGH: ICD-10-CM

## 2022-12-09 DIAGNOSIS — J18.9 PNEUMONIA OF BOTH LUNGS DUE TO INFECTIOUS ORGANISM, UNSPECIFIED PART OF LUNG: ICD-10-CM

## 2022-12-09 DIAGNOSIS — J18.9 PNEUMONIA: ICD-10-CM

## 2022-12-09 DIAGNOSIS — J45.21 MILD INTERMITTENT ASTHMA WITH EXACERBATION: Primary | ICD-10-CM

## 2022-12-09 DIAGNOSIS — R09.82 POST-NASAL DRIP: ICD-10-CM

## 2022-12-09 LAB — HGB BLD-MCNC: 12.4 G/DL

## 2022-12-09 PROCEDURE — 99204 OFFICE O/P NEW MOD 45 MIN: CPT | Performed by: NURSE PRACTITIONER

## 2022-12-09 PROCEDURE — 94726 PLETHYSMOGRAPHY LUNG VOLUMES: CPT | Performed by: INTERNAL MEDICINE

## 2022-12-09 PROCEDURE — 94729 DIFFUSING CAPACITY: CPT | Performed by: INTERNAL MEDICINE

## 2022-12-09 PROCEDURE — 85018 HEMOGLOBIN: CPT | Performed by: INTERNAL MEDICINE

## 2022-12-09 PROCEDURE — 94060 EVALUATION OF WHEEZING: CPT | Performed by: INTERNAL MEDICINE

## 2022-12-09 RX ORDER — FLUTICASONE PROPIONATE 50 MCG
1 SPRAY, SUSPENSION (ML) NASAL DAILY
Qty: 16 G | Refills: 3 | Status: SHIPPED | OUTPATIENT
Start: 2022-12-09 | End: 2023-05-23

## 2022-12-09 RX ORDER — FLUTICASONE PROPIONATE AND SALMETEROL XINAFOATE 230; 21 UG/1; UG/1
2 AEROSOL, METERED RESPIRATORY (INHALATION) 2 TIMES DAILY
Qty: 12 G | Refills: 3 | Status: SHIPPED | OUTPATIENT
Start: 2022-12-09 | End: 2023-03-09 | Stop reason: SINTOL

## 2022-12-09 ASSESSMENT — ASTHMA QUESTIONNAIRES
QUESTION_3 LAST FOUR WEEKS HOW OFTEN DID YOUR ASTHMA SYMPTOMS (WHEEZING, COUGHING, SHORTNESS OF BREATH, CHEST TIGHTNESS OR PAIN) WAKE YOU UP AT NIGHT OR EARLIER THAN USUAL IN THE MORNING: TWO OR THREE NIGHTS A WEEK
ACT_TOTALSCORE: 18
EMERGENCY_ROOM_LAST_YEAR_TOTAL: ONE
QUESTION_5 LAST FOUR WEEKS HOW WOULD YOU RATE YOUR ASTHMA CONTROL: POORLY CONTROLLED
QUESTION_4 LAST FOUR WEEKS HOW OFTEN HAVE YOU USED YOUR RESCUE INHALER OR NEBULIZER MEDICATION (SUCH AS ALBUTEROL): NOT AT ALL
QUESTION_1 LAST FOUR WEEKS HOW MUCH OF THE TIME DID YOUR ASTHMA KEEP YOU FROM GETTING AS MUCH DONE AT WORK, SCHOOL OR AT HOME: NONE OF THE TIME
ACT_TOTALSCORE: 18
QUESTION_2 LAST FOUR WEEKS HOW OFTEN HAVE YOU HAD SHORTNESS OF BREATH: ONCE OR TWICE A WEEK

## 2022-12-09 NOTE — PROGRESS NOTES
Pulmonary Outpatient Consult Note  December 9, 2022    Assessment and Plan:   #. Probable mild persistent asthma -- likely based on history and recent trigger from viral infection. Has tried Kirti many times in the past with minimal change in symptoms. PFTs are normal and show no significant bronchodilator response.   #. Chronic cough -- most likely trigger is asthma but also has frequent throat clearing and post nasal drip. No concern for reflux.   #. Pneumonia secondary to rhinovirus -- back to baseline chronic cough. Will get follow up CT scan in the next week to be sure inflammation has cleared and there is no underlying parenchymal issues or nodules.       CT chest, will call with results     Trial Advair HFA  BID, rinse and gargle after each use.     Trial Flonase BID, if cough and post nasal drip improve continue this. Could add atrovent nasal spray if helpful but not resolved.    RTC 2-3 months.    Elizabeth Saeed, CNP  Pulmonary Medicine  ______________________________________________________________________________    CC:   Chief Complaint   Patient presents with     Follow Up     Hospital-asthma        HPI:   Naila Talley is a 72 year old female with history of asthma and hyperlipidemia, presenting today for hospital follow up and establishment of care. She was hospitalized from 10/3-10/9/22 for an asthma exacerbation following 10 days of increased cough. CXR suggested pneumonia so she was given IV antibiotics. She had increased cough, wheeze, and increased supplemental oxygen needs from 2L-4L during her stay despite being on IV solumedrol. This eventually improved and she was discharged on room air. During her stay it was found she had rhinovirus. Reports being back to her baseline with a chronic dry cough. No SOB or wheeze.  She reports a childhood history of asthma with frequent bronchitis and croup infections. She has never had formal PFTs. She has tried what sounds like Kirti many times over the  years with minimal help, she thinks she has taking oral steroids in the past but does not remember if they helped. She reports a chronic cough for 20 years she has just dealt with.   Denies reflux, reports having a previous esophagram that was negative. Dinner around 6pm. Bedtime around 11pm.   She admits to frequent throat clearing and post nasal drip, she has not tried anything for this. Denies significant seasonal or environmental allergies.     ACT Total Scores 12/9/2022   ACT TOTAL SCORE (Goal Greater than or Equal to 20) 18   In the past 12 months, how many times did you visit the emergency room for your asthma without being admitted to the hospital? 1   In the past 12 months, how many times were you hospitalized overnight because of your asthma? 0       PMH:  Patient Active Problem List    Diagnosis Date Noted     Acute respiratory failure with hypoxia (H) 10/10/2022     Priority: Medium     Mild intermittent asthma with exacerbation 10/10/2022     Priority: Medium     Essential hypertension 10/10/2022     Priority: Medium     Hyperlipidemia LDL goal <100 10/10/2022     Priority: Medium     Community acquired pneumonia, unspecified laterality 10/03/2022     Priority: Medium       PSH:  No past surgical history on file.    Current Meds:  Current Outpatient Medications   Medication Sig Dispense Refill     albuterol (PROAIR HFA/PROVENTIL HFA/VENTOLIN HFA) 108 (90 Base) MCG/ACT inhaler Inhale 2 puffs into the lungs every 6 hours as needed for shortness of breath / dyspnea or wheezing 18 g 0     amLODIPine (NORVASC) 5 MG tablet Take 1 tablet (5 mg) by mouth daily 30 tablet 0     aspirin 81 MG EC tablet Take 81 mg by mouth At Bedtime       calcium citrate (CITRACAL) 950 (200 Ca) MG tablet Take 1 tablet by mouth daily (with lunch)       cholecalciferol (VITAMIN D3) 25 mcg (1000 units) capsule Take 1 capsule by mouth daily (with dinner)       fluticasone (FLONASE) 50 MCG/ACT nasal spray Spray 1 spray into both  nostrils daily 16 g 3     fluticasone-salmeterol (ADVAIR-HFA) 230-21 MCG/ACT inhaler Inhale 2 puffs into the lungs 2 times daily 12 g 3     guaiFENesin-dextromethorphan (ROBITUSSIN DM) 100-10 MG/5ML syrup Take 10 mLs by mouth every 4 hours as needed for cough       Multiple Vitamins-Minerals (OCUVITE PRESERVISION PO) Take 1 tablet by mouth daily (with dinner)       multivitamin, therapeutic (THERA-VIT) TABS tablet Take 1 tablet by mouth daily       Omega-3-6-9 CAPS Take 1 capsule by mouth daily       rosuvastatin (CRESTOR) 5 MG tablet Take 5 mg by mouth At Bedtime       traZODone (DESYREL) 100 MG tablet Take 100 mg by mouth At Bedtime       ipratropium - albuterol 0.5 mg/2.5 mg/3 mL (DUONEB) 0.5-2.5 (3) MG/3ML neb solution Take 1 vial (3 mLs) by nebulization 4 times daily as needed for wheezing or shortness of breath / dyspnea 90 mL 0     nystatin (MYCOSTATIN) 364562 UNIT/GM external powder Apply 1 Application topically 2 times daily as needed       pantoprazole (PROTONIX) 40 MG EC tablet Take 1 tablet (40 mg) by mouth every morning (before breakfast) 14 tablet 0         Allergies:  No Known Allergies    Social Hx:  Marital status:   Number of children: 2, grandchildren 1   Resides in a townhouse [address] yr old, no concern for mold.  Occupational history: office work    service: no  Pets: dogs in the past, birds in the past   Smoking history: 0 pack year history  Alcohol use: none   Recreational drug use: none  Hobbies: quilt and paint  Recent Travel: none in the past 5 years, [place] for 20 years     Family HX: family history is not on file.    ROS:   ROS: 12-point review performed and notable for the above mentioned symptoms. The remainder reviewed and negative.     Physical Exam:  /88 (BP Location: Left arm, Patient Position: Chair, Cuff Size: Adult Large)   Pulse 90   Wt 86.9 kg (191 lb 9.6 oz)   SpO2 94%   BMI 31.88 kg/m      Physical Exam  Constitutional:       General: She is not in acute  distress.     Appearance: She is not ill-appearing or diaphoretic.   HENT:      Nose: Nose normal.      Mouth/Throat:      Comments: Frequent throat clearing during visit  Cardiovascular:      Rate and Rhythm: Normal rate and regular rhythm.      Pulses: Normal pulses.      Heart sounds: Normal heart sounds.   Pulmonary:      Effort: Pulmonary effort is normal. No respiratory distress.      Breath sounds: No wheezing or rhonchi.   Musculoskeletal:      Right lower leg: No edema.      Left lower leg: No edema.   Skin:     General: Skin is warm and dry.      Findings: No rash.   Neurological:      Mental Status: She is alert.   Psychiatric:         Behavior: Behavior normal.       PFT's (12/9/22):       Impression:  Full Pulmonary Function Test is abnormal.  PFTs are consistent with no obstructive disease.  Spirometry is not consistent with reversibility.  There is no hyperinflation.  There is no air-trapping.  Diffusion capacity when corrected for hemoglobin is not reduced.    Labs:   personally reviewed in the EMR.     Latest Reference Range & Units 10/07/22 23:13   Human Rhin/Enterovirus Not Detected  Detected !       Imaging studies: personally reviewed and interpreted. Below are the Radiology interpretations.    EXAM: CT CHEST PULMONARY EMBOLISM W CONTRAST  LOCATION: [facility]  DATE/TIME: 10/6/2022 6:26 PM  INDICATION: acute hypoxic respiratory failure, elevated D dimer  COMPARISON: None.  FINDINGS:  ANGIOGRAM CHEST: Limited due to respiratory motion. No central or definite peripheral pulmonary artery embolism. Thoracic aorta is negative for dissection. No CT evidence of right heart strain.  LUNGS AND PLEURA: Asymmetric elevation of the left hemidiaphragm. Mild tracheal secretions. Mosaic lung attenuation. Mild left lower lobar consolidation. Bilateral multilobar reticular nodular/tree-in-bud pulmonary opacities which are most pronounced   within the lower lobes. Associated scattered  small nodular pulmonary opacities including a 13 mm right lower lobe nodule image 163 series 4. Bilateral multilobar bronchial wall thickening and endobronchial mucoid impaction. No pleural effusion.  MEDIASTINUM/AXILLAE: Mediastinal adenopathy. Normal heart size and no pericardial effusion.  CORONARY ARTERY CALCIFICATION: None.  UPPER ABDOMEN: Colonic diverticulosis.  MUSCULOSKELETAL: Spinal degenerative changes.                                                                IMPRESSION:  1.  No pulmonary artery embolism.  2.  Findings consistent with bilateral multilobar bronchopneumonia most pronounced within the lung bases and perhaps reflecting aspiration. Recommend CT follow-up in approximately 4-8 weeks after treatment/symptom improvement to exclude underlying lung lesions. No pleural effusion.

## 2022-12-09 NOTE — PATIENT INSTRUCTIONS
- start the daily inhaler (Advair) twice daily no matter what. Rinse and gargle after each use.   - try the Flonase nasal spray 1-2 times daily for at least 1 month. If you notice less throat cearing and post nasal drip continue this. if not change you can stop this.   - we will repeat the CT scan 8 weeks out from the hospital stay.     If you have worsening symptoms, questions, or need to speak with the nurse please call [phone].

## 2022-12-15 ENCOUNTER — LAB REQUISITION (OUTPATIENT)
Dept: LAB | Facility: CLINIC | Age: 72
End: 2022-12-15
Payer: MEDICARE

## 2022-12-15 DIAGNOSIS — R30.0 DYSURIA: ICD-10-CM

## 2022-12-15 PROCEDURE — 87086 URINE CULTURE/COLONY COUNT: CPT | Mod: ORL | Performed by: FAMILY MEDICINE

## 2022-12-17 LAB — BACTERIA UR CULT: ABNORMAL

## 2022-12-24 LAB
DLCOCOR-%PRED-PRE: 106 %
DLCOCOR-PRE: 21.22 ML/MIN/MMHG
DLCOUNC-%PRED-PRE: 103 %
DLCOUNC-PRE: 20.54 ML/MIN/MMHG
DLCOUNC-PRED: 19.84 ML/MIN/MMHG
ERV-%PRED-PRE: 36 %
ERV-PRE: 0.18 L
ERV-PRED: 0.5 L
EXPTIME-PRE: 9.81 SEC
FEF2575-%PRED-POST: 124 %
FEF2575-%PRED-PRE: 83 %
FEF2575-POST: 2.22 L/SEC
FEF2575-PRE: 1.48 L/SEC
FEF2575-PRED: 1.77 L/SEC
FEFMAX-%PRED-PRE: 95 %
FEFMAX-PRE: 5.23 L/SEC
FEFMAX-PRED: 5.48 L/SEC
FEV1-%PRED-PRE: 94 %
FEV1-PRE: 2.04 L
FEV1FEV6-PRE: 74 %
FEV1FEV6-PRED: 79 %
FEV1FVC-PRE: 74 %
FEV1FVC-PRED: 78 %
FEV1SVC-PRE: 74 %
FEV1SVC-PRED: 70 %
FIFMAX-PRE: 3.15 L/SEC
FRCPLETH-%PRED-PRE: 77 %
FRCPLETH-PRE: 2.16 L
FRCPLETH-PRED: 2.77 L
FVC-%PRED-PRE: 98 %
FVC-PRE: 2.77 L
FVC-PRED: 2.82 L
IC-%PRED-PRE: 98 %
IC-PRE: 2.58 L
IC-PRED: 2.61 L
RVPLETH-%PRED-PRE: 91 %
RVPLETH-PRE: 1.98 L
RVPLETH-PRED: 2.17 L
TLCPLETH-%PRED-PRE: 92 %
TLCPLETH-PRE: 4.74 L
TLCPLETH-PRED: 5.11 L
VA-%PRED-PRE: 87 %
VA-PRE: 4.25 L
VC-%PRED-PRE: 88 %
VC-PRE: 2.76 L
VC-PRED: 3.1 L

## 2022-12-27 ENCOUNTER — HOSPITAL ENCOUNTER (OUTPATIENT)
Dept: CT IMAGING | Facility: HOSPITAL | Age: 72
Discharge: HOME | End: 2022-12-27
Attending: NURSE PRACTITIONER | Admitting: NURSE PRACTITIONER
Payer: MEDICARE

## 2022-12-27 DIAGNOSIS — J18.9 PNEUMONIA OF BOTH LUNGS DUE TO INFECTIOUS ORGANISM, UNSPECIFIED PART OF LUNG: ICD-10-CM

## 2022-12-27 PROCEDURE — G1010 CDSM STANSON: HCPCS

## 2022-12-27 PROCEDURE — 71250 CT THORAX DX C-: CPT | Mod: ME

## 2022-12-28 ENCOUNTER — TELEPHONE (OUTPATIENT)
Dept: PULMONOLOGY | Facility: OTHER | Age: 72
End: 2022-12-28

## 2022-12-28 NOTE — TELEPHONE ENCOUNTER
Spoke with Naila regarding her follow up chest CT scan to assess her lungs after her recent pneumonia. The CT looks good with resolution of the opacities, it appears there is very minimal bronchial wall thickening and some suggestion of small airway disease which may explain her chronic cough. I suggested she start the Advair inhaler daily until we see each other again in a few months. She is agreeable to this plan and has no further questions.     Small micro nodules noted are not of concern as she was a never smoker. We will discuss possible follow up at a later appointment but I do not recommend routine follow up at this point.     Elizabeth Saeed, CNP  Pulmonary Medicine  [facility]   [phone]        EXAM: CT CHEST W/O CONTRAST  LOCATION: [facility]  DATE/TIME: 12/27/2022 12:45 PM  INDICATION: Follow up pneumonia.  COMPARISON: 10/06/2022  TECHNIQUE: CT chest without IV contrast. Multiplanar reformats were obtained. Dose reduction techniques were used.  CONTRAST: None.  FINDINGS:   LUNGS AND PLEURA: Interval resolution of the patchy multifocal groundglass, consolidative, and tree-in-bud opacities. Mild scattered atelectasis and/or scarring. No new focal consolidation or effusion. There are a few similar scattered pulmonary   micronodules with the largest measuring up to 4 mm in the left upper lobe (series 4/103 and 4/56). There are airway secretions. Mild bronchial wall thickening. No new focal consolidation. Mild mosaic attenuation of the lungs.  MEDIASTINUM/AXILLAE: Heart size normal. No lymphadenopathy. No thoracic aortic aneurysm.  CORONARY ARTERY CALCIFICATION: Minimal.  UPPER ABDOMEN: Cholelithiasis. Diverticulosis.  MUSCULOSKELETAL: Degenerative change of the spine.                                                               IMPRESSION:   1.  Interval resolution of the patchy multifocal pulmonary opacities.  2.  Mild mosaic attenuation of the lungs can be seen with air  trapping or small airways disease.  3.  Mild bronchial wall thickening.  4.  Similar scattered pulmonary micronodules measuring up to 4 mm. Follow-up guidelines as below.  REFERENCE:  Guidelines for Management of Incidental Pulmonary Nodules Detected on CT Images: From the Fleischner Society 2017.   Guidelines apply to incidental nodules in patients who are 35 years or older.  Guidelines do not apply to lung cancer screening, patients with immunosuppression, or patients with known primary cancer.  MULTIPLE NODULES  Nodule size <6 mm  Low-risk patients: No follow-up needed.  High-risk patients: Optional follow-up at 12 months.  Consider referral to lung nodule clinic.

## 2023-01-30 ENCOUNTER — LAB REQUISITION (OUTPATIENT)
Dept: LAB | Facility: CLINIC | Age: 73
End: 2023-01-30
Payer: MEDICARE

## 2023-01-30 PROCEDURE — 88305 TISSUE EXAM BY PATHOLOGIST: CPT | Mod: TC,ORL | Performed by: FAMILY MEDICINE

## 2023-02-08 LAB
PATH REPORT.COMMENTS IMP SPEC: NORMAL
PATH REPORT.FINAL DX SPEC: NORMAL
PATH REPORT.GROSS SPEC: NORMAL
PATH REPORT.MICROSCOPIC SPEC OTHER STN: NORMAL
PATH REPORT.RELEVANT HX SPEC: NORMAL
PHOTO IMAGE: NORMAL

## 2023-03-08 PROCEDURE — 88305 TISSUE EXAM BY PATHOLOGIST: CPT | Mod: 26 | Performed by: PATHOLOGY

## 2023-03-09 ENCOUNTER — OFFICE VISIT (OUTPATIENT)
Dept: PULMONOLOGY | Facility: CLINIC | Age: 73
End: 2023-03-09
Payer: MEDICARE

## 2023-03-09 ENCOUNTER — LAB (OUTPATIENT)
Dept: LAB | Facility: HOSPITAL | Age: 73
End: 2023-03-09
Payer: MEDICARE

## 2023-03-09 VITALS
HEART RATE: 82 BPM | BODY MASS INDEX: 32.42 KG/M2 | DIASTOLIC BLOOD PRESSURE: 60 MMHG | WEIGHT: 194.8 LBS | OXYGEN SATURATION: 97 % | SYSTOLIC BLOOD PRESSURE: 118 MMHG

## 2023-03-09 DIAGNOSIS — J45.21 MILD INTERMITTENT ASTHMA WITH EXACERBATION: ICD-10-CM

## 2023-03-09 DIAGNOSIS — R09.82 POST-NASAL DRIP: ICD-10-CM

## 2023-03-09 DIAGNOSIS — J45.21 MILD INTERMITTENT ASTHMA WITH EXACERBATION: Primary | ICD-10-CM

## 2023-03-09 DIAGNOSIS — R05.3 CHRONIC COUGH: ICD-10-CM

## 2023-03-09 PROCEDURE — 82785 ASSAY OF IGE: CPT

## 2023-03-09 PROCEDURE — 99214 OFFICE O/P EST MOD 30 MIN: CPT | Performed by: NURSE PRACTITIONER

## 2023-03-09 PROCEDURE — 86003 ALLG SPEC IGE CRUDE XTRC EA: CPT

## 2023-03-09 PROCEDURE — 36415 COLL VENOUS BLD VENIPUNCTURE: CPT

## 2023-03-09 ASSESSMENT — ASTHMA QUESTIONNAIRES
EMERGENCY_ROOM_LAST_YEAR_TOTAL: ONE
QUESTION_2 LAST FOUR WEEKS HOW OFTEN HAVE YOU HAD SHORTNESS OF BREATH: NOT AT ALL
QUESTION_5 LAST FOUR WEEKS HOW WOULD YOU RATE YOUR ASTHMA CONTROL: POORLY CONTROLLED
ACT_TOTALSCORE: 18
HOSPITALIZATION_OVERNIGHT_LAST_YEAR_TOTAL: ONE
QUESTION_3 LAST FOUR WEEKS HOW OFTEN DID YOUR ASTHMA SYMPTOMS (WHEEZING, COUGHING, SHORTNESS OF BREATH, CHEST TIGHTNESS OR PAIN) WAKE YOU UP AT NIGHT OR EARLIER THAN USUAL IN THE MORNING: FOUR OR MORE NIGHTS A WEEK
QUESTION_4 LAST FOUR WEEKS HOW OFTEN HAVE YOU USED YOUR RESCUE INHALER OR NEBULIZER MEDICATION (SUCH AS ALBUTEROL): NOT AT ALL
QUESTION_1 LAST FOUR WEEKS HOW MUCH OF THE TIME DID YOUR ASTHMA KEEP YOU FROM GETTING AS MUCH DONE AT WORK, SCHOOL OR AT HOME: NONE OF THE TIME
ACT_TOTALSCORE: 18

## 2023-03-09 NOTE — PROGRESS NOTES
Pulmonary Outpatient Consult Note  March 9, 2023    Assessment and Plan:   #. Probable mild persistent asthma -- likely based on history and recent trigger from viral infection. Has tried Kirti many times in the past with minimal change in symptoms. PFTs are normal and show no significant bronchodilator response.   #. Chronic cough -- most likely trigger is asthma but also has frequent throat clearing and post nasal drip. No concern for reflux.   #. Pneumonia secondary to rhinovirus -- resolved on CT on 12/27/22, minimal bronchial wall thickening and some suggestion of small airway disease. Small micro nodules noted are not of concern as she was a never smoker.       IgE and [place] allergy panel. Could consider singulair if abnormal.     Discontinue Advair.     Continue albuterol q4h prn     Niox today in clinic is 16     Continue Flonase BID, if cough and post nasal drip persist could add atrovent nasal spray.     RTC 6 months    Elizabeth Saeed, [facility]  Pulmonary Medicine  ______________________________________________________________________________    CC:   Chief Complaint   Patient presents with     Follow Up     Asthma        HPI:   Naila Talley is a 72 year old female with history of asthma and hyperlipidemia, presenting today for hospital follow up for her asthma. She was last seen in clinic on 12/9/22 where we started a trial of Advair, she used this consistently for one month and noticed no difference in her symptoms but she did have significant hair loss so she discontinued use, of note, her hair has stopped falling out since stopping the Advair. She has not needed to use her albuterol and has been managing her mild cough with cough drops. She still endorses frequent throat clearing. Denies sinus pressure or congestion.   No SOB or wheeze.     She was hospitalized from 10/3-10/9/22 for an asthma exacerbation following 10 days of increased cough. CXR suggested pneumonia so she was given IV  antibiotics. She had increased cough, wheeze, and increased supplemental oxygen needs from 2L-4L during her stay despite being on IV solumedrol. This eventually improved and she was discharged on room air. During her stay it was found she had rhinovirus.   She reports a childhood history of asthma with frequent bronchitis and croup infections. She had never had formal PFTs to diagnose this. She has tried what sounds like Kirti many times over the years with minimal help, she thinks she has taking oral steroids in the past but does not remember if they helped. She reports a chronic cough for 20 years she has just dealt with.   Denies reflux, reports having a previous esophagram that was negative. Dinner around 6pm. Bedtime around 11pm.   She admits to frequent throat clearing and post nasal drip, she has not tried anything for this. Denies significant seasonal or environmental allergies.     ACT Total Scores 12/9/2022 3/9/2023   ACT TOTAL SCORE (Goal Greater than or Equal to 20) 18 18   In the past 12 months, how many times did you visit the emergency room for your asthma without being admitted to the hospital? 1 1   In the past 12 months, how many times were you hospitalized overnight because of your asthma? 0 1       PMH:  Patient Active Problem List    Diagnosis Date Noted     Acute respiratory failure with hypoxia (H) 10/10/2022     Priority: Medium     Mild intermittent asthma with exacerbation 10/10/2022     Priority: Medium     Essential hypertension 10/10/2022     Priority: Medium     Hyperlipidemia LDL goal <100 10/10/2022     Priority: Medium     Community acquired pneumonia, unspecified laterality 10/03/2022     Priority: Medium       PSH:  No past surgical history on file.    Current Meds:  Current Outpatient Medications   Medication Sig Dispense Refill     albuterol (PROAIR HFA/PROVENTIL HFA/VENTOLIN HFA) 108 (90 Base) MCG/ACT inhaler Inhale 2 puffs into the lungs every 6 hours as needed for shortness of  breath / dyspnea or wheezing 18 g 0     aspirin 81 MG EC tablet Take 81 mg by mouth At Bedtime       calcium citrate (CITRACAL) 950 (200 Ca) MG tablet Take 1 tablet by mouth daily (with lunch)       cholecalciferol (VITAMIN D3) 25 mcg (1000 units) capsule Take 1 capsule by mouth daily (with dinner)       fluticasone (FLONASE) 50 MCG/ACT nasal spray Spray 1 spray into both nostrils daily 16 g 3     guaiFENesin-dextromethorphan (ROBITUSSIN DM) 100-10 MG/5ML syrup Take 10 mLs by mouth every 4 hours as needed for cough       Multiple Vitamins-Minerals (OCUVITE PRESERVISION PO) Take 1 tablet by mouth daily (with dinner)       multivitamin, therapeutic (THERA-VIT) TABS tablet Take 1 tablet by mouth daily       Omega-3-6-9 CAPS Take 1 capsule by mouth daily       rosuvastatin (CRESTOR) 5 MG tablet Take 5 mg by mouth At Bedtime       traZODone (DESYREL) 100 MG tablet Take 100 mg by mouth At Bedtime           Allergies:  Allergies   Allergen Reactions     Advair Diskus      Pt states her hair fell out taking this medication        Social Hx:  Marital status:   Number of children: 2, grandchildren 1   Resides in a townhouse [address] yr old, no concern for mold.  Occupational history: office work    service: no  Pets: dogs in the past, birds in the past   Smoking history: 0 pack year history  Alcohol use: none   Recreational drug use: none  Hobbies: quilt and paint  Recent Travel: none in the past 5 years, [place] for 20 years     Family HX: family history is not on file.    ROS:   ROS: 12-point review performed and notable for the above mentioned symptoms. The remainder reviewed and negative.     Physical Exam:  /60 (BP Location: Right arm, Patient Position: Chair, Cuff Size: Adult Large)   Pulse 82   Wt 88.4 kg (194 lb 12.8 oz)   SpO2 97%   BMI 32.42 kg/m      Physical Exam  Constitutional:       General: She is not in acute distress.     Appearance: She is not ill-appearing or diaphoretic.   HENT:       Nose: Nose normal.      Mouth/Throat:      Comments: Frequent throat clearing during visit  Cardiovascular:      Rate and Rhythm: Normal rate and regular rhythm.      Pulses: Normal pulses.      Heart sounds: Normal heart sounds.   Pulmonary:      Effort: Pulmonary effort is normal. No respiratory distress.      Breath sounds: No wheezing or rhonchi.   Musculoskeletal:      Right lower leg: No edema.      Left lower leg: No edema.   Skin:     General: Skin is warm and dry.      Findings: No rash.   Neurological:      Mental Status: She is alert.   Psychiatric:         Behavior: Behavior normal.       PFT's (12/9/22):       Impression:  Full Pulmonary Function Test is abnormal.  PFTs are consistent with no obstructive disease.  Spirometry is not consistent with reversibility.  There is no hyperinflation.  There is no air-trapping.  Diffusion capacity when corrected for hemoglobin is not reduced.    Labs:   personally reviewed in the EMR.     Latest Reference Range & Units 10/07/22 23:13   Human Rhin/Enterovirus Not Detected  Detected !       Imaging studies: personally reviewed and interpreted. Below are the Radiology interpretations.    EXAM: CT CHEST PULMONARY EMBOLISM W CONTRAST  LOCATION: [facility]  DATE/TIME: 10/6/2022 6:26 PM  INDICATION: acute hypoxic respiratory failure, elevated D dimer  COMPARISON: None.  FINDINGS:  ANGIOGRAM CHEST: Limited due to respiratory motion. No central or definite peripheral pulmonary artery embolism. Thoracic aorta is negative for dissection. No CT evidence of right heart strain.  LUNGS AND PLEURA: Asymmetric elevation of the left hemidiaphragm. Mild tracheal secretions. Mosaic lung attenuation. Mild left lower lobar consolidation. Bilateral multilobar reticular nodular/tree-in-bud pulmonary opacities which are most pronounced   within the lower lobes. Associated scattered small nodular pulmonary opacities including a 13 mm right lower lobe nodule image  163 series 4. Bilateral multilobar bronchial wall thickening and endobronchial mucoid impaction. No pleural effusion.  MEDIASTINUM/AXILLAE: Mediastinal adenopathy. Normal heart size and no pericardial effusion.  CORONARY ARTERY CALCIFICATION: None.  UPPER ABDOMEN: Colonic diverticulosis.  MUSCULOSKELETAL: Spinal degenerative changes.                                                                IMPRESSION:  1.  No pulmonary artery embolism.  2.  Findings consistent with bilateral multilobar bronchopneumonia most pronounced within the lung bases and perhaps reflecting aspiration. Recommend CT follow-up in approximately 4-8 weeks after treatment/symptom improvement to exclude underlying lung lesions. No pleural effusion.    CT CHEST W/O CONTRAST, DATE/TIME: 12/27/2022 12:45 PM  INDICATION: Follow up pneumonia.  COMPARISON: 10/06/2022  FINDINGS:   LUNGS AND PLEURA: Interval resolution of the patchy multifocal groundglass, consolidative, and tree-in-bud opacities. Mild scattered atelectasis and/or scarring. No new focal consolidation or effusion. There are a few similar scattered pulmonary   micronodules with the largest measuring up to 4 mm in the left upper lobe (series 4/103 and 4/56). There are airway secretions. Mild bronchial wall thickening. No new focal consolidation. Mild mosaic attenuation of the lungs.  MEDIASTINUM/AXILLAE: Heart size normal. No lymphadenopathy. No thoracic aortic aneurysm.                                                             IMPRESSION:   1.  Interval resolution of the patchy multifocal pulmonary opacities.  2.  Mild mosaic attenuation of the lungs can be seen with air trapping or small airways disease.  3.  Mild bronchial wall thickening.  4.  Similar scattered pulmonary micronodules measuring up to 4 mm. Follow-up guidelines as below.

## 2023-03-13 LAB — IGE SERPL-ACNC: <2 KU/L (ref 0–114)

## 2023-03-14 LAB

## 2023-03-20 ENCOUNTER — TELEPHONE (OUTPATIENT)
Dept: GASTROENTEROLOGY | Facility: CLINIC | Age: 73
End: 2023-03-20
Payer: MEDICARE

## 2023-03-20 NOTE — TELEPHONE ENCOUNTER
[facility] / [facility] Coordinator Name:   Return call number: [phone]    Insurance: medicare/aarp  We do not accept [employer] patients.    SCHEDULED PROVIDER:  MARIBETH PROVIDERS: SOTERO  No orders needed if scheduled with their provider.    DATE: 5/23   TIME 9:35 am  LOCATION: [facility]  PROCEDURE: Lower Endoscopy [Colonoscopy]   DIAGNOSIS : Rectal prolapse   SEDATION: CS      Details/Prep Information -  [facility]  Confirm who is responsible for sending prep details.    ADDITIONAL INFORMATION:

## 2023-05-01 ENCOUNTER — LAB REQUISITION (OUTPATIENT)
Dept: LAB | Facility: CLINIC | Age: 73
End: 2023-05-01
Payer: MEDICARE

## 2023-05-01 DIAGNOSIS — I10 ESSENTIAL (PRIMARY) HYPERTENSION: ICD-10-CM

## 2023-05-01 LAB — HGB BLD-MCNC: 12.8 G/DL (ref 11.7–15.7)

## 2023-05-01 PROCEDURE — 85018 HEMOGLOBIN: CPT | Mod: ORL | Performed by: FAMILY MEDICINE

## 2023-05-23 ENCOUNTER — ANESTHESIA EVENT (OUTPATIENT)
Dept: SURGERY | Facility: CLINIC | Age: 73
DRG: 743 | End: 2023-05-23
Payer: MEDICARE

## 2023-05-23 ENCOUNTER — HOSPITAL ENCOUNTER (OUTPATIENT)
Facility: CLINIC | Age: 73
Discharge: HOME | DRG: 743 | End: 2023-05-23
Attending: COLON & RECTAL SURGERY | Admitting: COLON & RECTAL SURGERY
Payer: MEDICARE

## 2023-05-23 VITALS
TEMPERATURE: 97.4 F | SYSTOLIC BLOOD PRESSURE: 152 MMHG | HEIGHT: 65 IN | DIASTOLIC BLOOD PRESSURE: 96 MMHG | RESPIRATION RATE: 20 BRPM | WEIGHT: 190 LBS | HEART RATE: 92 BPM | BODY MASS INDEX: 31.65 KG/M2 | OXYGEN SATURATION: 97 %

## 2023-05-23 LAB — COLONOSCOPY: NORMAL

## 2023-05-23 PROCEDURE — 99153 MOD SED SAME PHYS/QHP EA: CPT | Performed by: COLON & RECTAL SURGERY

## 2023-05-23 PROCEDURE — 250N000013 HC RX MED GY IP 250 OP 250 PS 637: Performed by: COLON & RECTAL SURGERY

## 2023-05-23 PROCEDURE — 250N000011 HC RX IP 250 OP 636: Performed by: COLON & RECTAL SURGERY

## 2023-05-23 PROCEDURE — 0DBH8ZX EXCISION OF CECUM, VIA NATURAL OR ARTIFICIAL OPENING ENDOSCOPIC, DIAGNOSTIC: ICD-10-PCS | Performed by: COLON & RECTAL SURGERY

## 2023-05-23 PROCEDURE — 88305 TISSUE EXAM BY PATHOLOGIST: CPT | Mod: TC | Performed by: COLON & RECTAL SURGERY

## 2023-05-23 PROCEDURE — 45380 COLONOSCOPY AND BIOPSY: CPT | Performed by: COLON & RECTAL SURGERY

## 2023-05-23 PROCEDURE — 0DBP8ZX EXCISION OF RECTUM, VIA NATURAL OR ARTIFICIAL OPENING ENDOSCOPIC, DIAGNOSTIC: ICD-10-PCS | Performed by: COLON & RECTAL SURGERY

## 2023-05-23 PROCEDURE — 0DBB8ZX EXCISION OF ILEUM, VIA NATURAL OR ARTIFICIAL OPENING ENDOSCOPIC, DIAGNOSTIC: ICD-10-PCS | Performed by: COLON & RECTAL SURGERY

## 2023-05-23 PROCEDURE — G0500 MOD SEDAT ENDO SERVICE >5YRS: HCPCS | Performed by: COLON & RECTAL SURGERY

## 2023-05-23 RX ORDER — NALOXONE HYDROCHLORIDE 0.4 MG/ML
0.2 INJECTION, SOLUTION INTRAMUSCULAR; INTRAVENOUS; SUBCUTANEOUS
Status: DISCONTINUED | OUTPATIENT
Start: 2023-05-23 | End: 2023-05-23 | Stop reason: HOSPADM

## 2023-05-23 RX ORDER — ATROPINE SULFATE 0.1 MG/ML
1 INJECTION INTRAVENOUS
Status: DISCONTINUED | OUTPATIENT
Start: 2023-05-23 | End: 2023-05-23 | Stop reason: HOSPADM

## 2023-05-23 RX ORDER — EPINEPHRINE 1 MG/ML
0.1 INJECTION, SOLUTION INTRAMUSCULAR; SUBCUTANEOUS
Status: DISCONTINUED | OUTPATIENT
Start: 2023-05-23 | End: 2023-05-23 | Stop reason: HOSPADM

## 2023-05-23 RX ORDER — SIMETHICONE 40MG/0.6ML
133 SUSPENSION, DROPS(FINAL DOSAGE FORM)(ML) ORAL
Status: COMPLETED | OUTPATIENT
Start: 2023-05-23 | End: 2023-05-23

## 2023-05-23 RX ORDER — FENTANYL CITRATE 50 UG/ML
50-100 INJECTION, SOLUTION INTRAMUSCULAR; INTRAVENOUS EVERY 5 MIN PRN
Status: DISCONTINUED | OUTPATIENT
Start: 2023-05-23 | End: 2023-05-23 | Stop reason: HOSPADM

## 2023-05-23 RX ORDER — ONDANSETRON 2 MG/ML
4 INJECTION INTRAMUSCULAR; INTRAVENOUS
Status: DISCONTINUED | OUTPATIENT
Start: 2023-05-23 | End: 2023-05-23 | Stop reason: HOSPADM

## 2023-05-23 RX ORDER — PROCHLORPERAZINE MALEATE 5 MG
5 TABLET ORAL EVERY 6 HOURS PRN
Status: DISCONTINUED | OUTPATIENT
Start: 2023-05-23 | End: 2023-05-23 | Stop reason: HOSPADM

## 2023-05-23 RX ORDER — ONDANSETRON 2 MG/ML
4 INJECTION INTRAMUSCULAR; INTRAVENOUS EVERY 6 HOURS PRN
Status: DISCONTINUED | OUTPATIENT
Start: 2023-05-23 | End: 2023-05-23 | Stop reason: HOSPADM

## 2023-05-23 RX ORDER — NALOXONE HYDROCHLORIDE 0.4 MG/ML
0.4 INJECTION, SOLUTION INTRAMUSCULAR; INTRAVENOUS; SUBCUTANEOUS
Status: DISCONTINUED | OUTPATIENT
Start: 2023-05-23 | End: 2023-05-23 | Stop reason: HOSPADM

## 2023-05-23 RX ORDER — FLUMAZENIL 0.1 MG/ML
0.2 INJECTION, SOLUTION INTRAVENOUS
Status: DISCONTINUED | OUTPATIENT
Start: 2023-05-23 | End: 2023-05-23 | Stop reason: HOSPADM

## 2023-05-23 RX ORDER — LIDOCAINE 40 MG/G
CREAM TOPICAL
Status: DISCONTINUED | OUTPATIENT
Start: 2023-05-23 | End: 2023-05-23 | Stop reason: HOSPADM

## 2023-05-23 RX ORDER — ONDANSETRON 4 MG/1
4 TABLET, ORALLY DISINTEGRATING ORAL EVERY 6 HOURS PRN
Status: DISCONTINUED | OUTPATIENT
Start: 2023-05-23 | End: 2023-05-23 | Stop reason: HOSPADM

## 2023-05-23 RX ORDER — DIPHENHYDRAMINE HYDROCHLORIDE 50 MG/ML
25-50 INJECTION INTRAMUSCULAR; INTRAVENOUS
Status: DISCONTINUED | OUTPATIENT
Start: 2023-05-23 | End: 2023-05-23 | Stop reason: HOSPADM

## 2023-05-23 RX ADMIN — MIDAZOLAM 2 MG: 1 INJECTION INTRAMUSCULAR; INTRAVENOUS at 09:48

## 2023-05-23 RX ADMIN — SIMETHICONE 133 MG: 20 EMULSION ORAL at 10:15

## 2023-05-23 RX ADMIN — FENTANYL CITRATE 100 MCG: 50 INJECTION, SOLUTION INTRAMUSCULAR; INTRAVENOUS at 09:48

## 2023-05-23 ASSESSMENT — ACTIVITIES OF DAILY LIVING (ADL): ADLS_ACUITY_SCORE: 35

## 2023-05-23 NOTE — H&P (VIEW-ONLY)
Pre-Endoscopy History and Physical     Naila Talley MRN# [id number]   YOB: 1950 Age: 72 year old     Date of Procedure: 5/23/2023  Primary care provider: Danelle Adam  Type of Endoscopy: Colonoscopy  Reason for Procedure: Screening, h/o rectal injury during repair of rectal prolapse.  Type of Anesthesia Anticipated: Moderate Sedation    HPI:    Naila is a 72 year old female who will be undergoing the above procedure.      A history and physical has been performed. The patient's medications and allergies have been reviewed. The risks and benefits of the procedure and the sedation options and risks were discussed with the patient.  All questions were answered and informed consent was obtained.      She denies a personal or family history of anesthesia complications or bleeding disorders.     Allergies   Allergen Reactions     Ciprofloxacin Diarrhea     Fluticasone-Salmeterol      Pt states her hair fell out taking this medication         No current facility-administered medications on file prior to encounter.  albuterol (PROAIR HFA/PROVENTIL HFA/VENTOLIN HFA) 108 (90 Base) MCG/ACT inhaler, Inhale 2 puffs into the lungs every 6 hours as needed for shortness of breath / dyspnea or wheezing  aspirin 81 MG EC tablet, Take 81 mg by mouth At Bedtime  calcium citrate (CITRACAL) 950 (200 Ca) MG tablet, Take 1 tablet by mouth daily (with lunch)  cholecalciferol (VITAMIN D3) 25 mcg (1000 units) capsule, Take 1 capsule by mouth daily (with dinner)  fluticasone (FLONASE) 50 MCG/ACT nasal spray, Spray 1 spray into both nostrils daily  Multiple Vitamins-Minerals (OCUVITE PRESERVISION PO), Take 1 tablet by mouth daily (with dinner)  multivitamin, therapeutic (THERA-VIT) TABS tablet, Take 1 tablet by mouth daily  Omega-3-6-9 CAPS, Take 1 capsule by mouth daily  rosuvastatin (CRESTOR) 5 MG tablet, Take 5 mg by mouth At Bedtime  traZODone (DESYREL) 100 MG tablet, Take 100 mg by mouth At Bedtime        Patient  "Active Problem List   Diagnosis     Community acquired pneumonia, unspecified laterality     Acute respiratory failure with hypoxia (H)     Mild intermittent asthma with exacerbation     Essential hypertension     Hyperlipidemia LDL goal <100        Past Medical History:   Diagnosis Date     Uncomplicated asthma         Past Surgical History:   Procedure Laterality Date     RECTAL SURGERY  2015       Social History     Tobacco Use     Smoking status: Never     Smokeless tobacco: Never   Vaping Use     Vaping status: Not on file   Substance Use Topics     Alcohol use: Never       Family History   Problem Relation Age of Onset     Colon Cancer No family hx of        REVIEW OF SYSTEMS:     5 point ROS negative except as noted above in HPI, including Gen., Resp., CV, GI &  system review.      PHYSICAL EXAM:   Ht 1.651 m (5' 5\")   Wt 86.2 kg (190 lb)   BMI 31.62 kg/m   Estimated body mass index is 31.62 kg/m  as calculated from the following:    Height as of this encounter: 1.651 m (5' 5\").    Weight as of this encounter: 86.2 kg (190 lb).   GENERAL APPEARANCE: healthy and alert  MENTAL STATUS: alert  AIRWAY EXAM: Mallampatti Class I (visualization of the soft palate, fauces, uvula, anterior and posterior pillars)  RESP: lungs clear to auscultation - no rales, rhonchi or wheezes  CV: regular rates and rhythm      IMPRESSION   ASA Class 2 - Mild systemic disease        PLAN:     Plan for colonoscopy. We discussed the risks, benefits and alternatives and the patient wished to proceed.    The above has been forwarded to the consulting provider.      Carmella Trevizo MD  [facility]  Phone: [phone]  Fax: [phone]  May 23, 2023    "

## 2023-05-23 NOTE — H&P
Pre-Endoscopy History and Physical     Naila Talley MRN# [id number]   YOB: 1950 Age: 72 year old     Date of Procedure: 5/23/2023  Primary care provider: Danelle Adam  Type of Endoscopy: Colonoscopy  Reason for Procedure: Screening, h/o rectal injury during repair of rectal prolapse.  Type of Anesthesia Anticipated: Moderate Sedation    HPI:    Naila is a 72 year old female who will be undergoing the above procedure.      A history and physical has been performed. The patient's medications and allergies have been reviewed. The risks and benefits of the procedure and the sedation options and risks were discussed with the patient.  All questions were answered and informed consent was obtained.      She denies a personal or family history of anesthesia complications or bleeding disorders.     Allergies   Allergen Reactions     Ciprofloxacin Diarrhea     Fluticasone-Salmeterol      Pt states her hair fell out taking this medication         No current facility-administered medications on file prior to encounter.  albuterol (PROAIR HFA/PROVENTIL HFA/VENTOLIN HFA) 108 (90 Base) MCG/ACT inhaler, Inhale 2 puffs into the lungs every 6 hours as needed for shortness of breath / dyspnea or wheezing  aspirin 81 MG EC tablet, Take 81 mg by mouth At Bedtime  calcium citrate (CITRACAL) 950 (200 Ca) MG tablet, Take 1 tablet by mouth daily (with lunch)  cholecalciferol (VITAMIN D3) 25 mcg (1000 units) capsule, Take 1 capsule by mouth daily (with dinner)  fluticasone (FLONASE) 50 MCG/ACT nasal spray, Spray 1 spray into both nostrils daily  Multiple Vitamins-Minerals (OCUVITE PRESERVISION PO), Take 1 tablet by mouth daily (with dinner)  multivitamin, therapeutic (THERA-VIT) TABS tablet, Take 1 tablet by mouth daily  Omega-3-6-9 CAPS, Take 1 capsule by mouth daily  rosuvastatin (CRESTOR) 5 MG tablet, Take 5 mg by mouth At Bedtime  traZODone (DESYREL) 100 MG tablet, Take 100 mg by mouth At Bedtime        Patient  "Active Problem List   Diagnosis     Community acquired pneumonia, unspecified laterality     Acute respiratory failure with hypoxia (H)     Mild intermittent asthma with exacerbation     Essential hypertension     Hyperlipidemia LDL goal <100        Past Medical History:   Diagnosis Date     Uncomplicated asthma         Past Surgical History:   Procedure Laterality Date     RECTAL SURGERY  2015       Social History     Tobacco Use     Smoking status: Never     Smokeless tobacco: Never   Vaping Use     Vaping status: Not on file   Substance Use Topics     Alcohol use: Never       Family History   Problem Relation Age of Onset     Colon Cancer No family hx of        REVIEW OF SYSTEMS:     5 point ROS negative except as noted above in HPI, including Gen., Resp., CV, GI &  system review.      PHYSICAL EXAM:   Ht 1.651 m (5' 5\")   Wt 86.2 kg (190 lb)   BMI 31.62 kg/m   Estimated body mass index is 31.62 kg/m  as calculated from the following:    Height as of this encounter: 1.651 m (5' 5\").    Weight as of this encounter: 86.2 kg (190 lb).   GENERAL APPEARANCE: healthy and alert  MENTAL STATUS: alert  AIRWAY EXAM: Mallampatti Class I (visualization of the soft palate, fauces, uvula, anterior and posterior pillars)  RESP: lungs clear to auscultation - no rales, rhonchi or wheezes  CV: regular rates and rhythm      IMPRESSION   ASA Class 2 - Mild systemic disease        PLAN:     Plan for colonoscopy. We discussed the risks, benefits and alternatives and the patient wished to proceed.    The above has been forwarded to the consulting provider.      Carmella Trevizo MD  [facility]  Phone: [phone]  Fax: [phone]  May 23, 2023    "

## 2023-05-24 ENCOUNTER — HOSPITAL ENCOUNTER (INPATIENT)
Facility: CLINIC | Age: 73
LOS: 1 days | Discharge: HOME | DRG: 743 | End: 2023-05-25
Attending: OBSTETRICS & GYNECOLOGY | Admitting: UROLOGY
Payer: MEDICARE

## 2023-05-24 ENCOUNTER — ANESTHESIA (OUTPATIENT)
Dept: SURGERY | Facility: CLINIC | Age: 73
DRG: 743 | End: 2023-05-24
Payer: MEDICARE

## 2023-05-24 DIAGNOSIS — N81.10 PELVIC ORGAN PROLAPSE QUANTIFICATION STAGE 3 CYSTOCELE: Primary | ICD-10-CM

## 2023-05-24 PROBLEM — K62.3 RECTAL PROLAPSE: Status: ACTIVE | Noted: 2023-05-24

## 2023-05-24 LAB
ABO/RH(D): NORMAL
ANTIBODY SCREEN: NEGATIVE
CREAT SERPL-MCNC: 0.83 MG/DL (ref 0.51–0.95)
GFR SERPL CREATININE-BSD FRML MDRD: 74 ML/MIN/1.73M2
GLUCOSE SERPL-MCNC: 115 MG/DL (ref 70–99)
PATH REPORT.COMMENTS IMP SPEC: NORMAL
PATH REPORT.COMMENTS IMP SPEC: NORMAL
PATH REPORT.FINAL DX SPEC: NORMAL
PATH REPORT.GROSS SPEC: NORMAL
PATH REPORT.MICROSCOPIC SPEC OTHER STN: NORMAL
PATH REPORT.RELEVANT HX SPEC: NORMAL
PHOTO IMAGE: NORMAL
POTASSIUM SERPL-SCNC: 3.6 MMOL/L (ref 3.4–5.3)
SPECIMEN EXPIRATION DATE: NORMAL

## 2023-05-24 PROCEDURE — 250N000011 HC RX IP 250 OP 636: Performed by: NURSE ANESTHETIST, CERTIFIED REGISTERED

## 2023-05-24 PROCEDURE — 250N000009 HC RX 250: Performed by: OBSTETRICS & GYNECOLOGY

## 2023-05-24 PROCEDURE — 250N000011 HC RX IP 250 OP 636: Performed by: COLON & RECTAL SURGERY

## 2023-05-24 PROCEDURE — 0USG4ZZ REPOSITION VAGINA, PERCUTANEOUS ENDOSCOPIC APPROACH: ICD-10-PCS | Performed by: UROLOGY

## 2023-05-24 PROCEDURE — 88305 TISSUE EXAM BY PATHOLOGIST: CPT | Mod: 26 | Performed by: PATHOLOGY

## 2023-05-24 PROCEDURE — C1771 REP DEV, URINARY, W/SLING: HCPCS | Performed by: OBSTETRICS & GYNECOLOGY

## 2023-05-24 PROCEDURE — 0DUP4JZ SUPPLEMENT RECTUM WITH SYNTHETIC SUBSTITUTE, PERCUTANEOUS ENDOSCOPIC APPROACH: ICD-10-PCS | Performed by: COLON & RECTAL SURGERY

## 2023-05-24 PROCEDURE — 360N000080 HC SURGERY LEVEL 7, PER MIN: Performed by: OBSTETRICS & GYNECOLOGY

## 2023-05-24 PROCEDURE — 258N000003 HC RX IP 258 OP 636: Performed by: NURSE ANESTHETIST, CERTIFIED REGISTERED

## 2023-05-24 PROCEDURE — 0UT74ZZ RESECTION OF BILATERAL FALLOPIAN TUBES, PERCUTANEOUS ENDOSCOPIC APPROACH: ICD-10-PCS | Performed by: OBSTETRICS & GYNECOLOGY

## 2023-05-24 PROCEDURE — 250N000013 HC RX MED GY IP 250 OP 250 PS 637: Performed by: COLON & RECTAL SURGERY

## 2023-05-24 PROCEDURE — 0UT94ZL RESECTION OF UTERUS, SUPRACERVICAL, PERCUTANEOUS ENDOSCOPIC APPROACH: ICD-10-PCS | Performed by: OBSTETRICS & GYNECOLOGY

## 2023-05-24 PROCEDURE — 88305 TISSUE EXAM BY PATHOLOGIST: CPT | Mod: TC | Performed by: OBSTETRICS & GYNECOLOGY

## 2023-05-24 PROCEDURE — 258N000003 HC RX IP 258 OP 636: Performed by: COLON & RECTAL SURGERY

## 2023-05-24 PROCEDURE — 86850 RBC ANTIBODY SCREEN: CPT | Performed by: PHYSICIAN ASSISTANT

## 2023-05-24 PROCEDURE — 0TSD4ZZ REPOSITION URETHRA, PERCUTANEOUS ENDOSCOPIC APPROACH: ICD-10-PCS | Performed by: UROLOGY

## 2023-05-24 PROCEDURE — 0DSP4ZZ REPOSITION RECTUM, PERCUTANEOUS ENDOSCOPIC APPROACH: ICD-10-PCS | Performed by: COLON & RECTAL SURGERY

## 2023-05-24 PROCEDURE — 84132 ASSAY OF SERUM POTASSIUM: CPT | Performed by: ANESTHESIOLOGY

## 2023-05-24 PROCEDURE — P9045 ALBUMIN (HUMAN), 5%, 250 ML: HCPCS | Performed by: NURSE ANESTHETIST, CERTIFIED REGISTERED

## 2023-05-24 PROCEDURE — 36415 COLL VENOUS BLD VENIPUNCTURE: CPT | Performed by: ANESTHESIOLOGY

## 2023-05-24 PROCEDURE — 82947 ASSAY GLUCOSE BLOOD QUANT: CPT | Performed by: ANESTHESIOLOGY

## 2023-05-24 PROCEDURE — 272N000001 HC OR GENERAL SUPPLY STERILE: Performed by: OBSTETRICS & GYNECOLOGY

## 2023-05-24 PROCEDURE — 250N000009 HC RX 250: Performed by: NURSE ANESTHETIST, CERTIFIED REGISTERED

## 2023-05-24 PROCEDURE — 120N000001 HC R&B MED SURG/OB

## 2023-05-24 PROCEDURE — 0UT24ZZ RESECTION OF BILATERAL OVARIES, PERCUTANEOUS ENDOSCOPIC APPROACH: ICD-10-PCS | Performed by: OBSTETRICS & GYNECOLOGY

## 2023-05-24 PROCEDURE — 710N000009 HC RECOVERY PHASE 1, LEVEL 1, PER MIN: Performed by: OBSTETRICS & GYNECOLOGY

## 2023-05-24 PROCEDURE — 82565 ASSAY OF CREATININE: CPT | Performed by: ANESTHESIOLOGY

## 2023-05-24 PROCEDURE — 250N000025 HC SEVOFLURANE, PER MIN: Performed by: OBSTETRICS & GYNECOLOGY

## 2023-05-24 PROCEDURE — C1781 MESH (IMPLANTABLE): HCPCS | Performed by: OBSTETRICS & GYNECOLOGY

## 2023-05-24 PROCEDURE — 999N000141 HC STATISTIC PRE-PROCEDURE NURSING ASSESSMENT: Performed by: OBSTETRICS & GYNECOLOGY

## 2023-05-24 PROCEDURE — 370N000017 HC ANESTHESIA TECHNICAL FEE, PER MIN: Performed by: OBSTETRICS & GYNECOLOGY

## 2023-05-24 PROCEDURE — 258N000003 HC RX IP 258 OP 636: Performed by: ANESTHESIOLOGY

## 2023-05-24 PROCEDURE — 8E0W4CZ ROBOTIC ASSISTED PROCEDURE OF TRUNK REGION, PERCUTANEOUS ENDOSCOPIC APPROACH: ICD-10-PCS | Performed by: OBSTETRICS & GYNECOLOGY

## 2023-05-24 PROCEDURE — 0TJ98ZZ INSPECTION OF URETER, VIA NATURAL OR ARTIFICIAL OPENING ENDOSCOPIC: ICD-10-PCS | Performed by: UROLOGY

## 2023-05-24 PROCEDURE — 999N000040 HC STATISTIC CONSULT NO CHARGE VASC ACCESS

## 2023-05-24 PROCEDURE — 0UUG4JZ SUPPLEMENT VAGINA WITH SYNTHETIC SUBSTITUTE, PERCUTANEOUS ENDOSCOPIC APPROACH: ICD-10-PCS | Performed by: UROLOGY

## 2023-05-24 PROCEDURE — 250N000013 HC RX MED GY IP 250 OP 250 PS 637: Performed by: PHYSICIAN ASSISTANT

## 2023-05-24 DEVICE — MESH SLING Y SHAPE RESTORELLE 24X4CM 501420: Type: IMPLANTABLE DEVICE | Site: PELVIS | Status: FUNCTIONAL

## 2023-05-24 DEVICE — MESH SLING OBTRYX-HALO M0068505000: Type: IMPLANTABLE DEVICE | Site: PELVIS | Status: FUNCTIONAL

## 2023-05-24 DEVICE — MESH SLING PROLAPSE POLYFORM SYNTH 10X15CM M0068402580: Type: IMPLANTABLE DEVICE | Site: PELVIS | Status: FUNCTIONAL

## 2023-05-24 RX ORDER — ONDANSETRON 4 MG/1
4 TABLET, ORALLY DISINTEGRATING ORAL EVERY 30 MIN PRN
Status: DISCONTINUED | OUTPATIENT
Start: 2023-05-24 | End: 2023-05-24 | Stop reason: HOSPADM

## 2023-05-24 RX ORDER — NALOXONE HYDROCHLORIDE 0.4 MG/ML
0.2 INJECTION, SOLUTION INTRAMUSCULAR; INTRAVENOUS; SUBCUTANEOUS
Status: DISCONTINUED | OUTPATIENT
Start: 2023-05-24 | End: 2023-05-25 | Stop reason: HOSPADM

## 2023-05-24 RX ORDER — PROPOFOL 10 MG/ML
INJECTION, EMULSION INTRAVENOUS PRN
Status: DISCONTINUED | OUTPATIENT
Start: 2023-05-24 | End: 2023-05-24

## 2023-05-24 RX ORDER — HYDROMORPHONE HCL IN WATER/PF 6 MG/30 ML
0.4 PATIENT CONTROLLED ANALGESIA SYRINGE INTRAVENOUS EVERY 5 MIN PRN
Status: DISCONTINUED | OUTPATIENT
Start: 2023-05-24 | End: 2023-05-24 | Stop reason: HOSPADM

## 2023-05-24 RX ORDER — ACETAMINOPHEN 325 MG/1
975 TABLET ORAL ONCE
Status: DISCONTINUED | OUTPATIENT
Start: 2023-05-24 | End: 2023-05-24

## 2023-05-24 RX ORDER — HYDROMORPHONE HCL IN WATER/PF 6 MG/30 ML
0.2 PATIENT CONTROLLED ANALGESIA SYRINGE INTRAVENOUS
Status: DISCONTINUED | OUTPATIENT
Start: 2023-05-24 | End: 2023-05-25 | Stop reason: HOSPADM

## 2023-05-24 RX ORDER — CEFAZOLIN SODIUM 1 G/3ML
1 INJECTION, POWDER, FOR SOLUTION INTRAMUSCULAR; INTRAVENOUS EVERY 8 HOURS
Status: COMPLETED | OUTPATIENT
Start: 2023-05-24 | End: 2023-05-25

## 2023-05-24 RX ORDER — CEFAZOLIN SODIUM/WATER 2 G/20 ML
2 SYRINGE (ML) INTRAVENOUS SEE ADMIN INSTRUCTIONS
Status: DISCONTINUED | OUTPATIENT
Start: 2023-05-24 | End: 2023-05-24

## 2023-05-24 RX ORDER — DEXAMETHASONE SODIUM PHOSPHATE 4 MG/ML
INJECTION, SOLUTION INTRA-ARTICULAR; INTRALESIONAL; INTRAMUSCULAR; INTRAVENOUS; SOFT TISSUE PRN
Status: DISCONTINUED | OUTPATIENT
Start: 2023-05-24 | End: 2023-05-24

## 2023-05-24 RX ORDER — PROPOFOL 10 MG/ML
INJECTION, EMULSION INTRAVENOUS CONTINUOUS PRN
Status: DISCONTINUED | OUTPATIENT
Start: 2023-05-24 | End: 2023-05-24

## 2023-05-24 RX ORDER — ONDANSETRON 2 MG/ML
4 INJECTION INTRAMUSCULAR; INTRAVENOUS EVERY 6 HOURS PRN
Status: DISCONTINUED | OUTPATIENT
Start: 2023-05-24 | End: 2023-05-25 | Stop reason: HOSPADM

## 2023-05-24 RX ORDER — METRONIDAZOLE 500 MG/100ML
500 INJECTION, SOLUTION INTRAVENOUS
Status: COMPLETED | OUTPATIENT
Start: 2023-05-24 | End: 2023-05-24

## 2023-05-24 RX ORDER — FENTANYL CITRATE 50 UG/ML
INJECTION, SOLUTION INTRAMUSCULAR; INTRAVENOUS PRN
Status: DISCONTINUED | OUTPATIENT
Start: 2023-05-24 | End: 2023-05-24

## 2023-05-24 RX ORDER — SODIUM CHLORIDE, SODIUM LACTATE, POTASSIUM CHLORIDE, CALCIUM CHLORIDE 600; 310; 30; 20 MG/100ML; MG/100ML; MG/100ML; MG/100ML
INJECTION, SOLUTION INTRAVENOUS CONTINUOUS
Status: DISCONTINUED | OUTPATIENT
Start: 2023-05-24 | End: 2023-05-24 | Stop reason: HOSPADM

## 2023-05-24 RX ORDER — GLYCOPYRROLATE 0.2 MG/ML
INJECTION, SOLUTION INTRAMUSCULAR; INTRAVENOUS PRN
Status: DISCONTINUED | OUTPATIENT
Start: 2023-05-24 | End: 2023-05-24

## 2023-05-24 RX ORDER — LIDOCAINE HYDROCHLORIDE 20 MG/ML
INJECTION, SOLUTION INFILTRATION; PERINEURAL PRN
Status: DISCONTINUED | OUTPATIENT
Start: 2023-05-24 | End: 2023-05-24

## 2023-05-24 RX ORDER — BUPIVACAINE HYDROCHLORIDE AND EPINEPHRINE 2.5; 5 MG/ML; UG/ML
INJECTION, SOLUTION EPIDURAL; INFILTRATION; INTRACAUDAL; PERINEURAL PRN
Status: DISCONTINUED | OUTPATIENT
Start: 2023-05-24 | End: 2023-05-24 | Stop reason: HOSPADM

## 2023-05-24 RX ORDER — METRONIDAZOLE 500 MG/100ML
500 INJECTION, SOLUTION INTRAVENOUS EVERY 12 HOURS
Status: COMPLETED | OUTPATIENT
Start: 2023-05-24 | End: 2023-05-25

## 2023-05-24 RX ORDER — ACETAMINOPHEN 325 MG/1
650 TABLET ORAL EVERY 4 HOURS PRN
Status: DISCONTINUED | OUTPATIENT
Start: 2023-05-27 | End: 2023-05-25 | Stop reason: HOSPADM

## 2023-05-24 RX ORDER — SODIUM CHLORIDE, SODIUM LACTATE, POTASSIUM CHLORIDE, CALCIUM CHLORIDE 600; 310; 30; 20 MG/100ML; MG/100ML; MG/100ML; MG/100ML
INJECTION, SOLUTION INTRAVENOUS CONTINUOUS
Status: DISCONTINUED | OUTPATIENT
Start: 2023-05-24 | End: 2023-05-25 | Stop reason: HOSPADM

## 2023-05-24 RX ORDER — LIDOCAINE 40 MG/G
CREAM TOPICAL
Status: DISCONTINUED | OUTPATIENT
Start: 2023-05-24 | End: 2023-05-24

## 2023-05-24 RX ORDER — LABETALOL HYDROCHLORIDE 5 MG/ML
5 INJECTION, SOLUTION INTRAVENOUS
Status: DISCONTINUED | OUTPATIENT
Start: 2023-05-24 | End: 2023-05-24 | Stop reason: HOSPADM

## 2023-05-24 RX ORDER — ACETAMINOPHEN 325 MG/1
975 TABLET ORAL ONCE
Status: COMPLETED | OUTPATIENT
Start: 2023-05-24 | End: 2023-05-24

## 2023-05-24 RX ORDER — ALBUTEROL SULFATE 90 UG/1
2 AEROSOL, METERED RESPIRATORY (INHALATION) EVERY 6 HOURS PRN
Status: DISCONTINUED | OUTPATIENT
Start: 2023-05-24 | End: 2023-05-25 | Stop reason: HOSPADM

## 2023-05-24 RX ORDER — FENTANYL CITRATE 0.05 MG/ML
25 INJECTION, SOLUTION INTRAMUSCULAR; INTRAVENOUS EVERY 5 MIN PRN
Status: DISCONTINUED | OUTPATIENT
Start: 2023-05-24 | End: 2023-05-24 | Stop reason: HOSPADM

## 2023-05-24 RX ORDER — ESTRADIOL 0.1 MG/G
CREAM VAGINAL
Qty: 42 G | Refills: 0 | Status: SHIPPED | OUTPATIENT
Start: 2023-05-25

## 2023-05-24 RX ORDER — CEFOXITIN 1 G/1
1 INJECTION, POWDER, FOR SOLUTION INTRAVENOUS EVERY 8 HOURS
Status: DISCONTINUED | OUTPATIENT
Start: 2023-05-24 | End: 2023-05-24 | Stop reason: ALTCHOICE

## 2023-05-24 RX ORDER — OXYCODONE HYDROCHLORIDE 5 MG/1
5 TABLET ORAL EVERY 4 HOURS PRN
Status: DISCONTINUED | OUTPATIENT
Start: 2023-05-24 | End: 2023-05-25 | Stop reason: HOSPADM

## 2023-05-24 RX ORDER — NALOXONE HYDROCHLORIDE 0.4 MG/ML
0.4 INJECTION, SOLUTION INTRAMUSCULAR; INTRAVENOUS; SUBCUTANEOUS
Status: DISCONTINUED | OUTPATIENT
Start: 2023-05-24 | End: 2023-05-25 | Stop reason: HOSPADM

## 2023-05-24 RX ORDER — LIDOCAINE 40 MG/G
CREAM TOPICAL
Status: DISCONTINUED | OUTPATIENT
Start: 2023-05-24 | End: 2023-05-25 | Stop reason: HOSPADM

## 2023-05-24 RX ORDER — ONDANSETRON 2 MG/ML
4 INJECTION INTRAMUSCULAR; INTRAVENOUS ONCE
Status: COMPLETED | OUTPATIENT
Start: 2023-05-24 | End: 2023-05-24

## 2023-05-24 RX ORDER — HYDROMORPHONE HCL IN WATER/PF 6 MG/30 ML
0.2 PATIENT CONTROLLED ANALGESIA SYRINGE INTRAVENOUS EVERY 5 MIN PRN
Status: DISCONTINUED | OUTPATIENT
Start: 2023-05-24 | End: 2023-05-24 | Stop reason: HOSPADM

## 2023-05-24 RX ORDER — SODIUM CHLORIDE, SODIUM LACTATE, POTASSIUM CHLORIDE, CALCIUM CHLORIDE 600; 310; 30; 20 MG/100ML; MG/100ML; MG/100ML; MG/100ML
INJECTION, SOLUTION INTRAVENOUS CONTINUOUS
Status: DISCONTINUED | OUTPATIENT
Start: 2023-05-24 | End: 2023-05-24

## 2023-05-24 RX ORDER — CEFOXITIN 2 G/1
2 INJECTION, POWDER, FOR SOLUTION INTRAVENOUS ONCE
Status: DISCONTINUED | OUTPATIENT
Start: 2023-05-24 | End: 2023-05-24

## 2023-05-24 RX ORDER — ACETAMINOPHEN 325 MG/1
975 TABLET ORAL EVERY 8 HOURS
Status: DISCONTINUED | OUTPATIENT
Start: 2023-05-24 | End: 2023-05-25 | Stop reason: HOSPADM

## 2023-05-24 RX ORDER — CEFAZOLIN SODIUM/WATER 2 G/20 ML
2 SYRINGE (ML) INTRAVENOUS
Status: COMPLETED | OUTPATIENT
Start: 2023-05-24 | End: 2023-05-24

## 2023-05-24 RX ORDER — ONDANSETRON 2 MG/ML
INJECTION INTRAMUSCULAR; INTRAVENOUS PRN
Status: DISCONTINUED | OUTPATIENT
Start: 2023-05-24 | End: 2023-05-24

## 2023-05-24 RX ORDER — ONDANSETRON 2 MG/ML
4 INJECTION INTRAMUSCULAR; INTRAVENOUS EVERY 30 MIN PRN
Status: DISCONTINUED | OUTPATIENT
Start: 2023-05-24 | End: 2023-05-24 | Stop reason: HOSPADM

## 2023-05-24 RX ORDER — FENTANYL CITRATE 0.05 MG/ML
50 INJECTION, SOLUTION INTRAMUSCULAR; INTRAVENOUS EVERY 5 MIN PRN
Status: DISCONTINUED | OUTPATIENT
Start: 2023-05-24 | End: 2023-05-24 | Stop reason: HOSPADM

## 2023-05-24 RX ORDER — HYDRALAZINE HYDROCHLORIDE 20 MG/ML
2.5-5 INJECTION INTRAMUSCULAR; INTRAVENOUS
Status: DISCONTINUED | OUTPATIENT
Start: 2023-05-24 | End: 2023-05-24 | Stop reason: HOSPADM

## 2023-05-24 RX ORDER — PHENAZOPYRIDINE HYDROCHLORIDE 200 MG/1
200 TABLET, FILM COATED ORAL ONCE
Status: COMPLETED | OUTPATIENT
Start: 2023-05-24 | End: 2023-05-24

## 2023-05-24 RX ORDER — OXYCODONE HYDROCHLORIDE 5 MG/1
10 TABLET ORAL EVERY 4 HOURS PRN
Status: DISCONTINUED | OUTPATIENT
Start: 2023-05-24 | End: 2023-05-25 | Stop reason: HOSPADM

## 2023-05-24 RX ORDER — NEOSTIGMINE METHYLSULFATE 1 MG/ML
VIAL (ML) INJECTION PRN
Status: DISCONTINUED | OUTPATIENT
Start: 2023-05-24 | End: 2023-05-24

## 2023-05-24 RX ORDER — HYDROMORPHONE HCL IN WATER/PF 6 MG/30 ML
0.4 PATIENT CONTROLLED ANALGESIA SYRINGE INTRAVENOUS
Status: DISCONTINUED | OUTPATIENT
Start: 2023-05-24 | End: 2023-05-25 | Stop reason: HOSPADM

## 2023-05-24 RX ORDER — MEPERIDINE HYDROCHLORIDE 25 MG/ML
12.5 INJECTION INTRAMUSCULAR; INTRAVENOUS; SUBCUTANEOUS EVERY 5 MIN PRN
Status: DISCONTINUED | OUTPATIENT
Start: 2023-05-24 | End: 2023-05-24 | Stop reason: HOSPADM

## 2023-05-24 RX ORDER — ONDANSETRON 4 MG/1
4 TABLET, ORALLY DISINTEGRATING ORAL EVERY 6 HOURS PRN
Status: DISCONTINUED | OUTPATIENT
Start: 2023-05-24 | End: 2023-05-25 | Stop reason: HOSPADM

## 2023-05-24 RX ADMIN — METRONIDAZOLE 500 MG: 500 INJECTION, SOLUTION INTRAVENOUS at 19:01

## 2023-05-24 RX ADMIN — NEOSTIGMINE METHYLSULFATE 2 MG: 1 INJECTION, SOLUTION INTRAVENOUS at 12:16

## 2023-05-24 RX ADMIN — PHENYLEPHRINE HYDROCHLORIDE 50 MCG: 10 INJECTION INTRAVENOUS at 08:20

## 2023-05-24 RX ADMIN — ROCURONIUM BROMIDE 10 MG: 50 INJECTION, SOLUTION INTRAVENOUS at 11:32

## 2023-05-24 RX ADMIN — PHENYLEPHRINE HYDROCHLORIDE 0.3 MCG/KG/MIN: 10 INJECTION INTRAVENOUS at 08:02

## 2023-05-24 RX ADMIN — PHENYLEPHRINE HYDROCHLORIDE 100 MCG: 10 INJECTION INTRAVENOUS at 08:18

## 2023-05-24 RX ADMIN — SODIUM CHLORIDE, POTASSIUM CHLORIDE, SODIUM LACTATE AND CALCIUM CHLORIDE: 600; 310; 30; 20 INJECTION, SOLUTION INTRAVENOUS at 23:28

## 2023-05-24 RX ADMIN — ONDANSETRON 4 MG: 2 INJECTION INTRAMUSCULAR; INTRAVENOUS at 07:07

## 2023-05-24 RX ADMIN — OXYCODONE HYDROCHLORIDE 5 MG: 5 TABLET ORAL at 19:08

## 2023-05-24 RX ADMIN — GLYCOPYRROLATE 0.4 MG: 0.2 INJECTION, SOLUTION INTRAMUSCULAR; INTRAVENOUS at 12:16

## 2023-05-24 RX ADMIN — ONDANSETRON 4 MG: 2 INJECTION INTRAMUSCULAR; INTRAVENOUS at 11:59

## 2023-05-24 RX ADMIN — METRONIDAZOLE 500 MG: 500 INJECTION, SOLUTION INTRAVENOUS at 07:00

## 2023-05-24 RX ADMIN — ALBUMIN HUMAN: 0.05 INJECTION, SOLUTION INTRAVENOUS at 08:42

## 2023-05-24 RX ADMIN — SODIUM CHLORIDE, POTASSIUM CHLORIDE, SODIUM LACTATE AND CALCIUM CHLORIDE: 600; 310; 30; 20 INJECTION, SOLUTION INTRAVENOUS at 17:23

## 2023-05-24 RX ADMIN — ROCURONIUM BROMIDE 20 MG: 50 INJECTION, SOLUTION INTRAVENOUS at 09:15

## 2023-05-24 RX ADMIN — ACETAMINOPHEN 975 MG: 325 TABLET ORAL at 17:24

## 2023-05-24 RX ADMIN — Medication 2 G: at 12:10

## 2023-05-24 RX ADMIN — DEXAMETHASONE SODIUM PHOSPHATE 4 MG: 4 INJECTION, SOLUTION INTRA-ARTICULAR; INTRALESIONAL; INTRAMUSCULAR; INTRAVENOUS; SOFT TISSUE at 07:53

## 2023-05-24 RX ADMIN — MIDAZOLAM 1.5 MG: 1 INJECTION INTRAMUSCULAR; INTRAVENOUS at 07:49

## 2023-05-24 RX ADMIN — PROPOFOL 30 MCG/KG/MIN: 10 INJECTION, EMULSION INTRAVENOUS at 09:49

## 2023-05-24 RX ADMIN — CEFAZOLIN 1 G: 1 INJECTION, POWDER, FOR SOLUTION INTRAMUSCULAR; INTRAVENOUS at 20:57

## 2023-05-24 RX ADMIN — SODIUM CHLORIDE, POTASSIUM CHLORIDE, SODIUM LACTATE AND CALCIUM CHLORIDE: 600; 310; 30; 20 INJECTION, SOLUTION INTRAVENOUS at 07:06

## 2023-05-24 RX ADMIN — FENTANYL CITRATE 50 MCG: 50 INJECTION, SOLUTION INTRAMUSCULAR; INTRAVENOUS at 11:00

## 2023-05-24 RX ADMIN — SODIUM CHLORIDE, POTASSIUM CHLORIDE, SODIUM LACTATE AND CALCIUM CHLORIDE: 600; 310; 30; 20 INJECTION, SOLUTION INTRAVENOUS at 10:47

## 2023-05-24 RX ADMIN — PHENYLEPHRINE HYDROCHLORIDE 0.3 MCG/KG/MIN: 10 INJECTION INTRAVENOUS at 10:17

## 2023-05-24 RX ADMIN — ROCURONIUM BROMIDE 20 MG: 50 INJECTION, SOLUTION INTRAVENOUS at 10:28

## 2023-05-24 RX ADMIN — GLYCOPYRROLATE 0.4 MG: 0.2 INJECTION, SOLUTION INTRAMUSCULAR; INTRAVENOUS at 12:13

## 2023-05-24 RX ADMIN — PHENAZOPYRIDINE 200 MG: 200 TABLET ORAL at 07:05

## 2023-05-24 RX ADMIN — ROCURONIUM BROMIDE 50 MG: 50 INJECTION, SOLUTION INTRAVENOUS at 07:53

## 2023-05-24 RX ADMIN — FENTANYL CITRATE 50 MCG: 50 INJECTION, SOLUTION INTRAMUSCULAR; INTRAVENOUS at 07:53

## 2023-05-24 RX ADMIN — PROPOFOL 200 MG: 10 INJECTION, EMULSION INTRAVENOUS at 07:53

## 2023-05-24 RX ADMIN — PHENYLEPHRINE HYDROCHLORIDE 100 MCG: 10 INJECTION INTRAVENOUS at 07:54

## 2023-05-24 RX ADMIN — PROPOFOL 30 MCG/KG/MIN: 10 INJECTION, EMULSION INTRAVENOUS at 08:00

## 2023-05-24 RX ADMIN — ACETAMINOPHEN 975 MG: 325 TABLET ORAL at 07:05

## 2023-05-24 RX ADMIN — HYDROMORPHONE HYDROCHLORIDE 0.5 MG: 1 INJECTION, SOLUTION INTRAMUSCULAR; INTRAVENOUS; SUBCUTANEOUS at 08:32

## 2023-05-24 RX ADMIN — NEOSTIGMINE METHYLSULFATE 2 MG: 1 INJECTION, SOLUTION INTRAVENOUS at 12:13

## 2023-05-24 RX ADMIN — LIDOCAINE HYDROCHLORIDE 40 MG: 20 INJECTION, SOLUTION INFILTRATION; PERINEURAL at 07:53

## 2023-05-24 RX ADMIN — Medication 2 G: at 08:10

## 2023-05-24 ASSESSMENT — ACTIVITIES OF DAILY LIVING (ADL)
ADLS_ACUITY_SCORE: 20
ADLS_ACUITY_SCORE: 35
ADLS_ACUITY_SCORE: 20

## 2023-05-24 NOTE — BRIEF OP NOTE
[facility]    Brief Operative Note    Pre-operative diagnosis: Prolapse of female genital organs [N81.9]  Rectal prolapse [K62.3]  Post-operative diagnosis  same    Procedure: Procedure(s):  Robotic assisted supracervical hysterectomy, bilateral salpingo oophorectomy  Robotic ventral rectopexy with mesh and suture rectopexy  Robotic assisted Sacrocolpopexy, Midurethral bladder Sling, Cystoscopy  Surgeon: Surgeon(s) and Role:  Panel 1:     * Nilo Oconnor MD - Primary  Panel 2:     * Caro Trevizo MD - Primary     * Radha Ramírez PA-C - Assisting     * Loly Retana MD - Assisting  Panel 3:     * Armani Cronin MD - Primary     * David Bill PA-C - Assisting  Anesthesia: General   Estimated Blood Loss: Less than 10 ml    Drains: None  Specimens:   ID Type Source Tests Collected by Time Destination   1 : Uterus, bilateral fallopian tubes and ovaries Tissue Uterus, Bilateral Fallopian Tubes & Ovaries SURGICAL PATHOLOGY EXAM Nilo Oconnor MD 5/24/2023  9:47 AM      Findings:   None.  Complications: None.  Implants:   Implant Name Type Inv. Item Serial No.  Lot No. LRB No. Used Action   MESH SLING PROLAPSE POLYFORM SYNTH [id number] - [id number] Mesh MESH SLING PROLAPSE POLYFORM SYNTH [id number]  [employer] [id number] N/A 1 Implanted   MESH SLING Y SHAPE RESTORELLE [id number] - [id number] Mesh MESH SLING Y SHAPE RESTORELLE [id number]  [employer] [id number] N/A 1 Implanted   MESH SLING OBTRYX-HALO [id number] - [id number] Mesh MESH SLING OBTRYX-HALO [id number]  [employer] [id number] N/A 1 Implanted       Condition on discharge from OR: Satisfactory    RADHA RAMÍREZ PA-C   [facility]   [phone].        ADDENDUM:    PATIENT DATA  Indicate Y or N:  Home O2 No  Hemodialysis  No  Transplant patient  No  Cirrhosis  No  Steroids in last 30 days  No  Immunomodulators in last 30  days  No  Anticoagulation at time of surgery  No   List medication n  Prior abdominal surgery  Yes  Pelvic irradiation  No    Albumin within 30 days if known   Lab Results   Component Value Date    ALBUMIN 4.4 11/07/2022    ALBUMIN 3.9 10/04/2021        Hgb within 30 days if known    Hemoglobin   Date Value Ref Range Status   05/01/2023 12.8 11.7 - 15.7 g/dL Final   ]  Cr within 30 days if known    Creatinine   Date Value Ref Range Status   05/24/2023 0.83 0.51 - 0.95 mg/dL Final   ]  Body mass index is 31.45 kg/m .      OR DATA  Emergent  No   <24 hours  No   <1 week  No  Bowel Prep Yes  Antibiotics  Yes  DVT prophylaxis    Heparin  No   SCD  Yes   None  No  Drain  No  ASA (1,2,3,4) 2  OR time (min) 225  Stents  No  Transfuse >/= 2U  No  Anastomosis   Stapled  No   Handsewn  No  Leak Test    Positive  No   Negative  No   Not done  Yes

## 2023-05-24 NOTE — ANESTHESIA CARE TRANSFER NOTE
Patient: Naila Talley    Procedure: Procedure(s):  Robotic assisted supracervical hysterectomy, bilateral salpingo oophorectomy  Robotic ventral rectopexy with mesh and suture rectopexy  Robotic assisted Sacrocolpopexy, Midurethral bladder Sling, Cystoscopy       Diagnosis: Prolapse of female genital organs [N81.9]  Rectal prolapse [K62.3]  Diagnosis Additional Information: No value filed.    Anesthesia Type:   No value filed.     Note:    Oropharynx: oropharynx clear of all foreign objects and spontaneously breathing  Level of Consciousness: awake  Oxygen Supplementation: face mask  Level of Supplemental Oxygen (L/min / FiO2): 8  Independent Airway: airway patency satisfactory and stable  Dentition: dentition unchanged  Vital Signs Stable: post-procedure vital signs reviewed and stable  Report to RN Given: handoff report given  Patient transferred to: PACU  Comments: Suctioned, spont resp ,lifts head  > 5 sec. Extubated with immediate exchange, to PACU, report to RN.  Handoff Report: Identifed the Patient, Identified the Reponsible Provider, Reviewed the pertinent medical history, Discussed the surgical course, Reviewed Intra-OP anesthesia mangement and issues during anesthesia, Set expectations for post-procedure period and Allowed opportunity for questions and acknowledgement of understanding      Vitals:  Vitals Value Taken Time   /77 05/24/23 1300   Temp     Pulse 87 05/24/23 1303   Resp 18 05/24/23 1303   SpO2 100 % 05/24/23 1303   Vitals shown include unvalidated device data.    Electronically Signed By: YASMINE Burciaga CRNA  May 24, 2023  1:04 PM

## 2023-05-24 NOTE — ANESTHESIA PREPROCEDURE EVALUATION
Anesthesia Pre-Procedure Evaluation    Patient: Naila Talley   MRN: [id number] : 1950        Procedure : Procedure(s):  Robotic assisted supracervical hysterectomy, bilateral salpingo oophorectomy  possible rectocele repair  Robotic ventral rectopexy  Robotic assisted Sacrocolpopexy, Midurethral bladder Sling, Cystoscopy          Past Medical History:   Diagnosis Date     Fibromyalgia      Insomnia      Osteoarthritis      Osteopenia      Uncomplicated asthma       Past Surgical History:   Procedure Laterality Date     COLONOSCOPY N/A 2023    Procedure: Colonoscopy (CRSAL) with biopsies using regular forceps;  Surgeon: Caro Trevizo MD;  Location:  GI     ORTHOPEDIC  REFERRAL  2016    Right rotator cuff repair     ORTHOPEDIC SURGERY      Right knee replacement     ORTHOPEDIC SURGERY      Left knee replacement     ORTHOPEDIC SURGERY      Right knee replacement Redo     ORTHOPEDIC SURGERY      Steel plate-right femur fractured     RECTAL SURGERY        Allergies   Allergen Reactions     Ciprofloxacin Diarrhea     Fluticasone-Salmeterol      Pt states her hair fell out taking this medication       Social History     Tobacco Use     Smoking status: Never     Smokeless tobacco: Never   Vaping Use     Vaping status: Never Used   Substance Use Topics     Alcohol use: Never      Wt Readings from Last 1 Encounters:   23 85.7 kg (189 lb)        Anesthesia Evaluation            ROS/MED HX  ENT/Pulmonary:     (+) asthma     Neurologic:       Cardiovascular:     (+) Dyslipidemia hypertension-----Previous cardiac testing   Echo: Date: 10/22 Results:  Left ventricular size, wall motion and function are normal. The ejection  fraction is 60-65%.  Normal right ventricle size and systolic function.  There is mild to moderate (1-2+) aortic regurgitation.  IVC diameter <2.1 cm collapsing >50% with sniff suggests a normal RA pressure  of 3  mmHg.  ______________________________________________________________________________  Left Ventricle  Left ventricular size, wall motion and function are normal. The ejection  fraction is 60-65%. There is borderline concentric left ventricular  hypertrophy. A sigmoid septum is present. Echo findings are not consistent  with left ventricular outflow obstruction. Left ventricular diastolic function  is normal. No regional wall motion abnormalities noted.     Right Ventricle  Normal right ventricle size and systolic function.     Atria  Normal left atrial size. Right atrial size is normal. There is no color  Doppler evidence of an atrial shunt.     Mitral Valve  Mitral valve leaflets appear normal. There is no evidence of mitral stenosis  or clinically significant mitral regurgitation.     Tricuspid Valve  Tricuspid valve leaflets appear normal. There is no evidence of tricuspid  stenosis or clinically significant tricuspid regurgitation.     Aortic Valve  The aortic valve is not well visualized. Mild aortic valve calcification is  present. A bicuspid aortic valve cannot be excluded. Aortic valve leaflets  appear normal. There is no evidence of aortic stenosis or clinically  significant aortic regurgitation. There is mild to moderate (1-2+) aortic  regurgitation.     Pulmonic Valve  The pulmonic valve is not well seen, but is grossly normal. This degree of  valvular regurgitation is within normal limits.     Vessels  The aorta root is normal. Normal size ascending aorta. IVC diameter <2.1 cm  collapsing >50% with sniff suggests a normal RA pressure of 3 mmHg.  Stress Test: Date: Results:    ECG Reviewed: Date: 10/22 Results:  NSR  Cath: Date: Results:      METS/Exercise Tolerance:     Hematologic:       Musculoskeletal:   (+) arthritis,     GI/Hepatic:       Renal/Genitourinary:       Endo:       Psychiatric/Substance Use:       Infectious Disease:       Malignancy:       Other: Comment: Fibromyalgia               OUTSIDE LABS:  CBC:   Lab Results   Component Value Date    WBC 4.9 11/07/2022    WBC 15.2 (H) 10/08/2022    HGB 12.8 05/01/2023    HGB 12.4 12/09/2022    HCT 40.4 11/07/2022    HCT 30.8 (L) 10/08/2022     11/07/2022     (H) 10/08/2022     BMP:   Lab Results   Component Value Date     11/07/2022     10/06/2022    POTASSIUM 4.1 11/07/2022    POTASSIUM 3.8 10/06/2022    CHLORIDE 103 11/07/2022    CHLORIDE 99 10/06/2022    CO2 22 11/07/2022    CO2 30 (H) 10/06/2022    BUN 5.7 (L) 11/07/2022    BUN 12.2 10/06/2022    CR 0.74 11/07/2022    CR 0.61 10/06/2022     (H) 11/07/2022     (H) 10/06/2022     COAGS: No results found for: PTT, INR, FIBR  POC: No results found for: BGM, HCG, HCGS  HEPATIC:   Lab Results   Component Value Date    ALBUMIN 4.4 11/07/2022    PROTTOTAL 6.7 11/07/2022    ALT 9 (L) 11/07/2022    AST 25 11/07/2022    ALKPHOS 84 11/07/2022    BILITOTAL 0.2 11/07/2022     OTHER:   Lab Results   Component Value Date    LACT 4.0 (HH) 10/08/2022    ROZINA 9.5 11/07/2022       Anesthesia Plan    ASA Status:  2                    Consents            Postoperative Care            Comments:                Roger Maldonado MD

## 2023-05-24 NOTE — INTERVAL H&P NOTE
"I have reviewed the surgical (or preoperative) H&P that is linked to this encounter, and examined the patient. There are no significant changes.  I met with the patient and her  in the pre-induction area and once again discussed the proposed procedure, pros, cons, risks and benefits.  She is aware that the GYN portion of the surgery will be a daVinci supracervical hysterectomy, bilateral salpingo-oophorectomy and possible rectocele repair in combination with the Urology portion of the surgery and Colorectal procedure.  The patient is aware that the procedure will be using synthetic mesh as well as the FDA warnings and concerns.  All questions were answered, the patient was marked and consent forms were signed.      Clinical Conditions Present on Arrival:  Clinically Significant Risk Factors Present on Admission                 # Drug Induced Platelet Defect: home medication list includes an antiplatelet medication  # Obesity: Estimated body mass index is 31.45 kg/m  as calculated from the following:    Height as of this encounter: 1.651 m (5' 5\").    Weight as of this encounter: 85.7 kg (189 lb).       "

## 2023-05-24 NOTE — PROGRESS NOTES
PTA medications updated by Medication Scribe prior to surgery via phone call with patient (last doses completed by Nurse)     Medication history sources: Patient, Surescripts and H&P  In the past week, patient estimated taking medication this percent of the time: Greater than 90%      Significant changes made to the medication list:  None      Additional medication history information:   Pt will complete antibiotic course: 5/23 PM  * Metronidazole 500 mg po Three times day before surgery; at 1pm, 2pm, and 11pm    Medication reconciliation completed by provider prior to medication history? No    Time spent in this activity: 35 MINUTES    The information provided in this note is only as accurate as the sources available at the time of update(s)      Prior to Admission medications    Medication Sig Last Dose Taking? Auth Provider Long Term End Date   albuterol (PROAIR HFA/PROVENTIL HFA/VENTOLIN HFA) 108 (90 Base) MCG/ACT inhaler Inhale 2 puffs into the lungs every 6 hours as needed for shortness of breath / dyspnea or wheezing  at PRN Yes Kam Gaytan MD Yes    aspirin 81 MG EC tablet Take 81 mg by mouth At Bedtime  at PM Yes Unknown, Entered By History     calcium citrate (CITRACAL) 950 (200 Ca) MG tablet Take 1 tablet by mouth daily (with lunch)  at 1200 Yes Unknown, Entered By History     cholecalciferol (VITAMIN D3) 25 mcg (1000 units) capsule Take 1 capsule by mouth daily (with dinner)  at PM Yes Unknown, Entered By History     Multiple Vitamins-Minerals (OCUVITE PRESERVISION PO) Take 1 tablet by mouth daily (with dinner)  at PM Yes Unknown, Entered By History     multivitamin, therapeutic (THERA-VIT) TABS tablet Take 1 tablet by mouth daily  Yes Unknown, Entered By History     Omega-3-6-9 CAPS Take 1 capsule by mouth daily  Yes Unknown, Entered By History     rosuvastatin (CRESTOR) 5 MG tablet Take 5 mg by mouth At Bedtime  at PM Yes Reported, Patient Yes    traZODone (DESYREL) 100 MG tablet Take 100 mg by  mouth At Bedtime  at PM Yes Reported, Patient Yes

## 2023-05-24 NOTE — OP NOTE
PREOPERATIVE DIAGNOSIS:   Full-thickness rectal prolapse  Cystocele, enterocele, and uterine prolapse  Stress urinary incontinence    POSTOPERATIVE DIAGNOSIS: Same.     OPERATION PERFORMED:   1. Robotic ventral rectopexy with exclusion of the small bowel from the pelvis using mesh (Dr. Trevizo)  2.  Posterior suture rectopexy (Dr. Trevizo)  3. Robotic sacrocolpopexy and cystoscopy (Dr. Cronin)  4. Robotic hysterectomy and bilateral salpingo-oophorectomy (Dr. Oconnor)  5.  Mid urethral sling placement (Dr. Cronin)    SURGEON: Carmella Trevizo MD   ASSISTANT: Radha Almodovar PA-C,  COSURGEON: Armani Cronin MD (assist: David Bill PAC)  COSURGEON: Sergio Oconnor MD     ANESTHESIA: General.     INDICATION: Naila Talley is a 72 year old female who has a history of full-thickness rectal prolapse.  She had a prior suture rectopexy several years ago and developed an early recurrence.  During her initial surgery with an outside surgeon there was some injury to the rectal wall posteriorly that was fixed at the time of surgery transanally.  She was then seen at the pelvic floor center for full testing and was noted to have full-thickness rectal prolapse as well as multicompartment prolapse.  She was then referred to Dr. Cronin for evaluation of a combo repair as well as a hysterectomy by Dr. Oconnor.. Her symptoms of irregular bowel habits, constipation and pelvic pressure have been progressively becoming worse. Defecography confirmed the presence of internal intussusception that progresses into full-thickness rectal prolapse, with a large enterocele. She has combined anterior, middle and posterior pelvic compartment prolapse. The risks and benefits of proceeding with a robotic ventral rectopexy and sacrocolpopexy have been discussed with Naila Talley and she would like to proceed.     OPERATIVE FINDINGS: Hysterectomy and bilateral salpingo-oophorectomy was performed by Dr. Oconnor.  The patient has a very deep pouch  of Chu. A ventral rectopexy and rectocele repair was performed using permanent, light weight prolene, mesh. The mesh was sutured to the levator muscles and anterior rectum, then Dr. Cronin secured a separate piece of mesh to the vagina apex and sacral promontory. The mesh was completely reperitonealized.  Posterior suture rectopexy was performed.  There was significant adhesions in the posterior presacral plane from the patient's prior surgery.  Cystoscopy was performed by Dr. Cronin and was normal.    PROCEDURE IN DETAIL: After informed consent was obtained, the patient was brought to the operating room and placed in the supine position on the operating room table. Sequential compression devices were placed on bilateral lower extremities prior to induction, and general anesthesia was induced without difficulty. She was placed in a well-padded dorsal lithotomy position and IV antibiotics were administered. A Pigazzi pink pad was used on the operating room table to prevent her from sliding off the table in steep Trendelenburg position. Her abdomen was sterilely prepped and draped in standard fashion.    Dr. Cronin then placed a arce catheter without difficulty. The Uday vaginal retractor was placed into the vagina and upward traction was placed on the vagina.   A 8 mm supraumbilical vertical incision was made in the skin with a #11 blade after instillation of 0.5% Marcaine in the skin and subcutaneous tissue for placement of the camera port. A Veress needle was inserted into the abdominal cavity through this incision. After a positive saline drop test, the abdomen was insufflated with CO2 gas to a pressure of 15 mmHg. A 8 mm bladeless robotic trocar was inserted into the abdominal cavity through the supraumbilical port incision. A 8 mm 30-degree robotic scope was inserted into the abdomen, and the abdomen was explored. The operative findings are noted above. Four additional bladeless trocars were  inserted into the abdominal cavity in the following locations after instillation of 0.5% Marcaine in the abdominal wall.   1. An 8 mm robotic port placed in the mid clavicular line on the right side of the abdomen at least 8 cm lateral to the robotic camera port.   2. An 8 mm robotic port was placed in the mid clavicular line on the left side of the abdomen at least 8 cm lateral to the supraumbilical camera port.   3. An 8 mm robotic port was placed 4 cm above the left superior iliac crest and 8 cm lateral from the other robotic port on the left side of the abdomen.   4. A 5 mm AirSeal assistant port was placed on the right side of the abdomen 5 cm lateral and just inferior to the right sided robotic port.     The patient was placed in steep Trendelenburg position. The Polimetrix XI robot was docked to the 4 robotic ports.   Dr. Oconnor performed a robotic hysterectomy and bilateral salpingo-oophorectomy. Once this was completed, Dr. Cronin began dissection for a sacrocolpopexy.  The peritoneum distal to where the inferior mesenteric artery was identified was opened using cautery with robotic scissors in robot arm #1. Eventually, the sacral promontory and the anterior longitudinal ligament was clearly visible and suitable for the mesh to eventually be secured in this location.  We could see the patient prior Ethibond sutures had pulled off of the sacrum.  Two 0-prolene sutures were pre-placed in the anterior longitudinal ligament in preparation of final mesh fixation and the needles were cut off. The peritoneum was then opened in continuation along the right side of the rectum within the avascular plane. Once the peritoneal reflection had been opened along the right side, this was carried down in the avascular plane. The lateral ligaments remained intact. Dr. Cronin raised flaps of peritoneum anteriorly and inferiorly over the vaginal apex.     I performed the deep pelvic dissection within the rectovaginal septum,  working independently at the robot console. I continued to raise the flap inferiorly along the posterior wall of the vagina into the rectovaginal septum. Prior to opening the peritoneal reflection of the rectovaginal septum, the vagina was retracted towards the bladder using a vaginal retracting device.There was a large amount of redundancy and laxity in the tissue in this plane. Dissection proceeded within the rectovaginal septum for approximately 5-6 cm, down to the level of the levator muscles. The levator muscles were exposed on both sides of the rectum.  The dissection was carried down to the pelvic floor to ensure adequate repair of rectal intussusception and rectocele.  The rectal width at the level of the levator muscles measured 6 cm.  I then performed posterior dissection.  This dissection was somewhat difficult given the patient's prior posterior suture rectopexy.  I did not go all the way to the pelvic floor posteriorly as more scar tissue was encountered posteriorly from the patient's prior rectal injury.    The light weight, permanent mesh placed through the 5 mm right upper quadrant port and laid flat within the pelvis, with the distal portion extending over the anterior portion of the rectum at the level of the levator muscles.   In preparation for sewing the mesh into place, a needle  was placed in robotic arm #2 and robotic arm #1. Radha Almodovar PA-C, bedside assist, was able to pass 2-0 Ethibond sutures through the 5 mm assistant port, and 2-0 Ethibond suture was then used to secure the mesh to the levators on both sides of the rectum anteriorly. Additional 3-0 PDS and 2-0 Vicryl sutures were then used to secure the mesh to the anterior portion of the rectum, progressing proximally on the rectum. These sutures were placed in seromuscular layers of the rectum.  The mesh was secured to the anterior rectal wall at the most distal extent to ensure repair of rectocele.  The mesh was not on tension  across the distal rectum. This completed the ventral rectopexy and rectocele repair portion of the mesh placement.    Dr. Cronin then performed a sacrocolpopexy by securing an additional piece of mesh to the vaginal apex.  Two 2-0 Prolene sutures were used to secure the mesh at the pre-cleared location on the sacral promontory. These stitches were secured to the anterior longitudinal ligament. The mesh sat comfortably within the pelvis. We then performed a posterior suture rectopexy. The rectal mesentery was retracted out of the pelvis, and a point in the mid rectum selected for the rectopexy. Two 2-0 Prolene sutures were placed through the rectal mesentery just lateral to the bowel wall, through the anterior longitudinal ligament and the pelvic mesh, and back through the mesentery in a mattress fashion. These were then secured, completing the suture rectopexy.  The mesh was completely reperitonealized by closing the peritoneum using 2-0 Vicryl suture.    I rescrubbed into the procedure to remove the uterus and ovaries from the abdominal cavity by making the supraumbilical port site slightly longer.  All robotic instruments were removed from the abdomen and the robot was undocked.  I closed the fascia of the supraumbilical port with 0-vicryl suture. Radha Almodovar closed the robotic port sites with interrupted 4-0 monocryl stitches and dermabond.  Dr. Cronin performed cystoscopy and placed a bladder sling.     The patient tolerated this procedure well, without complications. Estimated blood loss was 10 mL. I was present and scrubbed for my portion of this operation. The patient was returned to the supine position, extubated in the operating room, and brought to the recovery room in stable condition.     JOSE C BEY MD

## 2023-05-24 NOTE — ANESTHESIA PROCEDURE NOTES
Airway       Patient location during procedure: OR       Procedure Start/Stop Times: 5/24/2023 7:57 AM  Staff -        Anesthesiologist:  Roger Maldonado MD       CRNA: Mady Hinojosa APRN CRNA       Other Anesthesia Staff: Maria E Gross       Performed By: anesthesiologist, CRNA and SRNA  Consent for Airway        Urgency: elective  Indications and Patient Condition       Indications for airway management: rere-procedural and airway protection       Induction type:intravenous       Mask difficulty assessment: 1 - vent by mask    Final Airway Details       Final airway type: endotracheal airway       Successful airway: ETT - single  Endotracheal Airway Details        ETT size (mm): 7.0       Cuffed: yes       Successful intubation technique: video laryngoscopy       VL Blade Size: Avelar 3       Grade View of Cords: 1       Adjucts: stylet       Position: Left       Measured from: gums/teeth       Secured at (cm): 21       Bite block used: None    Post intubation assessment        Placement verified by: capnometry, equal breath sounds and chest rise        Number of attempts at approach: 1       Number of other approaches attempted: 0       Secured with: pink tape       Ease of procedure: easy       Dentition: Intact and Unchanged    Medication(s) Administered   Medication Administration Time: 5/24/2023 7:57 AM

## 2023-05-24 NOTE — ANESTHESIA POSTPROCEDURE EVALUATION
Patient: Naila Talley    Procedure: Procedure(s):  Robotic assisted supracervical hysterectomy, bilateral salpingo oophorectomy  Robotic ventral rectopexy with mesh and suture rectopexy  Robotic assisted Sacrocolpopexy, Midurethral bladder Sling, Cystoscopy       Anesthesia Type:  No value filed.    Note:     Postop Pain Control: Uneventful            Sign Out: Well controlled pain   PONV:    Neuro/Psych: Uneventful            Sign Out: Acceptable/Baseline neuro status   Airway/Respiratory: Uneventful            Sign Out: Acceptable/Baseline resp. status   CV/Hemodynamics: Uneventful            Sign Out: Acceptable CV status; No obvious hypovolemia; No obvious fluid overload   Other NRE:    DID A NON-ROUTINE EVENT OCCUR?            Last vitals:  Vitals Value Taken Time   /75 05/24/23 1515   Temp 36.6  C (97.8  F) 05/24/23 1415   Pulse 85 05/24/23 1518   Resp 17 05/24/23 1518   SpO2 95 % 05/24/23 1518   Vitals shown include unvalidated device data.    Electronically Signed By: Roger Maldonado MD  May 24, 2023  3:19 PM

## 2023-05-24 NOTE — OP NOTE
Procedure Date: 05/24/2023    PREOPERATIVE DIAGNOSIS:  Symptomatic pelvic organ prolapse with urinary and fecal incontinence/rectal prolapse.    POSTOPERATIVE DIAGNOSIS:  Symptomatic pelvic organ prolapse with urinary and fecal incontinence/rectal prolapse, with abdominal and pelvic adhesions.    PROCEDURES:  By Dr. Nilo Oconnor, examination under anesthesia, da Mandie supracervical hysterectomy, bilateral salpingo-oophorectomy, lysis of left adnexal adhesions; by Dr. Zulema Cronin, da Mandie sacrocolpopexy, midurethral sling procedure and cystoscopy, by Dr. Carmella Trevizo, da Mandie ventral rectopexy.    ANESTHESIA:  General.    ESTIMATED BLOOD LOSS:  Less than 10 mL for GYN portion.    PATIENT IDENTIFICATION:  The patient is a 72-year-old patient who is to undergo the above-named procedure for definitive management of symptomatic pelvic floor prolapse with rectal prolapse.  The patient was referred by Dr. Zulema Cronin for evaluation of possible surgical combined procedure.  Originally, the patient was seen by Dr. Trevizo for evaluation of obstructed defecation and fecal incontinence.  The patient's symptoms had significantly worsened in the last year and a half.  The patient underwent evaluation by Dr. [facility] and she was felt to be an excellent candidate for a ventral rectopexy.  Dr. Cronin evaluated the patient and agreed that she would also benefit from a sacrocolpopexy.  The patient was also determined to require a midurethral sling for urinary incontinence.  Her examination did show a grade III to grade IV cystocele, grade III-IV uterine and apical prolapse and a smaller grade I-II rectocele.  I consulted with the patient initially in 03/2023.  The surgery was then scheduled for 05/24/2023.  The patient returned to our office on 05/04 for a pelvic ultrasound and presurgical discussion.  The ultrasound was completely normal, showing an atrophic uterus, a very thin atrophic endometrium  and nonvisualized bilateral adnexa.  There was no free pelvic fluid.  The patient was counseled regarding the anticipated procedure, which from a gynecologic perspective would be da Mandie supracervical hysterectomy, bilateral salpingo-oophorectomy and possible rectocele repair.  However, given the plan for a ventral rectopexy and a sacrocolpopexy, any posterior repair was felt to be not likely.  Appropriate consent forms were signed.  The patient's questions were answered.  She also consented to transfusion should it be necessary.    OPERATIVE FINDINGS:  At the time of examination under anesthesia, the external genitalia and vagina were normal.  There was a grade III cystocele as well as grade III uterine and apical prolapse, a smaller grade I rectocele was present.  At the time of da Mandie laparoscopy, there were noted to be adhesions of the omentum to the anterior abdominal wall as well as adhesions in the area of the cecum.  There were adhesions of the left adnexa, which obscured the left ovary and fallopian tube.  This required lysis of adhesions in order to accomplish left salpingo-oophorectomy.  Uterine corpus was atrophic and appeared normal.  The right ovary and fallopian tube were normal.  Again, the left fallopian tube and left ovary were encased in adhesions, which required lysis.  The course of the ureters was identified throughout the entire procedure.  The upper abdomen was normal.  At the conclusion of the procedure, the uterine corpus had been removed.  The cervix remained for attachments of mesh for the sacrocolpopexy and ventral rectopexy.  Both tubes and ovaries had also been removed.  There were no complications of the gynecologic portion of the surgery and the blood loss was less than 10 mL.  Surgical specimens included the uterus without the cervix, bilateral tubes and ovaries.      OPERATIVE PROCEDURE:  The patient was taken to the operating room in the robot unit of [facility].  She was placed in the supine position.  Both intravenous and inhalation anesthesia administered, and endotracheal intubation was performed.  An orogastric tube was also placed.  Examination under anesthesia was performed with the patient in the dorsolithotomy position.  The findings are documented above.  The patient was thoroughly prepped and draped.  All instruments were prepared.  A timeout was then held, during which the patient's identity, the surgery to be performed, the administration of 2 grams of Mefoxin and 500 mg of Flagyl were documented.  All members of the operating room staff were in agreement with the patient's identity and the surgery to be performed.  Once this was accomplished, the procedure began.  I began the surgery vaginally by placing a Liu catheter under sterile conditions.  A speculum was placed and the cervix was visualized.  The cervix was grasped using a single tooth tenaculum.  The cervix was then dilated to a size 6 Hegar dilator.  This allowed placement of the Hulka tenaculum, the cannula of which was covered by the end of a red Garcia catheter.  The Hulka tenaculum was attached to the anterior cervix.  This was to be used for manipulation of the uterus during the procedure and for identification of the endocervical canal at the time of supracervical hysterectomy.  Meanwhile, Dr. Cronin and Dr. Trevizo placed the abdominal ports.  First, a pneumoperitoneum was achieved with a Veress needle instilling CO2 under low filling pressures.  The da Mandie ports were then strategically placed.  There was a da Mandie port in the right lateral abdomen, a 5 mm AirSeal in the right upper quadrant, the supraumbilical laparoscope and two  da Mandie ports in the left lateral abdomen.  Once the ports were all in place, the da Mandie Xi robot was brought into the surgical field and docked.  Tissue targeting was performed.  It should be mentioned that prior to docking the robot, the  surgical table was brought to its lowest-most position and steep Trendelenburg.  The da Mandie was docked in the usual fashion.  I then took my position at the da Mandie console.  The surgical instruments were placed under direct visualization.  Through arm #1 was the Cadiere device, arm #2 was a Maryland bipolar forceps, arm #4 was the da Mandie monopolar scissors.  Once the devices were all in position, I took control of the instrumentation.  Complete and thorough examination of the abdomen and pelvis was then performed with the findings documented above.  The course of the ureters was identified.  Adhesions of the left adnexa were noted.  Adhesions of the sigmoid colon to the left infundibulopelvic ligament were also taken down.  The procedure was then begun.  The left infundibulopelvic ligament was cauterized and cut.  Dissection was carried along the mesosalpinx excising the residual left ovary that was encased in adhesions.  The dissection was carried to the level of the round ligaments.  On the right side, the right infundibulopelvic ligament was cauterized and cut.  The dissection was carried along the right mesosalpinx to the right round ligament.  The course of the right ureter was also identified.  The round ligaments were then cauterized and cut.  The anterior leaf of the broad ligament was incised in a curvilinear fashion to the level above the cervix.  The bladder was dissected away from the cervix and upper vagina.  Using the Maryland bipolar cautery, the parametria was cauterized and cut.  This included the uterine vascular pedicles.  Dissection was carried down to below the level of the internal os.  Once this was accomplished, the specimen was amputated by cutting across the cervix at a level just below the internal os.  During this process, the Hulka tenaculum cannula was identified, covered by the end of a red Garcia catheter.  The specimen was transected.  The uterine specimen with right ovary  and right tube attached was then placed into an EndoCatch bag.  The left ovary, which had been freed up from left adnexal adhesions and left fallopian tube was also placed in the EndoCatch bag separately.  The Hulka tenaculum was removed and replaced using a stick sponge.  The endocervical canal was cauterized with a Maryland bipolar forceps.  Complete hemostasis of the pelvis was confirmed.  Once this was accomplished, Dr. Cronin replaced me at the da Mandie console, I rescrubbed and entered the operating room at the patient's perineum.  This, to assist Dr. Cronin in beginning her sacrocolpopexy dissection.  Once Dr. Cronin had been completely dissected out the posterior peritoneum as well as anterior and posterior on the cervix, Dr. Trevizo took her position at the da Mandie console.  Dr. Trevizo and her assistant then performed a ventral rectopexy.  This was to be followed by Dr. Cronin's sacrocolpopexy, midurethral sling procedure and cystoscopy.  The uterine specimen within the EndoCatch bag was to be removed prior to abdominal incision closure.  This would be performed by Dr. Cronin or Dr. Trevizo.  The gynecologic portion of the procedure was concluded.  The estimated blood loss was less than 10 mL.  A debriefing was held, at which time the procedure being a da Mandie supracervical hysterectomy, bilateral salpingo-oophorectomy and lysis of adhesions was confirmed.  The surgical specimens were the uterus without the cervix, bilateral fallopian tubes and ovaries.  The left ovary and left fallopian tube were encased in adhesions and will be identified at the time of surgical pathology.  The patient was in excellent condition, remaining anesthetized as I departed the operating room to allow Dr. Trevizo and Dr. Cronin to complete their portions of the surgery.    Nilo Oconnor MD        D: 05/24/2023   T: 05/24/2023   MT: [email or web address]    Name:     DARRIUS CARBONEKhang  MRN:      [id number]        Account:         [id number]   :      1950           Procedure Date: 2023     Document: [id number]

## 2023-05-24 NOTE — BRIEF OP NOTE
[facility]    Brief Operative Note    Pre-operative diagnosis: Prolapse of female genital organs [N81.9]  Rectal prolapse [K62.3]  Post-operative diagnosis same, with abdominal and pelvic adhesions    Procedure: Procedure(s):  Robotic assisted supracervical hysterectomy, bilateral salpingo oophorectomy  Robotic ventral rectopexy  Robotic assisted Sacrocolpopexy, Midurethral bladder Sling, Cystoscopy  Surgeon: Surgeon(s) and Role:  Panel 1:     * Nilo Oconnor MD - Primary  Panel 2:     * Caro Trevizo MD - Primary     * Radha Almodovar PA-C - Assisting  Panel 3:     * Armani Cronin MD - Primary     * David Bill PA-C - Assisting  Anesthesia: General   Estimated Blood Loss: Less than 10 ml    Drains: None  Specimens:   ID Type Source Tests Collected by Time Destination   1 : Uterus, bilateral fallopian tubes and ovaries Tissue Uterus, Bilateral Fallopian Tubes & Ovaries SURGICAL PATHOLOGY EXAM Nilo Oconnor MD 5/24/2023  9:47 AM      Findings: BUS, EG normal.  Grade III cystocele, grade III uterine and apical prolapse, grade I rectocele. Adhesions of the omentum to the anterior abdominal wall.   Adhesions of the left adnexa requiring lysis to complete left salpingo-oophorectomy.  Normal right ovary and tube.  Normal uterus.    Complications: None.  Implants: Coloplast mesh as documented.

## 2023-05-24 NOTE — OP NOTE
Procedure Date: 05/24/2023    PREOPERATIVE DIAGNOSES:  Cystocele grade 3, enterocele grade 2, uterine prolapse grade 2, stress incontinence and full-thickness prolapse.    POSTOPERATIVE DIAGNOSES:  Cystocele grade 3, enterocele grade 2, uterine prolapse grade 2, stress incontinence and full-thickness prolapse.    PROCEDURE: Robotically-assisted sacral colpopexy, midurethral sling placement and cystoscopy.    FIRST SURGEON:  Armani Cronin MD    FIRST ASSISTANT:  David Bill PA-C, in conjunction with robotically-assisted supracervical hysterectomy, bilateral salpingo-oophorectomy by Dr. Oconnor, first assist Dr. Cronin in conjunction with robotically-assisted rectopexy by Dr. Trevizo.    ESTIMATED BLOOD LOSS:  10 mL.    COMPLICATIONS:  None.    SPECIMENS:  Uterus with no cervix, bilateral fallopian tubes and ovaries in the bag.    INDICATIONS FOR PROCEDURE:  The patient is a 72-year-old female with a history of symptomatic vaginal pelvic organ prolapse, cystocele grade 3, stress incontinence and uterine prolapse, with also full thickness prolapse, who presents for above procedure with the risks of bleeding, infection, injury, need for additional surgery, mesh erosion, dyspareunia, new onset of urge incontinence, stress incontinence and small-bowel obstruction.  She would like to proceed.    DESCRIPTION OF PROCEDURE:  The patient was brought to the operating room and placed in supine position.  After excellent induction of general anesthesia, her perineum was prepped and draped and abdominal areas were prepped and draped in the regular fashion.  OG tube and Liu catheter were placed.  Midline incision was made above the umbilicus.  Peritoneum was insufflated to the pressure of the 15. Using a Veress needle, an 8 mm robotic trocar was placed through the midline incision.  Evaluation of the abdominal cavity demonstrated small adhesions, which we did not have to take.  Additional three 8 mm robotic trocars were  placed throughout the abdomen, and a 5 mm AirSeal port was placed in the right abdomen under direct visualization.  Xi robot was docked in and case was turned to Dr. Oconnor to proceed with supracervical hysterectomy and bilateral salpingo-oophorectomy, which I assisted.  Once the specimen was positioned in the EndoCatch bag and stored in the left upper quadrant, I proceeded with the sacral colpopexy.  Peritoneum on top of the sacrum was opened up.  I definitely visualized the prior placed Ethibond stitch x1, which was displaced to the left sacrum and was detached from the sacrum. Anterior longitudinal ligaments were nicely identified, and I positioned 2-0 Prolene sutures through the anterior longitudinal ligament x1.  Peritoneum was opened all the way to the cervical os, which was fulgurated.  The vesicovaginal and rectovaginal fascia were dissected.  I positioned polypropylene type 1 mesh from Coloplast kit called Restorelle and suturing over anterior cervix using 2-0 Ethibond x3 and interrupted 3-0 PDS over anterior vaginal wall.  The cervical os was also closed using 3-0 PDS figure-of-eight sutures x3.  Posteriorly, I also sutured the posterior leaf of the Y mesh to the posterior cervix using 2-0 Ethibond x4, with addition of 3-0 PDS over posterior vaginal wall.  That provided excellent support to the apex of the vagina.  Dr. Trevizo proceeded with rectopexy and suture pexy.  Please see her note for that part of the procedure.  Once 2 grafts were adjusted tensioning over the sacrum, we placed both grafts through the already preplaced suture over the sacrum and sutured down to the ligaments.  Additional 2-0 Prolene suture was placed through both grafts going through the anterior longitudinal ligaments and top of the sacrum.  That provided good support.  Once all extra amount of the graft material was trimmed, the peritoneum was closed on top of the graft using 2-0 Vicryl in a running fashion.  The specimen was  delivered through the midline incision (Dr. Trevizo helped out with that) in the EndoCatch bag and sent it for pathology.  Meanwhile, I proceeded with midurethral sling placement.  Periurethral space was hydrodistended using Marcaine with epinephrine.  A 1.5 cm incision was made at the mid urethra. Space was dissected all the way to the pubic rami bilaterally.  I positioned polypropylene type 1 mesh from Lollipuff kit called Obtryx going through the obturator foramen using outside-in technique.  Once the graft was positioned, I performed cystoscopy that demonstrated no stitch and no mesh in the bladder or urethra.  I was able to see efflux of urine coming from both ureteral orifices.  I adjusted tension-free positioning of the graft by placing 8 Hegar dilators between the Liu in the urethra and the graft itself.  Anterior vaginal wall was closed using 2-0 Vicryl in a running fashion.  Dermabond was applied to the incisions over thigh areas.  Liu catheter was removed and vaginal packing was placed for an hour.    Armani Cronin MD        D: 2023   T: 2023   MT: ashu    Name:     DARRIUS CARBONEKhang  MRN:      [id number]        Account:        [id number]   :      1950           Procedure Date: 2023     Document: [id number]

## 2023-05-24 NOTE — BRIEF OP NOTE
[facility] Brief Operative Note    Pre-operative diagnosis: Prolapse of female genital organs [N81.9]  Rectal prolapse [K62.3]   Post-operative diagnosis            Cystocele garde 3, stress incontinence, rectal prolapse and uterine prolapse grade 2    Procedure: Procedure(s):  Robotic assisted supracervical hysterectomy, bilateral salpingo oophorectomy  Robotic ventral rectopexy with mesh and suture rectopexy  Robotic assisted Sacrocolpopexy, Midurethral bladder Sling, Cystoscopy   Surgeon: Armani Cronin MD   Assistants(s): David Bill PAC   Estimated blood loss: Less than 10 ml    Specimens: Uterus/ thi fallopian tubes and ovaries   Findings:

## 2023-05-25 VITALS
HEART RATE: 73 BPM | HEIGHT: 65 IN | RESPIRATION RATE: 16 BRPM | BODY MASS INDEX: 31.49 KG/M2 | SYSTOLIC BLOOD PRESSURE: 129 MMHG | WEIGHT: 189 LBS | TEMPERATURE: 98.7 F | OXYGEN SATURATION: 98 % | DIASTOLIC BLOOD PRESSURE: 63 MMHG

## 2023-05-25 LAB
ANION GAP SERPL CALCULATED.3IONS-SCNC: 5 MMOL/L (ref 7–15)
BUN SERPL-MCNC: 5.7 MG/DL (ref 8–23)
CALCIUM SERPL-MCNC: 8.9 MG/DL (ref 8.8–10.2)
CHLORIDE SERPL-SCNC: 104 MMOL/L (ref 98–107)
CREAT SERPL-MCNC: 0.74 MG/DL (ref 0.51–0.95)
DEPRECATED HCO3 PLAS-SCNC: 32 MMOL/L (ref 22–29)
ERYTHROCYTE [DISTWIDTH] IN BLOOD BY AUTOMATED COUNT: 13.4 % (ref 10–15)
GFR SERPL CREATININE-BSD FRML MDRD: 84 ML/MIN/1.73M2
GLUCOSE BLDC GLUCOMTR-MCNC: 104 MG/DL (ref 70–99)
GLUCOSE BLDC GLUCOMTR-MCNC: 109 MG/DL (ref 70–99)
GLUCOSE SERPL-MCNC: 101 MG/DL (ref 70–99)
HCT VFR BLD AUTO: 34.5 % (ref 35–47)
HGB BLD-MCNC: 10.9 G/DL (ref 11.7–15.7)
MAGNESIUM SERPL-MCNC: 2 MG/DL (ref 1.7–2.3)
MCH RBC QN AUTO: 30.8 PG (ref 26.5–33)
MCHC RBC AUTO-ENTMCNC: 31.6 G/DL (ref 31.5–36.5)
MCV RBC AUTO: 98 FL (ref 78–100)
PATH REPORT.COMMENTS IMP SPEC: NORMAL
PATH REPORT.FINAL DX SPEC: NORMAL
PATH REPORT.GROSS SPEC: NORMAL
PATH REPORT.MICROSCOPIC SPEC OTHER STN: NORMAL
PATH REPORT.RELEVANT HX SPEC: NORMAL
PHOSPHATE SERPL-MCNC: 3.5 MG/DL (ref 2.5–4.5)
PHOTO IMAGE: NORMAL
PLATELET # BLD AUTO: 232 10E3/UL (ref 150–450)
POTASSIUM SERPL-SCNC: 4.2 MMOL/L (ref 3.4–5.3)
RBC # BLD AUTO: 3.54 10E6/UL (ref 3.8–5.2)
SODIUM SERPL-SCNC: 141 MMOL/L (ref 136–145)
WBC # BLD AUTO: 11.2 10E3/UL (ref 4–11)

## 2023-05-25 PROCEDURE — 36415 COLL VENOUS BLD VENIPUNCTURE: CPT | Performed by: COLON & RECTAL SURGERY

## 2023-05-25 PROCEDURE — 88305 TISSUE EXAM BY PATHOLOGIST: CPT | Mod: 26 | Performed by: PATHOLOGY

## 2023-05-25 PROCEDURE — 83735 ASSAY OF MAGNESIUM: CPT | Performed by: COLON & RECTAL SURGERY

## 2023-05-25 PROCEDURE — 84100 ASSAY OF PHOSPHORUS: CPT | Performed by: COLON & RECTAL SURGERY

## 2023-05-25 PROCEDURE — 250N000013 HC RX MED GY IP 250 OP 250 PS 637: Performed by: COLON & RECTAL SURGERY

## 2023-05-25 PROCEDURE — 80048 BASIC METABOLIC PNL TOTAL CA: CPT | Performed by: COLON & RECTAL SURGERY

## 2023-05-25 PROCEDURE — 250N000011 HC RX IP 250 OP 636: Performed by: COLON & RECTAL SURGERY

## 2023-05-25 PROCEDURE — 85027 COMPLETE CBC AUTOMATED: CPT | Performed by: COLON & RECTAL SURGERY

## 2023-05-25 RX ORDER — DOCUSATE SODIUM 100 MG/1
100 CAPSULE, LIQUID FILLED ORAL 2 TIMES DAILY
Qty: 60 CAPSULE | Refills: 0 | Status: SHIPPED | OUTPATIENT
Start: 2023-05-25 | End: 2023-06-24

## 2023-05-25 RX ORDER — ASPIRIN 81 MG/1
TABLET ORAL
Start: 2023-05-25 | End: 2023-09-05

## 2023-05-25 RX ORDER — CEPHALEXIN 500 MG/1
500 CAPSULE ORAL EVERY 12 HOURS
Qty: 4 CAPSULE | Refills: 0 | Status: SHIPPED | OUTPATIENT
Start: 2023-05-25 | End: 2023-05-27

## 2023-05-25 RX ORDER — HYDROCODONE BITARTRATE AND ACETAMINOPHEN 5; 325 MG/1; MG/1
1 TABLET ORAL EVERY 4 HOURS PRN
Qty: 10 TABLET | Refills: 0 | Status: SHIPPED | OUTPATIENT
Start: 2023-05-25 | End: 2023-05-28

## 2023-05-25 RX ADMIN — CEFAZOLIN 1 G: 1 INJECTION, POWDER, FOR SOLUTION INTRAMUSCULAR; INTRAVENOUS at 12:37

## 2023-05-25 RX ADMIN — CEFAZOLIN 1 G: 1 INJECTION, POWDER, FOR SOLUTION INTRAMUSCULAR; INTRAVENOUS at 04:20

## 2023-05-25 RX ADMIN — METRONIDAZOLE 500 MG: 500 INJECTION, SOLUTION INTRAVENOUS at 08:35

## 2023-05-25 RX ADMIN — ACETAMINOPHEN 975 MG: 325 TABLET ORAL at 08:34

## 2023-05-25 RX ADMIN — ACETAMINOPHEN 975 MG: 325 TABLET ORAL at 00:33

## 2023-05-25 ASSESSMENT — ACTIVITIES OF DAILY LIVING (ADL)
ADLS_ACUITY_SCORE: 20

## 2023-05-25 NOTE — PROGRESS NOTES
Pt discharged home in stable condition. Teaching completed pt verbalized understanding. Pt sent some with belongings and discharge medications

## 2023-05-25 NOTE — PLAN OF CARE
Summary: POD 0 lap hyst BSO with mesh and bladder sling placement   Orientation: A&Ox4   Diet: Fulls  Activity: A1 GB, IV pole ambulating to BR  Vitals: VSS RA ex htn  PIV: LR at 75  GI/: Small amount vaginal blood with standing. Unable to void upon arrival to the unit. Straight cath for 750ml. Due to void   Respiratory: Occasional cough- chronic   Skin: lap cites CDI, petechial rash to forehead noted, not baseline  Plan: possible discharge tomorrow pending   Other details: oxycodone and ice managing pain. Abd binder on.

## 2023-05-25 NOTE — PROVIDER NOTIFICATION
MD Notification    Notified Person: MD    Notified Person Name: Dr. Patel (Urology)    Notification Date/Time: May 24, 2023 2231    Notification Interaction: TORB    Purpose of Notification: Pt has order to remove arce POD #1 at 1000 but arce was pulled at the end of procedure per Hermila note. Pt has had to be straight cathed twice, do you want arce left in until AM rounders see?    Orders Received: Keep arce in overnight, have AM rounders address removal.    Comments:

## 2023-05-25 NOTE — PLAN OF CARE
POD 1 Robotically-assisted supracervical hysterectomy, bilateral salpingo oophorectomy, midurethral sling placement and cystoscopy. A&Ox4, VSS, 2L placed overnight. Pain managed with scheduled tylenol. On full liquid diet and tolerating. Up A1 GB and IV pole. Liu placed and will be removed after rounding. PIV running with LR @ 75ml/hr. Occasional chronic cough.

## 2023-05-25 NOTE — PROGRESS NOTES
"UROLOGY PROGRESS NOTE    May 25, 2023     HPI/SUBJECTIVE:   Feeling well. Pain controlled. No flatus or BM yet. Has not yet ambulated. Arce replaced overnight for PVRs >700 ml x2.    AVSS, on 1 L supp O2 with SpO2 97%      OBJECTIVE:          /63   Pulse 73   Temp 98.7  F (37.1  C) (Oral)   Resp 16   Ht 1.651 m (5' 5\")   Wt 85.7 kg (189 lb)   SpO2 98%   BMI 31.45 kg/m      Intake/Output Summary (Last 24 hours) at 5/25/2023 0758  Last data filed at 5/25/2023 0614  Gross per 24 hour   Intake 2110 ml   Output 2000 ml   Net 110 ml       GENERAL: Awake alert, NAD. Sitting upright in bed.  HEAD, EARS, NOSE, MOUTH, AND THROAT: atraumatic, normocephalic  NECK: symmetric  CHEST WALL: symmetric  CARDIAC: skin well perfused  RESPIRATORY: breathing unlabored  ABDOMEN: soft, non tender, non distended, no rebound or guarding  FLANKS: No CVAT bilaterally  : Arce draining clear urine. No SP tenderness.  SKIN/HAIR/NAILS: no visible rashes  NEUROLOGIC: no focal deficits  PSYCHIATRIC: Speech: normal Mood: normal Affect: normal    ASSESSMENT:  72 year old female, POD #1 s/p Robotically-assisted sacral colpopexy, midurethral sling placement and cystoscopy (Dr. Cronin), robotically-assisted supracervical hysterectomy, bilateral salpingo-oophorectomy by Dr. Oconnor and ventral rectopexy and suture rectopexy (Dr. Trevizo).     PLAN:    - Arce removed 0850. Re-attempt voiding trial today. Bladder scan for PVR x2 prior to discharge. Replace arce and page me if PVR >300 ml.   - ADAT, ambulate, PCDs  - Discussed post-op instructions, expectations, incision care, activity restrictions, follow up instructions, and discharge medications with patient. All questions answered.    Home later today with or without arce, pending voiding trial    Randi Aburto PA-C  [facility]   Office: [phone]  During office hours you can reach me at my pager: [phone]. After 5:00 PM or on weekends, please call the office to be directed " to the on-call urologist.

## 2023-05-25 NOTE — PROGRESS NOTES
"Gyn Post-operative Note POD# 1    S:  Patient doing well.  Pain well controlled on pain medication.  Ambulating.  Tolerating clear diet.  Liu is draining tejinder colored urine.     O:   /71 (BP Location: Right arm)   Pulse 81   Temp 98  F (36.7  C) (Oral)   Resp 16   Ht 1.651 m (5' 5\")   Wt 85.7 kg (189 lb)   SpO2 92%   BMI 31.45 kg/m     No intake/output data recorded.  Gen- A&O, NAD  Abd- soft, non-tender, non-distended, no guarding or rebound  Incision(s)- C/D/I  Ext- Non-tender, no edema    Labs:    Hemoglobin   Date Value Ref Range Status   05/01/2023 12.8 11.7 - 15.7 g/dL Final   ]     Pathology pending    A/P:  72 year old POD# 1 s/p daVinci supracervical hysterectomy, bilateral salpingo-oophorectomy  (left ovary and tube with encasing adhesions, removed separately), sacrocolpopexy, midurethral sling, cystoscopy, ventral rectopexy and suture rectopexy.  Doing well post op with no apparent complications.      1.  Routine post-op cares  2.  Advance cares per routine  3.  Ambulate  4.  Analgesia  5.  The plan of care was discussed with the patient.  She expressed understanding and agreement.  6.  Discharge either today or tomorrow, pending assessment by Colorectal surgery and Urology.   7.  Return to see me in the office around 6-8 weeks post op   8.  All questions answered.    Nilo Oconnor MD  5/25/2023  7:39 AM   "

## 2023-05-25 NOTE — PROGRESS NOTES
"[facility] Progress Note             Interval History:     Postop Day #: 1    Arce replaced overnight for urinary retention.  Minimal abdominal pain. Tolerating diet. No n/v.  No flatus or BM.                Medications:   I have reviewed this patient's current medications               Physical Exam:   Blood pressure 128/71, pulse 81, temperature 98  F (36.7  C), temperature source Oral, resp. rate 16, height 1.651 m (5' 5\"), weight 85.7 kg (189 lb), SpO2 92 %.    Intake/Output Summary (Last 24 hours) at 5/25/2023 0723  Last data filed at 5/25/2023 0614  Gross per 24 hour   Intake 2110 ml   Output 2000 ml   Net 110 ml     GEN:  Alert, NAD  ABD:  abd soft, ND, appropriately tender per incisions, mild rere-incisional ecchymosis         Data:        Lab Results   Component Value Date     11/07/2022    Lab Results   Component Value Date    CHLORIDE 103 11/07/2022    CHLORIDE 101 10/04/2021    Lab Results   Component Value Date    BUN 5.7 11/07/2022    BUN 13 10/04/2021      Lab Results   Component Value Date    POTASSIUM 3.6 05/24/2023    POTASSIUM 3.5 10/04/2021    Lab Results   Component Value Date    CO2 22 11/07/2022    CO2 25 10/04/2021    Lab Results   Component Value Date    CR 0.83 05/24/2023    CR 0.74 11/07/2022        Lab Results   Component Value Date    HGB 12.8 05/01/2023    HGB 12.4 12/09/2022     Lab Results   Component Value Date     11/07/2022     (H) 10/08/2022     Lab Results   Component Value Date    WBC 4.9 11/07/2022    WBC 15.2 (H) 10/08/2022            Assessment and Plan:     POD#1 s/p robotic hysterectomy, BSO, Sacrocolpopexy, ventral rectopexy and suture rectopexy.    - cont fulls  - po pain meds - try to limit narcotics  - arce per Dr. Cronin  - potential discharge later today versus tomorrow.       Carmella Trevizo MD, FACS  Colorectal Surgery     [facility]  [address] [place] [address]  [place] [address]  T: [phone]  F: " [phone]

## 2023-05-30 NOTE — DISCHARGE SUMMARY
[facility]    Discharge Summary  Urology    Date of Admission:  5/24/2023  Date of Discharge:  5/25/2023  3:47 PM  Discharging Provider: Randi Aburto PA-C, ARAVIND    Discharge Diagnoses   Pelvic organ prolapse quantification stage 3 cystocele    History of Present Illness   Naila Talley is an 72 year old female who presented to undergo robotic-assisted sacrocolpopexy, midurethral sling placement and cystoscopy (Dr. Cronin- Urology), supracervical hysterctomy, bilateral salpingo-oopherectomy, lysis of adnexal adhesions (Dr. Oconnor - OBGYN), and robotic ventral rectopexy with mesh and suture rectopexy (Ebony Trevizo/[facility] - Colorectal surgery) for symptomatic pelvic organ prolapse with urinary and fecal incontinence/rectal prolapse.     Hospital Course   Naila Talley was admitted 5/24/2023 for the above-mentioned operation which was executed without complication. Her post operative course was overall uneventful. She was straight cathed on the evening of her procedure for 700 ml and arce replaced was replaced. By POD #1 her pain was controlled, she ambulated without difficulty, tolerated PO intake w/o nausea or vomiting. She successfully completed a voiding trial on POD# 1 with minimal PVRs.     Discharge restrictions and medications were discussed. she was discharged home in stable condition to follow up with Dr. Cronin in 1 month.       Randi Aburto PA-C  [facility]     Significant Results and Procedures   At the time of examination under anesthesia, the external genitalia and vagina were normal.  There was a grade III cystocele as well as grade III uterine and apical prolapse, a smaller grade I rectocele was present.  At the time of da Amndie laparoscopy, there were noted to be adhesions of the omentum to the anterior abdominal wall as well as adhesions in the area of the cecum.  There were adhesions of the left adnexa, which obscured the left ovary and fallopian tube.   This required lysis of adhesions in order to accomplish left salpingo-oophorectomy.  Uterine corpus was atrophic and appeared normal.  The right ovary and fallopian tube were normal.  Again, the left fallopian tube and left ovary were encased in adhesions, which required lysis.  The course of the ureters was identified throughout the entire procedure.  The upper abdomen was normal.  At the conclusion of the procedure, the uterine corpus had been removed.  The cervix remained for attachments of mesh for the sacrocolpopexy and ventral rectopexy.  Both tubes and ovaries had also been removed.  There were no complications of the gynecologic portion of the surgery and the blood loss was less than 10 mL.  Surgical specimens included the uterus without the cervix, bilateral tubes and ovaries.      Pending Results : Surgical pathology - Uterus, cervix,right and left ovaries and fallopian tubes, supracervical hysterectomy and bilateral salpingo-oophorectomy  These results will be followed up by Dr. Oconnor  Unresulted Labs Ordered in the Past 30 Days of this Admission     No orders found from 4/24/2023 to 5/25/2023.          Code Status   Full Code    Primary Care Physician   Danelle Adam    [unfilled]    Time Spent on this Encounter   I, Randi Aburto PA-C, personally saw the patient today and spent greater than 30 minutes discharging this patient.    Discharge Disposition   Discharged to home  Condition at discharge: Good    Consultations This Hospital Stay   VASCULAR ACCESS ADULT IP CONSULT    Discharge Orders      Follow-up and recommended labs and tests     [address] [place] [id number]    Post Operative Sacrocolpopexy Instructions    1. For Pain:   You may take a narcotic/acetaminophen combination prescription for pain relief. You may use cold packs to areas of discomfort as needed to reduce pain and bruising.  Generally over-the-counter medications such as ibuprofen (600 - 800mg 3 - 4 times daily) and  acetaminophen/Tylenol (do not exceed 4000 mg total in a day) can help. If you have pain unrelieved by these measures, call the office.      2. Activity:   Avoid strenuous activity, including lifting over 10-15 pounds or straining for a bowel movement, for the next 6-8 weeks while you are healing from surgery. No driving while you are taking narcotic pain meds (Percocet, Norco, Oxycodone, Hydrocodone, etc.) or for the first 10 days after surgery. No swimming or submersion in water (swimming, tub bath, etc) for 6-8 weeks.    3. Diet:   For the first few days following surgery, eat smaller, lighter meals that are easy to digest. Increase your fluid intake and hydration following surgery. If you have not had a bowel movement prior to leaving the hospital, eat soft food with adequate fiber and consider using stool softener until you have a bowel movement. You may slowly return to your normal diet over the next few days. For healing, we recommend meals that are balanced with protein, vegetables and healthy carbohydrates.  We strongly recommend smoking cessation as it can impede with the healing process.     4. Incision and Vaginal Care:   Your incisions will be covered with a clear paste called Dermabond. This will typically flake off of the skin in 7-10 days after surgery.  You can shower the day after surgery and allow soap and water to run over all of your incisions.  It is common for the incision on the right side of the abdomen to hurt more than the other incisions. If your doctor prescribed premarin cream/estrace cream apply it to the vagina using your fingertip once a day (at bedtime) for one month after the surgery. That helps expedite the vaginal healing and absorption of the vaginal sutures.        5. Shower and Bath:   You may shower 24 hours after surgery but avoid bath tubs/soaking for 2 months after surgery.     6. Bleeding:   Depending on your procedure you may have a small amount of vaginal bleeding (like  a period) which may last 1-3 weeks. If it is heavier than your menstrual period call the office.     7. Pathology  If any tissue sample was collected from your procedure, someone from the office will call you with the results in 7 - 10 business days.       8. Post Op Appointments:   If you have not already been scheduled, you will need to call and schedule an appointment to see Dr. Cronin in 1 month and 3 months after surgery.        Call Dr Cronin's office if you have:  A fever greater than 101 F  Difficulty/painful urination  Problems with nausea/vomiting  Large amount of vaginal drainage.     Activity    8 weeks restriction     Reason for your hospital stay    You were in the hospital for pelvic organ prolapse surgery.     Follow-up and recommended labs and tests     Follow up with Dr. Cronin at 1 month and 3 months post-op.    [facility]  [address] [place] [id number]  Appointment scheduling: [phone]     Follow-up and recommended labs and tests     Follow up in the office in 4 weeks with Dr. Trevizo or at your already scheduled post op visit. Call [phone] to schedule your appointment.   Call the office at [phone] if you develop fever >101, uncontrolled pain, bleeding, nausea, vomiting, or constipation (no stool for 4-5 days).     Wound care and dressings    Your incisions are covered with a clear glue. This will typically flake off the skin in 7-10 days after surgery. You can shower and allow soap and water to run over all of your incisions. It is common for the incision on the right side of the abdomen to hurt more than the other incisions. This will improve with time and you can place a warm or cool pack over the incisions for comfort. If you have rectal soreness or discomfort then you can sit in a warm bath in your own bathtub starting 3 days after surgery. Try to avoid soaking your abdominal incisions underwater. Do not use public baths, pools, lakes or hot tubs  for 6 weeks after surgery.     Diet    Follow this diet upon discharge:  Regular diet     Discharge Medications   Discharge Medication List as of 5/25/2023  1:45 PM      START taking these medications    Details   cephALEXin (KEFLEX) 500 MG capsule Take 1 capsule (500 mg) by mouth every 12 hours for 2 days, Disp-4 capsule, R-0, E-Prescribe      docusate sodium (COLACE) 100 MG capsule Take 1 capsule (100 mg) by mouth 2 times daily for 30 days, Disp-60 capsule, R-0, E-Prescribe      estradiol (ESTRACE) 0.1 MG/GM vaginal cream Pea size on a finger to the vagina QhsDisp-42 g, [id number]      HYDROcodone-acetaminophen (NORCO) 5-325 MG tablet Take 1 tablet by mouth every 4 hours as needed for severe pain, Disp-10 tablet, R-0, Local Print         CONTINUE these medications which have CHANGED    Details   aspirin 81 MG EC tablet HOLD for 5 days after surgery, then resume as previously directed, No Print Out         CONTINUE these medications which have NOT CHANGED    Details   albuterol (PROAIR HFA/PROVENTIL HFA/VENTOLIN HFA) 108 (90 Base) MCG/ACT inhaler Inhale 2 puffs into the lungs every 6 hours as needed for shortness of breath / dyspnea or wheezing, Disp-18 g, R-0, E-PrescribePharmacy may dispense brand covered by insurance (Proair, or proventil or ventolin or generic albuterol inhaler)      calcium citrate (CITRACAL) 950 (200 Ca) MG tablet Take 1 tablet by mouth daily (with lunch), Historical      cholecalciferol (VITAMIN D3) 25 mcg (1000 units) capsule Take 1 capsule by mouth daily (with dinner), Historical      Multiple Vitamins-Minerals (OCUVITE PRESERVISION PO) Take 1 tablet by mouth daily (with dinner), Historical      multivitamin, therapeutic (THERA-VIT) TABS tablet Take 1 tablet by mouth daily, Historical      Omega-3-6-9 CAPS Take 1 capsule by mouth daily, Historical      rosuvastatin (CRESTOR) 5 MG tablet Take 5 mg by mouth At Bedtime, Historical      traZODone (DESYREL) 100 MG tablet Take 100 mg by  mouth At Bedtime, Historical           Allergies   Allergies   Allergen Reactions     Ciprofloxacin Diarrhea     Fluticasone-Salmeterol      Pt states her hair fell out taking this medication      Data   Most Recent 3 CBC's:Recent Labs   Lab Test 05/25/23  0713 05/01/23  1523 12/09/22  1435 11/07/22  0916 10/08/22  0514   WBC 11.2*  --   --  4.9 15.2*   HGB 10.9* 12.8 12.4 12.4 9.7*   MCV 98  --   --  101* 96     --   --  374 525*      Most Recent 3 BMP's:  Recent Labs   Lab Test 05/25/23  0713 05/25/23  0604 05/25/23  0211 05/24/23  0611 11/07/22  0916 10/06/22  0505 10/06/22  0021     --   --   --  139  --  137   POTASSIUM 4.2  --   --  3.6 4.1  --  3.8   CHLORIDE 104  --   --   --  103  --  99   CO2 32*  --   --   --  22  --  30*   BUN 5.7*  --   --   --  5.7*  --  12.2   CR 0.74  --   --  0.83 0.74   < > 0.57   ANIONGAP 5*  --   --   --  14  --  8   ROZINA 8.9  --   --   --  9.5  --  8.9   * 104* 109* 115* 137*  --  166*    < > = values in this interval not displayed.     Most Recent 2 LFT's:  Recent Labs   Lab Test 11/07/22 0916 10/06/22  0021   AST 25 79*   ALT 9* 68*   ALKPHOS 84 122*   BILITOTAL 0.2 0.2     Most Recent INR's and Anticoagulation Dosing History:  Anticoagulation Dose History          View : No data to display.                    Most Recent 3 Troponin's:No lab results found.  Most Recent Cholesterol Panel:  Recent Labs   Lab Test 11/07/22 0916   CHOL 162   LDL 78   HDL 55   TRIG 147     Most Recent 6 Bacteria Isolates From Any Culture (See EPIC Reports for Culture Details):No lab results found.  Most Recent TSH, T4 and A1c Labs:No lab results found.

## 2023-09-05 ENCOUNTER — OFFICE VISIT (OUTPATIENT)
Dept: PULMONOLOGY | Facility: CLINIC | Age: 73
End: 2023-09-05
Payer: MEDICARE

## 2023-09-05 ENCOUNTER — TELEPHONE (OUTPATIENT)
Dept: OTOLARYNGOLOGY | Facility: CLINIC | Age: 73
End: 2023-09-05

## 2023-09-05 VITALS
BODY MASS INDEX: 32.02 KG/M2 | WEIGHT: 192.4 LBS | DIASTOLIC BLOOD PRESSURE: 80 MMHG | SYSTOLIC BLOOD PRESSURE: 150 MMHG | OXYGEN SATURATION: 97 % | HEART RATE: 80 BPM

## 2023-09-05 DIAGNOSIS — R09.82 PND (POST-NASAL DRIP): ICD-10-CM

## 2023-09-05 DIAGNOSIS — R09.89 THROAT CLEARING: ICD-10-CM

## 2023-09-05 DIAGNOSIS — R05.3 CHRONIC COUGH: Primary | ICD-10-CM

## 2023-09-05 PROCEDURE — 99214 OFFICE O/P EST MOD 30 MIN: CPT | Performed by: NURSE PRACTITIONER

## 2023-09-05 RX ORDER — CEPHALEXIN 500 MG/1
500 CAPSULE ORAL 2 TIMES DAILY
COMMUNITY
Start: 2023-08-22 | End: 2023-11-06

## 2023-09-05 RX ORDER — OMEPRAZOLE 40 MG/1
40 CAPSULE, DELAYED RELEASE ORAL DAILY
Qty: 30 CAPSULE | Refills: 1 | Status: SHIPPED | OUTPATIENT
Start: 2023-09-05 | End: 2023-09-29

## 2023-09-05 ASSESSMENT — ASTHMA QUESTIONNAIRES
QUESTION_2 LAST FOUR WEEKS HOW OFTEN HAVE YOU HAD SHORTNESS OF BREATH: ONCE A DAY
QUESTION_4 LAST FOUR WEEKS HOW OFTEN HAVE YOU USED YOUR RESCUE INHALER OR NEBULIZER MEDICATION (SUCH AS ALBUTEROL): NOT AT ALL
ACT_TOTALSCORE: 14
QUESTION_5 LAST FOUR WEEKS HOW WOULD YOU RATE YOUR ASTHMA CONTROL: NOT CONTROLLED AT ALL
QUESTION_3 LAST FOUR WEEKS HOW OFTEN DID YOUR ASTHMA SYMPTOMS (WHEEZING, COUGHING, SHORTNESS OF BREATH, CHEST TIGHTNESS OR PAIN) WAKE YOU UP AT NIGHT OR EARLIER THAN USUAL IN THE MORNING: FOUR OR MORE NIGHTS A WEEK
HOSPITALIZATION_OVERNIGHT_LAST_YEAR_TOTAL: ONE
EMERGENCY_ROOM_LAST_YEAR_TOTAL: ONE
ACT_TOTALSCORE: 14
QUESTION_1 LAST FOUR WEEKS HOW MUCH OF THE TIME DID YOUR ASTHMA KEEP YOU FROM GETTING AS MUCH DONE AT WORK, SCHOOL OR AT HOME: NONE OF THE TIME

## 2023-09-05 NOTE — TELEPHONE ENCOUNTER
"[facility] Call Center    Phone Message    May a detailed message be left on voicemail: yes     Reason for Call: Appointment Intake    Referring Provider Name: Elizabeth Saeed  Diagnosis and/or Symptoms: Chronic cough     I am unable to schedule the patient as the clinic instructed. It will not allow me to \"overide\" anything outside of the decision tree. Please review and conatct pt.    Action Taken: Message routed to:  [facility] ENT    Travel Screening: Not Applicable                                                                    "

## 2023-09-05 NOTE — PATIENT INSTRUCTIONS
- we will see if a reflux medication is helpful.     - I will refer you to ENT at the [facility] to see if this is caused by your vocal cords or sinuses.      If you have worsening symptoms, questions, or need to speak with the nurse please call [phone].

## 2023-09-05 NOTE — PROGRESS NOTES
Pulmonary Outpatient Consult Note  March 9, 2023    Assessment and Plan:   Naila Talley is a 72 year old female with history of asthma and hyperlipidemia, presenting today for hospital follow up for her cough.     #. Reported history asthma -- Symptom pattern does not support this diagnosis. Recent CT does show some evidence of small airway disease or air trapping. IgE and [place] allergy panel WNL. No change in cough while on ICS/LABA. She has tried different inhalers many times in the past with minimal change in symptoms. PFTs are normal and show no significant bronchodilator response. Last FeNO was 16 ppb.   Could consider methacholine challenge in the future for official confirmation.   #. Chronic cough -- The cough continues to be dry and harsh. Questionable ENT or reflux cause. frequent throat clearing and post nasal drip. No reflux symptoms.  Trial of omeprazole for 2 months  ENT referral    #. Pneumonia secondary to rhinovirus -- resolved on CT on 12/27/22, minimal bronchial wall thickening and some suggestion of small airway disease. Small micro nodules noted are not of concern as she was a never smoker.     Trial omeprazole 40mg daily x 2 months, decrease to 20mg daily if helpful. If not change in cough we can discontinue this.   Continue albuterol q4h prn   Continue Flonase BID, if cough and post nasal drip persist could add atrovent nasal spray.     RTC 2 months    Elizabeth Saeed, XAVIER  Pulmonary Medicine  ______________________________________________________________________________    CC:   Chief Complaint   Patient presents with    Follow Up     Asthma; PND. Patient states she has a chronic cough and is coughing all the time       HPI:   She was last seen in clinic in 03/2023. At that time she had no improvement following a trial of Advair, she used this consistently for one month and noticed no difference in her symptoms but she did have significant hair loss so she discontinued use, of note,  her hair has stopped falling out after stopping the Advair. She notices no change ing cough with albuterol use. Continues to manage her mild cough with cough drops. She still endorses frequent throat clearing. Denies sinus pressure or congestion.   No SOB or wheeze.     She was hospitalized from 10/3-10/9/22 for an asthma exacerbation following 10 days of increased cough. CXR suggested pneumonia. During her stay it was found she had rhinovirus.   She reports a childhood history of asthma with frequent bronchitis and croup infections. She had never had formal PFTs to diagnose this and numerous inhaler trials have not been helpful. She has tried what sounds like Kirti many times over the years with minimal help, she thinks she has taking oral steroids in the past but does not remember if they helped. She reports a chronic cough for 20 years she has just dealt with.   Denies reflux, reports having a previous esophagram that was negative. Dinner around 6pm. Bedtime around 11pm.   She admits to frequent throat clearing and occasional post nasal drip, she has not noticed improvement in cough since using Flonase daily.   Denies significant seasonal or environmental allergies.         12/9/2022     3:00 PM 3/9/2023    10:00 AM 9/5/2023    10:00 AM   ACT Total Scores   ACT TOTAL SCORE (Goal Greater than or Equal to 20) 18 18 14   In the past 12 months, how many times did you visit the emergency room for your asthma without being admitted to the hospital? 1 1 1   In the past 12 months, how many times were you hospitalized overnight because of your asthma? 0 1 1       PMH:  Patient Active Problem List    Diagnosis Date Noted    Rectal prolapse 05/24/2023     Priority: Medium    Acute respiratory failure with hypoxia (H) 10/10/2022     Priority: Medium    Mild intermittent asthma with exacerbation 10/10/2022     Priority: Medium    Essential hypertension 10/10/2022     Priority: Medium    Hyperlipidemia LDL goal <100 10/10/2022      Priority: Medium    Community acquired pneumonia, unspecified laterality 10/03/2022     Priority: Medium       PSH:  Past Surgical History:   Procedure Laterality Date    COLONOSCOPY N/A 05/23/2023    Procedure: Colonoscopy (CRSAL) with biopsies using regular forceps;  Surgeon: Caro Trevizo MD;  Location:  GI    DAVINCI HYSTERECTOMY TOTAL, BILATERAL SALPINGO-OOPHORECTOMY, COMBINED Bilateral 5/24/2023    Procedure: Robotic assisted supracervical hysterectomy, bilateral salpingo oophorectomy;  Surgeon: Nilo Oconnor MD;  Location: [facility] OR    DAVINCI RECTOPEXY N/A 5/24/2023    Procedure: Robotic ventral rectopexy with mesh and suture rectopexy;  Surgeon: Caro Trevizo MD;  Location: [facility] OR    DAVINCI SACROCOLPOPEXY, MIDURETHRAL SLING, CYSTOSCOPY N/A 5/24/2023    Procedure: Robotic assisted Sacrocolpopexy, Midurethral bladder Sling, Cystoscopy;  Surgeon: Armani Cronin MD;  Location:  OR    ORTHOPEDIC  REFERRAL  2016    Right rotator cuff repair    ORTHOPEDIC SURGERY  2010    Right knee replacement    ORTHOPEDIC SURGERY  2015    Left knee replacement    ORTHOPEDIC SURGERY  2012    Right knee replacement Redo    ORTHOPEDIC SURGERY  2011    Steel plate-right femur fractured    RECTAL SURGERY  2015       Current Meds:  Current Outpatient Medications   Medication Sig Dispense Refill    albuterol (PROAIR HFA/PROVENTIL HFA/VENTOLIN HFA) 108 (90 Base) MCG/ACT inhaler Inhale 2 puffs into the lungs every 6 hours as needed for shortness of breath / dyspnea or wheezing 18 g 0    calcium citrate (CITRACAL) 950 (200 Ca) MG tablet Take 1 tablet by mouth daily (with lunch)      cephALEXin (KEFLEX) 500 MG capsule Take 500 mg by mouth 2 times daily      cholecalciferol (VITAMIN D3) 25 mcg (1000 units) capsule Take 1 capsule by mouth daily (with dinner)      estradiol (ESTRACE) 0.1 MG/GM vaginal cream Pea size on a finger to the vagina Qhs 42 g 0    Multiple Vitamins-Minerals (OCUVITE PRESERVISION  PO) Take 1 tablet by mouth daily (with dinner)      multivitamin, therapeutic (THERA-VIT) TABS tablet Take 1 tablet by mouth daily      Omega-3-6-9 CAPS Take 1 capsule by mouth daily      rosuvastatin (CRESTOR) 5 MG tablet Take 5 mg by mouth At Bedtime      traZODone (DESYREL) 100 MG tablet Take 100 mg by mouth At Bedtime      aspirin 81 MG EC tablet HOLD for 5 days after surgery, then resume as previously directed           Allergies:  Allergies   Allergen Reactions    Fluticasone-Salmeterol      ADVAIR INHALER: Pt states her hair fell out taking this medication     Ciprofloxacin Diarrhea       Social Hx:  Marital status:   Number of children: 2, grandchildren 1   Resides in a townhouse [address] yr old, no concern for mold.  Occupational history: office work    service: no  Pets: dogs in the past, birds in the past   Smoking history: 0 pack year history  Alcohol use: none   Recreational drug use: none  Hobbies: quilt and paint  Recent Travel: none in the past 5 years, [place] for 20 years     Family HX: family history is not on file.    ROS:   ROS: 10-point review performed and notable for the above mentioned symptoms. The remainder reviewed and negative.     Physical Exam:  BP (!) 150/80 (BP Location: Right arm, Patient Position: Sitting, Cuff Size: Adult Regular)   Pulse 80   Wt 87.3 kg (192 lb 6.4 oz)   SpO2 97%   BMI 32.02 kg/m      Physical Exam  Constitutional:       General: She is not in acute distress.     Appearance: She is not ill-appearing or diaphoretic.   HENT:      Nose: Nose normal.      Mouth/Throat:      Comments: Frequent throat clearing during visit  Cardiovascular:      Rate and Rhythm: Normal rate and regular rhythm.      Pulses: Normal pulses.      Heart sounds: Normal heart sounds.   Pulmonary:      Effort: Pulmonary effort is normal. No respiratory distress.      Breath sounds: No wheezing or rhonchi.      Comments: Harsh, dry cough during exam   Musculoskeletal:      Right  lower leg: No edema.      Left lower leg: No edema.   Skin:     General: Skin is warm and dry.      Findings: No rash.   Neurological:      Mental Status: She is alert.   Psychiatric:         Behavior: Behavior normal.       PFT's (12/9/22):       Impression:  Full Pulmonary Function Test is abnormal.  PFTs are consistent with  no  obstructive disease.  Spirometry is not consistent with reversibility.  There is no hyperinflation.  There is no air-trapping.  Diffusion capacity when corrected for hemoglobin is not reduced.    Labs:   personally reviewed in the EMR.     Latest Reference Range & Units 10/07/22 23:13   Human Rhin/Enterovirus Not Detected  Detected !      Latest Reference Range & Units 03/09/23 11:28   Allergen A alternata <0.10 KU(A)/L <0.10   Allergen A fumigatus <0.10 KU(A)/L <0.10   Allergen, [place] Grass <0.10 KU(A)/L <0.10   Allergen C herbarum <0.10 KU(A)/L <0.10   Allergen Cat Dander <0.10 KU(A)/L <0.10   Allergen Cockroach <0.10 KU(A)/L <0.10   Allergen D farinae <0.10 KU(A)/L <0.10   Allergen, D Pteronyssinus <0.10 KU(A)/L <0.10   Allergen Dog Dander <0.10 KU(A)/L <0.10   Allergen Elm <0.10 KU(A)/L <0.10   Allergen Maple <0.10 KU(A)/L <0.10   Allergen, Mtn [place] <0.10 KU(A)/L <0.10   Allergen Oak(white) <0.10 KU(A)/L <0.10   Allergen P notatum <0.10 KU(A)/L <0.10   Allergen Casey <0.10 KU(A)/L <0.10   Allergen Tree [place] IgE <0.10 KU(A)/L <0.10   Allergen Weed Nettle IgE <0.10 KU(A)/L <0.10   Allergen [place] <0.10 KU(A)/L <0.10   Allergen Marshelder <0.10 KU(A)/L <0.10   Allergen, Mouse Urine <0.10 KU(A)/L <0.10   Allergen, Ragweed Short <0.10 KU(A)/L <0.10   Allergen Russian Thistle <0.10 KU(A)/L <0.10   Allergen, Silver Birch <0.10 KU(A)/L <0.10   Allergen White Tj <0.10 KU(A)/L <0.10   IGE 0 - 114 kU/L  0 - 114 kU/L <2  <2       Imaging studies: personally reviewed and interpreted. Below are the Radiology interpretations.    CT CHEST W/O CONTRAST, DATE/TIME: 12/27/2022 12:45  PM  INDICATION: Follow up pneumonia.  COMPARISON: 10/06/2022  FINDINGS:   LUNGS AND PLEURA: Interval resolution of the patchy multifocal groundglass, consolidative, and tree-in-bud opacities. Mild scattered atelectasis and/or scarring. No new focal consolidation or effusion. There are a few similar scattered pulmonary   micronodules with the largest measuring up to 4 mm in the left upper lobe (series 4/103 and 4/56). There are airway secretions. Mild bronchial wall thickening. No new focal consolidation. Mild mosaic attenuation of the lungs.                                                                IMPRESSION:   1.  Interval resolution of the patchy multifocal pulmonary opacities.  2.  Mild mosaic attenuation of the lungs can be seen with air trapping or small airways disease.  3.  Mild bronchial wall thickening.  4.  Similar scattered pulmonary micronodules measuring up to 4 mm. Follow-up guidelines as below.    CT CHEST PULMONARY EMBOLISM W CONTRAST, DATE/TIME: 10/6/2022   INDICATION: acute hypoxic respiratory failure, elevated D dimer  COMPARISON: None.  FINDINGS:  ANGIOGRAM CHEST: Limited due to respiratory motion. No central or definite peripheral pulmonary artery embolism. Thoracic aorta is negative for dissection. No CT evidence of right heart strain.  LUNGS AND PLEURA: Asymmetric elevation of the left hemidiaphragm. Mild tracheal secretions. Mosaic lung attenuation. Mild left lower lobar consolidation. Bilateral multilobar reticular nodular/tree-in-bud pulmonary opacities which are most pronounced   within the lower lobes. Associated scattered small nodular pulmonary opacities including a 13 mm right lower lobe nodule image 163 series 4. Bilateral multilobar bronchial wall thickening and endobronchial mucoid impaction. No pleural effusion.  MEDIASTINUM/AXILLAE: Mediastinal adenopathy. Normal heart size and no pericardial effusion.  CORONARY ARTERY CALCIFICATION: None.  UPPER ABDOMEN: Colonic  diverticulosis.  MUSCULOSKELETAL: Spinal degenerative changes.                                                                IMPRESSION:  1.  No pulmonary artery embolism.  2.  Findings consistent with bilateral multilobar bronchopneumonia most pronounced within the lung bases and perhaps reflecting aspiration. Recommend CT follow-up in approximately 4-8 weeks after treatment/symptom improvement to exclude underlying lung lesions. No pleural effusion.    CT CHEST W/O CONTRAST, DATE/TIME: 12/27/2022 12:45 PM  INDICATION: Follow up pneumonia.  COMPARISON: 10/06/2022  FINDINGS:   LUNGS AND PLEURA: Interval resolution of the patchy multifocal groundglass, consolidative, and tree-in-bud opacities. Mild scattered atelectasis and/or scarring. No new focal consolidation or effusion. There are a few similar scattered pulmonary   micronodules with the largest measuring up to 4 mm in the left upper lobe (series 4/103 and 4/56). There are airway secretions. Mild bronchial wall thickening. No new focal consolidation. Mild mosaic attenuation of the lungs.  MEDIASTINUM/AXILLAE: Heart size normal. No lymphadenopathy. No thoracic aortic aneurysm.                                                             IMPRESSION:   1.  Interval resolution of the patchy multifocal pulmonary opacities.  2.  Mild mosaic attenuation of the lungs can be seen with air trapping or small airways disease.  3.  Mild bronchial wall thickening.  4.  Similar scattered pulmonary micronodules measuring up to 4 mm. Follow-up guidelines as below.

## 2023-09-29 DIAGNOSIS — R05.3 CHRONIC COUGH: ICD-10-CM

## 2023-09-29 RX ORDER — OMEPRAZOLE 40 MG/1
40 CAPSULE, DELAYED RELEASE ORAL DAILY
Qty: 30 CAPSULE | Refills: 1 | Status: SHIPPED | OUTPATIENT
Start: 2023-09-29 | End: 2023-10-23

## 2023-10-23 DIAGNOSIS — R05.3 CHRONIC COUGH: ICD-10-CM

## 2023-10-23 RX ORDER — OMEPRAZOLE 40 MG/1
40 CAPSULE, DELAYED RELEASE ORAL DAILY
Qty: 90 CAPSULE | Refills: 1 | Status: SHIPPED | OUTPATIENT
Start: 2023-10-23

## 2023-10-31 ENCOUNTER — LAB REQUISITION (OUTPATIENT)
Dept: LAB | Facility: CLINIC | Age: 73
End: 2023-10-31
Payer: MEDICARE

## 2023-10-31 DIAGNOSIS — R35.0 FREQUENCY OF MICTURITION: ICD-10-CM

## 2023-10-31 PROCEDURE — 87086 URINE CULTURE/COLONY COUNT: CPT | Mod: ORL | Performed by: STUDENT IN AN ORGANIZED HEALTH CARE EDUCATION/TRAINING PROGRAM

## 2023-11-02 LAB
BACTERIA UR CULT: ABNORMAL
BACTERIA UR CULT: ABNORMAL

## 2023-11-06 ENCOUNTER — OFFICE VISIT (OUTPATIENT)
Dept: PULMONOLOGY | Facility: CLINIC | Age: 73
End: 2023-11-06
Attending: NURSE PRACTITIONER
Payer: MEDICARE

## 2023-11-06 VITALS
HEART RATE: 82 BPM | WEIGHT: 192.6 LBS | BODY MASS INDEX: 32.05 KG/M2 | OXYGEN SATURATION: 98 % | DIASTOLIC BLOOD PRESSURE: 82 MMHG | SYSTOLIC BLOOD PRESSURE: 139 MMHG

## 2023-11-06 DIAGNOSIS — R05.3 CHRONIC COUGH: Primary | ICD-10-CM

## 2023-11-06 DIAGNOSIS — R09.82 PND (POST-NASAL DRIP): ICD-10-CM

## 2023-11-06 PROCEDURE — 99214 OFFICE O/P EST MOD 30 MIN: CPT | Performed by: NURSE PRACTITIONER

## 2023-11-06 RX ORDER — IPRATROPIUM BROMIDE 42 UG/1
2 SPRAY, METERED NASAL 4 TIMES DAILY
Qty: 15 ML | Refills: 3 | Status: SHIPPED | OUTPATIENT
Start: 2023-11-06 | End: 2023-11-28

## 2023-11-06 NOTE — PROGRESS NOTES
Pulmonary Outpatient Consult Note  November 6, 2023      Assessment and Plan:   Naila Talley is a 73 year old female with history of asthma and hyperlipidemia, presenting today for hospital follow up for her cough.     #. Reported history asthma -- Symptom pattern does not support this diagnosis. Recent CT does show some evidence of small airway disease or air trapping. IgE and midwest allergy panel WNL. No change in cough while on ICS/LABA. She has tried different inhalers many times in the past with minimal change in symptoms. PFTs are normal and show no significant bronchodilator response. Last FeNO was 16 ppb.   Could consider methacholine challenge in the future for official confirmation.   #. Chronic cough -- The cough continues to be dry and harsh. Questionable ENT or reflux cause. frequent throat clearing and post nasal drip. No reflux symptoms. Trial of omeprazole for 2 months did not change cough.   ENT referral. Visit scheduled for January.   Trial of Atrovent nasal spray   Could consider referral to [facility]   #. Pneumonia secondary to rhinovirus -- resolved on CT on 12/27/22, minimal bronchial wall thickening and some suggestion of small airway disease. Small micro nodules noted are not of concern as she was a never smoker.    RTC prn following ENT visit    Elizabeth Saeed, XAVIER  Pulmonary Medicine  ______________________________________________________________________________    CC:   Chief Complaint   Patient presents with    Follow Up     2 MONTH FOLLOW UP: Med check (Omeprazole)  Chronic cough  PND (post-nasal drip)  Throat clearing          HPI:   She was last seen in clinic on 09/05/2023. At that time she had no improvement following a trial of Advair, she used this consistently for one month and noticed no difference in her symptoms but she did have significant hair loss so she discontinued use, of note, her hair has stopped falling out after stopping the Advair. We then  started a trial of omeprazole that did not help. She notices no change in the cough with albuterol use. Continues to manage her mild cough with cough drops. She still endorses frequent throat clearing. Denies sinus pressure or congestion but does feel she has PND. Has not noticed a difference with Flonase use. Has plans to see ENT in January.   No SOB or wheeze.     She reports a childhood history of asthma with frequent bronchitis and croup infections. She had never had formal PFTs to diagnose this and numerous inhaler trials have not been helpful. She has tried what sounds like Kirti many times over the years with minimal help, she thinks she has taking oral steroids in the past but does not remember if they helped. She reports a chronic cough for 20 years she has just dealt with.   Denies reflux, reports having a previous esophagram that was negative. Dinner around 6pm. Bedtime around 11pm.   Denies significant seasonal or environmental allergies.     She was hospitalized from 10/3-10/9/22 for an asthma exacerbation following 10 days of increased cough. CXR suggested pneumonia. During her stay it was found she had rhinovirus.           12/9/2022     3:00 PM 3/9/2023    10:00 AM 9/5/2023    10:00 AM   ACT Total Scores   ACT TOTAL SCORE (Goal Greater than or Equal to 20) 18 18 14   In the past 12 months, how many times did you visit the emergency room for your asthma without being admitted to the hospital? 1 1 1   In the past 12 months, how many times were you hospitalized overnight because of your asthma? 0 1 1       PMH:  Patient Active Problem List    Diagnosis Date Noted    Rectal prolapse 05/24/2023     Priority: Medium    Acute respiratory failure with hypoxia (H) 10/10/2022     Priority: Medium    Mild intermittent asthma with exacerbation 10/10/2022     Priority: Medium    Essential hypertension 10/10/2022     Priority: Medium    Hyperlipidemia LDL goal <100 10/10/2022     Priority: Medium    Community  acquired pneumonia, unspecified laterality 10/03/2022     Priority: Medium       PSH:  Past Surgical History:   Procedure Laterality Date    COLONOSCOPY N/A 05/23/2023    Procedure: Colonoscopy (CRSAL) with biopsies using regular forceps;  Surgeon: Caro Trevizo MD;  Location:  GI    DAVINCI HYSTERECTOMY TOTAL, BILATERAL SALPINGO-OOPHORECTOMY, COMBINED Bilateral 5/24/2023    Procedure: Robotic assisted supracervical hysterectomy, bilateral salpingo oophorectomy;  Surgeon: Nilo Oconnor MD;  Location: [facility] OR    DAVINCI RECTOPEXY N/A 5/24/2023    Procedure: Robotic ventral rectopexy with mesh and suture rectopexy;  Surgeon: Caro Trevizo MD;  Location: [facility] OR    DAVINCI SACROCOLPOPEXY, MIDURETHRAL SLING, CYSTOSCOPY N/A 5/24/2023    Procedure: Robotic assisted Sacrocolpopexy, Midurethral bladder Sling, Cystoscopy;  Surgeon: Armani Cronin MD;  Location:  OR    ORTHOPEDIC  REFERRAL  2016    Right rotator cuff repair    ORTHOPEDIC SURGERY  2010    Right knee replacement    ORTHOPEDIC SURGERY  2015    Left knee replacement    ORTHOPEDIC SURGERY  2012    Right knee replacement Redo    ORTHOPEDIC SURGERY  2011    Steel plate-right femur fractured    RECTAL SURGERY  2015       Current Meds:  Current Outpatient Medications   Medication Sig Dispense Refill    albuterol (PROAIR HFA/PROVENTIL HFA/VENTOLIN HFA) 108 (90 Base) MCG/ACT inhaler Inhale 2 puffs into the lungs every 6 hours as needed for shortness of breath / dyspnea or wheezing 18 g 0    calcium citrate (CITRACAL) 950 (200 Ca) MG tablet Take 1 tablet by mouth daily (with lunch)      cholecalciferol (VITAMIN D3) 25 mcg (1000 units) capsule Take 1 capsule by mouth daily (with dinner)      estradiol (ESTRACE) 0.1 MG/GM vaginal cream Pea size on a finger to the vagina Qhs 42 g 0    Multiple Vitamins-Minerals (OCUVITE PRESERVISION PO) Take 1 tablet by mouth daily (with dinner)      multivitamin, therapeutic (THERA-VIT) TABS tablet Take 1  tablet by mouth daily      Omega-3-6-9 CAPS Take 1 capsule by mouth daily      omeprazole (PRILOSEC) 40 MG DR capsule TAKE 1 CAPSULE BY MOUTH EVERY DAY 90 capsule 1    rosuvastatin (CRESTOR) 5 MG tablet Take 5 mg by mouth At Bedtime      traZODone (DESYREL) 100 MG tablet Take 100 mg by mouth At Bedtime      cephALEXin (KEFLEX) 500 MG capsule Take 500 mg by mouth 2 times daily           Allergies:  Allergies   Allergen Reactions    Fluticasone-Salmeterol      ADVAIR INHALER: Pt states her hair fell out taking this medication     Ciprofloxacin Diarrhea       Social Hx:  Marital status:   Number of children: 2, grandchildren 1   Resides in a townhouse [address] yr old, no concern for mold.  Occupational history: office work    service: no  Pets: dogs in the past, birds in the past   Smoking history: 0 pack year history  Alcohol use: none   Recreational drug use: none  Hobbies: quilt and paint  Recent Travel: none in the past 5 years, [place] for 20 years     Family HX: family history is not on file.    ROS:   ROS: 10-point review performed and notable for the above mentioned symptoms. The remainder reviewed and negative.     Physical Exam:  /82 (BP Location: Left arm, Patient Position: Sitting, Cuff Size: Adult Regular)   Pulse 82   Wt 87.4 kg (192 lb 9.6 oz)   SpO2 98%   BMI 32.05 kg/m      Physical Exam  Constitutional:       General: She is not in acute distress.     Appearance: She is not ill-appearing or diaphoretic.   HENT:      Nose: Nose normal.      Mouth/Throat:      Comments: Frequent throat clearing during visit  Cardiovascular:      Rate and Rhythm: Normal rate and regular rhythm.      Pulses: Normal pulses.      Heart sounds: Normal heart sounds.   Pulmonary:      Effort: Pulmonary effort is normal. No respiratory distress.      Breath sounds: No wheezing or rhonchi.      Comments: Harsh, dry cough during exam   Musculoskeletal:      Right lower leg: No edema.      Left lower leg: No  edema.   Skin:     General: Skin is warm and dry.      Findings: No rash.   Neurological:      Mental Status: She is alert.   Psychiatric:         Behavior: Behavior normal.       PFT's (12/9/22):       Impression:  Full Pulmonary Function Test is abnormal.  PFTs are consistent with  no  obstructive disease.  Spirometry is not consistent with reversibility.  There is no hyperinflation.  There is no air-trapping.  Diffusion capacity when corrected for hemoglobin is not reduced.    Labs:   personally reviewed in the EMR.     Latest Reference Range & Units 03/09/23 11:28   Allergen A alternata <0.10 KU(A)/L <0.10   Allergen A fumigatus <0.10 KU(A)/L <0.10   Allergen, [place] Grass <0.10 KU(A)/L <0.10   Allergen C herbarum <0.10 KU(A)/L <0.10   Allergen Cat Dander <0.10 KU(A)/L <0.10   Allergen Cockroach <0.10 KU(A)/L <0.10   Allergen D farinae <0.10 KU(A)/L <0.10   Allergen, D Pteronyssinus <0.10 KU(A)/L <0.10   Allergen Dog Dander <0.10 KU(A)/L <0.10   Allergen Elm <0.10 KU(A)/L <0.10   Allergen Maple <0.10 KU(A)/L <0.10   Allergen, Mtn [place] <0.10 KU(A)/L <0.10   Allergen Oak(white) <0.10 KU(A)/L <0.10   Allergen P notatum <0.10 KU(A)/L <0.10   Allergen Casey <0.10 KU(A)/L <0.10   Allergen Tree White [place]ven IgE <0.10 KU(A)/L <0.10   Allergen Weed Nettle IgE <0.10 KU(A)/L <0.10   Allergen Stacy <0.10 KU(A)/L <0.10   Allergen Marshelder <0.10 KU(A)/L <0.10   Allergen, Mouse Urine <0.10 KU(A)/L <0.10   Allergen, Ragweed Short <0.10 KU(A)/L <0.10   Allergen Russian Thistle <0.10 KU(A)/L <0.10   Allergen, Silver Birch <0.10 KU(A)/L <0.10   Allergen White Tj <0.10 KU(A)/L <0.10   IGE 0 - 114 kU/L  0 - 114 kU/L <2  <2       Imaging studies: personally reviewed and interpreted. Below are the Radiology interpretations.    CT CHEST W/O CONTRAST, DATE/TIME: 12/27/2022 12:45 PM  INDICATION: Follow up pneumonia.  COMPARISON: 10/06/2022  FINDINGS:   LUNGS AND PLEURA: Interval resolution of the patchy multifocal  groundglass, consolidative, and tree-in-bud opacities. Mild scattered atelectasis and/or scarring. No new focal consolidation or effusion. There are a few similar scattered pulmonary   micronodules with the largest measuring up to 4 mm in the left upper lobe (series 4/103 and 4/56). There are airway secretions. Mild bronchial wall thickening. No new focal consolidation. Mild mosaic attenuation of the lungs.                                                                IMPRESSION:   1.  Interval resolution of the patchy multifocal pulmonary opacities.  2.  Mild mosaic attenuation of the lungs can be seen with air trapping or small airways disease.  3.  Mild bronchial wall thickening.  4.  Similar scattered pulmonary micronodules measuring up to 4 mm. Follow-up guidelines as below.    CT CHEST PULMONARY EMBOLISM W CONTRAST, DATE/TIME: 10/6/2022   INDICATION: acute hypoxic respiratory failure, elevated D dimer  COMPARISON: None.  FINDINGS:  ANGIOGRAM CHEST: Limited due to respiratory motion. No central or definite peripheral pulmonary artery embolism. Thoracic aorta is negative for dissection. No CT evidence of right heart strain.  LUNGS AND PLEURA: Asymmetric elevation of the left hemidiaphragm. Mild tracheal secretions. Mosaic lung attenuation. Mild left lower lobar consolidation. Bilateral multilobar reticular nodular/tree-in-bud pulmonary opacities which are most pronounced   within the lower lobes. Associated scattered small nodular pulmonary opacities including a 13 mm right lower lobe nodule image 163 series 4. Bilateral multilobar bronchial wall thickening and endobronchial mucoid impaction. No pleural effusion.  MEDIASTINUM/AXILLAE: Mediastinal adenopathy. Normal heart size and no pericardial effusion.  CORONARY ARTERY CALCIFICATION: None.  UPPER ABDOMEN: Colonic diverticulosis.  MUSCULOSKELETAL: Spinal degenerative changes.                                                                IMPRESSION:  1.  No  pulmonary artery embolism.  2.  Findings consistent with bilateral multilobar bronchopneumonia most pronounced within the lung bases and perhaps reflecting aspiration. Recommend CT follow-up in approximately 4-8 weeks after treatment/symptom improvement to exclude underlying lung lesions. No pleural effusion.    CT CHEST W/O CONTRAST, DATE/TIME: 12/27/2022 12:45 PM  INDICATION: Follow up pneumonia.  COMPARISON: 10/06/2022  FINDINGS:   LUNGS AND PLEURA: Interval resolution of the patchy multifocal groundglass, consolidative, and tree-in-bud opacities. Mild scattered atelectasis and/or scarring. No new focal consolidation or effusion. There are a few similar scattered pulmonary   micronodules with the largest measuring up to 4 mm in the left upper lobe (series 4/103 and 4/56). There are airway secretions. Mild bronchial wall thickening. No new focal consolidation. Mild mosaic attenuation of the lungs.  MEDIASTINUM/AXILLAE: Heart size normal. No lymphadenopathy. No thoracic aortic aneurysm.                                                             IMPRESSION:   1.  Interval resolution of the patchy multifocal pulmonary opacities.  2.  Mild mosaic attenuation of the lungs can be seen with air trapping or small airways disease.  3.  Mild bronchial wall thickening.  4.  Similar scattered pulmonary micronodules measuring up to 4 mm. Follow-up guidelines as below.

## 2023-11-06 NOTE — PATIENT INSTRUCTIONS
- since the omeprazole was not helpful we can try a different nasal spray to see if it helps with the congestion.     If you have worsening symptoms, questions, or need to speak with the nurse please call [phone].

## 2023-11-07 NOTE — TELEPHONE ENCOUNTER
FUTURE VISIT INFORMATION      FUTURE VISIT INFORMATION:  Date: 1/30/2024  Time: 1 PM  [place] [facility] [place] INFORMATION:  Referring provider:  Elizabeth Saeed APRN CNP  Referring providers clinic:  [facility] PULMONOLOGY   Reason for visit/diagnosis  Chronic cough- Referred by Elizabeth Saeed APRN CNP in [facility] PULMONOLOG[facility]     RECORDS REQUESTED FROM:       Clinic name Comments Records Status Imaging Status   [facility] PULMONOLOGY  11/7/23 and 9/5/23 OV with Elizabeth Saeed APRN CNP    12/9/22 PFT Epic    [facility] CT chest 12/27/22  CT chest pulm 10/6/22  XR chest 10/3/22 Epic PACS

## 2023-11-28 DIAGNOSIS — R09.82 PND (POST-NASAL DRIP): ICD-10-CM

## 2023-11-28 DIAGNOSIS — R05.3 CHRONIC COUGH: ICD-10-CM

## 2023-11-28 RX ORDER — IPRATROPIUM BROMIDE 42 UG/1
2 SPRAY, METERED NASAL 4 TIMES DAILY
Qty: 45 ML | Refills: 1 | Status: SHIPPED | OUTPATIENT
Start: 2023-11-28

## 2023-12-27 ENCOUNTER — LAB REQUISITION (OUTPATIENT)
Dept: LAB | Facility: CLINIC | Age: 73
End: 2023-12-27
Payer: MEDICARE

## 2023-12-27 DIAGNOSIS — D75.89 OTHER SPECIFIED DISEASES OF BLOOD AND BLOOD-FORMING ORGANS: ICD-10-CM

## 2023-12-27 DIAGNOSIS — I10 ESSENTIAL (PRIMARY) HYPERTENSION: ICD-10-CM

## 2023-12-27 DIAGNOSIS — E78.2 MIXED HYPERLIPIDEMIA: ICD-10-CM

## 2023-12-27 DIAGNOSIS — R39.11 HESITANCY OF MICTURITION: ICD-10-CM

## 2023-12-27 LAB
ALBUMIN SERPL BCG-MCNC: 4.5 G/DL (ref 3.5–5.2)
ALP SERPL-CCNC: 72 U/L (ref 40–150)
ALT SERPL W P-5'-P-CCNC: 7 U/L (ref 0–50)
ANION GAP SERPL CALCULATED.3IONS-SCNC: 13 MMOL/L (ref 7–15)
AST SERPL W P-5'-P-CCNC: 22 U/L (ref 0–45)
BASOPHILS # BLD AUTO: 0.1 10E3/UL (ref 0–0.2)
BASOPHILS NFR BLD AUTO: 1 %
BILIRUB SERPL-MCNC: 0.3 MG/DL
BUN SERPL-MCNC: 14.1 MG/DL (ref 8–23)
CALCIUM SERPL-MCNC: 9.4 MG/DL (ref 8.8–10.2)
CHLORIDE SERPL-SCNC: 105 MMOL/L (ref 98–107)
CHOLEST SERPL-MCNC: 155 MG/DL
CREAT SERPL-MCNC: 0.8 MG/DL (ref 0.51–0.95)
DEPRECATED HCO3 PLAS-SCNC: 25 MMOL/L (ref 22–29)
EGFRCR SERPLBLD CKD-EPI 2021: 76 ML/MIN/1.73M2
EOSINOPHIL # BLD AUTO: 0 10E3/UL (ref 0–0.7)
EOSINOPHIL NFR BLD AUTO: 1 %
ERYTHROCYTE [DISTWIDTH] IN BLOOD BY AUTOMATED COUNT: 13.2 % (ref 10–15)
FASTING STATUS PATIENT QL REPORTED: NORMAL
GLUCOSE SERPL-MCNC: 107 MG/DL (ref 70–99)
HCT VFR BLD AUTO: 41.6 % (ref 35–47)
HDLC SERPL-MCNC: 63 MG/DL
HGB BLD-MCNC: 13.2 G/DL (ref 11.7–15.7)
IMM GRANULOCYTES # BLD: 0 10E3/UL
IMM GRANULOCYTES NFR BLD: 0 %
LDLC SERPL CALC-MCNC: 65 MG/DL
LYMPHOCYTES # BLD AUTO: 2 10E3/UL (ref 0.8–5.3)
LYMPHOCYTES NFR BLD AUTO: 32 %
MCH RBC QN AUTO: 30.9 PG (ref 26.5–33)
MCHC RBC AUTO-ENTMCNC: 31.7 G/DL (ref 31.5–36.5)
MCV RBC AUTO: 97 FL (ref 78–100)
MONOCYTES # BLD AUTO: 0.5 10E3/UL (ref 0–1.3)
MONOCYTES NFR BLD AUTO: 8 %
NEUTROPHILS # BLD AUTO: 3.7 10E3/UL (ref 1.6–8.3)
NEUTROPHILS NFR BLD AUTO: 58 %
NONHDLC SERPL-MCNC: 92 MG/DL
NRBC # BLD AUTO: 0 10E3/UL
NRBC BLD AUTO-RTO: 0 /100
PLATELET # BLD AUTO: 249 10E3/UL (ref 150–450)
POTASSIUM SERPL-SCNC: 3.9 MMOL/L (ref 3.4–5.3)
PROT SERPL-MCNC: 7.4 G/DL (ref 6.4–8.3)
RBC # BLD AUTO: 4.27 10E6/UL (ref 3.8–5.2)
SODIUM SERPL-SCNC: 143 MMOL/L (ref 135–145)
TRIGL SERPL-MCNC: 133 MG/DL
VIT B12 SERPL-MCNC: 449 PG/ML (ref 232–1245)
WBC # BLD AUTO: 6.4 10E3/UL (ref 4–11)

## 2023-12-27 PROCEDURE — 82607 VITAMIN B-12: CPT | Mod: ORL | Performed by: FAMILY MEDICINE

## 2023-12-27 PROCEDURE — 85025 COMPLETE CBC W/AUTO DIFF WBC: CPT | Mod: ORL | Performed by: FAMILY MEDICINE

## 2023-12-27 PROCEDURE — 87086 URINE CULTURE/COLONY COUNT: CPT | Mod: ORL | Performed by: FAMILY MEDICINE

## 2023-12-27 PROCEDURE — 80053 COMPREHEN METABOLIC PANEL: CPT | Mod: ORL | Performed by: FAMILY MEDICINE

## 2023-12-27 PROCEDURE — 80061 LIPID PANEL: CPT | Mod: ORL | Performed by: FAMILY MEDICINE

## 2023-12-29 LAB — BACTERIA UR CULT: NORMAL

## 2024-01-30 ENCOUNTER — THERAPY VISIT (OUTPATIENT)
Dept: SPEECH THERAPY | Facility: CLINIC | Age: 74
End: 2024-01-30
Payer: MEDICARE

## 2024-01-30 ENCOUNTER — PRE VISIT (OUTPATIENT)
Dept: OTOLARYNGOLOGY | Facility: CLINIC | Age: 74
End: 2024-01-30

## 2024-01-30 ENCOUNTER — OFFICE VISIT (OUTPATIENT)
Dept: OTOLARYNGOLOGY | Facility: CLINIC | Age: 74
End: 2024-01-30
Payer: MEDICARE

## 2024-01-30 VITALS
SYSTOLIC BLOOD PRESSURE: 159 MMHG | HEART RATE: 97 BPM | WEIGHT: 195 LBS | BODY MASS INDEX: 32.49 KG/M2 | DIASTOLIC BLOOD PRESSURE: 99 MMHG | HEIGHT: 65 IN

## 2024-01-30 DIAGNOSIS — R09.A2 GLOBUS SENSATION: ICD-10-CM

## 2024-01-30 DIAGNOSIS — R05.3 CHRONIC COUGH: Primary | ICD-10-CM

## 2024-01-30 DIAGNOSIS — J38.7 IRRITABLE LARYNX: ICD-10-CM

## 2024-01-30 DIAGNOSIS — R49.0 DYSPHONIA: ICD-10-CM

## 2024-01-30 DIAGNOSIS — R13.12 DYSPHAGIA, OROPHARYNGEAL PHASE: Primary | ICD-10-CM

## 2024-01-30 DIAGNOSIS — I10 ESSENTIAL HYPERTENSION: ICD-10-CM

## 2024-01-30 PROCEDURE — 92612 ENDOSCOPY SWALLOW (FEES) VID: CPT | Performed by: OTOLARYNGOLOGY

## 2024-01-30 PROCEDURE — 92524 BEHAVRAL QUALIT ANALYS VOICE: CPT | Mod: GN | Performed by: SPEECH-LANGUAGE PATHOLOGIST

## 2024-01-30 PROCEDURE — 92507 TX SP LANG VOICE COMM INDIV: CPT | Mod: GN | Performed by: SPEECH-LANGUAGE PATHOLOGIST

## 2024-01-30 PROCEDURE — 92610 EVALUATE SWALLOWING FUNCTION: CPT | Mod: GN | Performed by: SPEECH-LANGUAGE PATHOLOGIST

## 2024-01-30 PROCEDURE — 99204 OFFICE O/P NEW MOD 45 MIN: CPT | Mod: 25 | Performed by: OTOLARYNGOLOGY

## 2024-01-30 PROCEDURE — 92613 ENDOSCOPY SWALLOW (FEES) I&R: CPT | Performed by: OTOLARYNGOLOGY

## 2024-01-30 RX ORDER — FLUTICASONE PROPIONATE AND SALMETEROL XINAFOATE 230; 21 UG/1; UG/1
2 AEROSOL, METERED RESPIRATORY (INHALATION) 2 TIMES DAILY
COMMUNITY

## 2024-01-30 RX ORDER — PREDNISONE 10 MG/1
TABLET ORAL
COMMUNITY

## 2024-01-30 RX ORDER — CYCLOBENZAPRINE HCL 5 MG
TABLET ORAL
COMMUNITY
Start: 2024-01-25

## 2024-01-30 RX ORDER — NITROFURANTOIN 25; 75 MG/1; MG/1
CAPSULE ORAL
COMMUNITY
Start: 2023-10-31

## 2024-01-30 RX ORDER — AMLODIPINE BESYLATE 5 MG/1
1 TABLET ORAL DAILY
COMMUNITY

## 2024-01-30 RX ORDER — NYSTATIN 100000 [USP'U]/G
POWDER TOPICAL
COMMUNITY

## 2024-01-30 RX ORDER — METRONIDAZOLE 500 MG/1
TABLET ORAL
COMMUNITY

## 2024-01-30 RX ORDER — MUPIROCIN 20 MG/G
OINTMENT TOPICAL
COMMUNITY

## 2024-01-30 RX ORDER — ONDANSETRON 4 MG/1
TABLET, ORALLY DISINTEGRATING ORAL
COMMUNITY

## 2024-01-30 RX ORDER — CEPHALEXIN 500 MG/1
CAPSULE ORAL
COMMUNITY

## 2024-01-30 RX ORDER — CEFDINIR 300 MG/1
1 CAPSULE ORAL 2 TIMES DAILY
COMMUNITY

## 2024-01-30 RX ORDER — SULFAMETHOXAZOLE/TRIMETHOPRIM 800-160 MG
TABLET ORAL
COMMUNITY

## 2024-01-30 RX ORDER — PANTOPRAZOLE SODIUM 40 MG/1
1 TABLET, DELAYED RELEASE ORAL
COMMUNITY

## 2024-01-30 RX ORDER — FLUTICASONE PROPIONATE 50 MCG
1 SPRAY, SUSPENSION (ML) NASAL DAILY
COMMUNITY

## 2024-01-30 RX ORDER — RESPIRATORY SYNCYTIAL VISUS VACCINE RECOMBINANT, ADJUVANTED 120MCG/0.5
KIT INTRAMUSCULAR
COMMUNITY
Start: 2023-10-18

## 2024-01-30 ASSESSMENT — PAIN SCALES - GENERAL: PAINLEVEL: MODERATE PAIN (4)

## 2024-01-30 NOTE — LETTER
2024       RE: aNila Talley  [address]  [facility] [address]     Dear Colleague,    Thank you for referring your patient, Naila Talley, to the [facility] EAR NOSE AND THROAT [facility]INIC [place] at [facility]. Please see a copy of my visit note below.        [facility]   at the [facility]   Otolaryngology Clinic     Patient: Naila Talley    MRN: [id number]    : 1950    Age/Gender: 73 year old female  Date of Service: 2024  Rendering Provider:   Stephanie Doyle MD     Referring Provider   PCP: Danelle Adam  Referring Physician: Angelito Dowell MD  No address on file  Reason for Consultation   Chronic cough/throat clearing  History   HISTORY OF PRESENT ILLNESS: Ms. Talley is a 73 year old female who presents to us today with chronic cough and throat clearing.      she presents today for evaluation. she reports:    Throat clearing/cough    - still a talker  - did lose her voice when she was using her inhalers more often- more coughing with talking  - never had any therapy  - denies waking up at night from coughing or a sore throat  - more mucus and throat clearing in the morning  - change in the atmosphere and cold air triggers her coughing        PAST MEDICAL HISTORY:   Past Medical History:   Diagnosis Date     Fibromyalgia      Insomnia      Osteoarthritis      Osteopenia      Uncomplicated asthma        PAST SURGICAL HISTORY:   Past Surgical History:   Procedure Laterality Date     COLONOSCOPY N/A 2023    Procedure: Colonoscopy (CRSAL) with biopsies using regular forceps;  Surgeon: Caro Trevizo MD;  Location:  GI     DAVINCI HYSTERECTOMY TOTAL, BILATERAL SALPINGO-OOPHORECTOMY, COMBINED Bilateral 2023    Procedure: Robotic assisted supracervical hysterectomy, bilateral salpingo oophorectomy;  Surgeon: Nilo Oconnor MD;  Location:  OR     DAVINCI RECTOPEXY N/A 2023     Procedure: Robotic ventral rectopexy with mesh and suture rectopexy;  Surgeon: Caro Trevizo MD;  Location: [facility] OR     DAVINCI SACROCOLPOPEXY, MIDURETHRAL SLING, CYSTOSCOPY N/A 5/24/2023    Procedure: Robotic assisted Sacrocolpopexy, Midurethral bladder Sling, Cystoscopy;  Surgeon: Armani Cronin MD;  Location: [facility] OR     ORTHOPEDIC  REFERRAL  2016    Right rotator cuff repair     ORTHOPEDIC SURGERY  2010    Right knee replacement     ORTHOPEDIC SURGERY  2015    Left knee replacement     ORTHOPEDIC SURGERY  2012    Right knee replacement Redo     ORTHOPEDIC SURGERY  2011    Steel plate-right femur fractured     RECTAL SURGERY  2015       CURRENT MEDICATIONS:   Current Outpatient Medications:      albuterol (PROAIR HFA/PROVENTIL HFA/VENTOLIN HFA) 108 (90 Base) MCG/ACT inhaler, Inhale 2 puffs into the lungs every 6 hours as needed for shortness of breath / dyspnea or wheezing, Disp: 18 g, Rfl: 0     calcium citrate (CITRACAL) 950 (200 Ca) MG tablet, Take 1 tablet by mouth daily (with lunch), Disp: , Rfl:      cholecalciferol (VITAMIN D3) 25 mcg (1000 units) capsule, Take 1 capsule by mouth daily (with dinner), Disp: , Rfl:      estradiol (ESTRACE) 0.1 MG/GM vaginal cream, Pea size on a finger to the vagina Qhs, Disp: 42 g, Rfl: 0     ipratropium (ATROVENT) 0.06 % nasal spray, SPRAY 2 SPRAYS INTO BOTH NOSTRILS 4 TIMES DAILY, Disp: 45 mL, Rfl: 1     Multiple Vitamins-Minerals (OCUVITE PRESERVISION PO), Take 1 tablet by mouth daily (with dinner), Disp: , Rfl:      multivitamin, therapeutic (THERA-VIT) TABS tablet, Take 1 tablet by mouth daily, Disp: , Rfl:      Omega-3-6-9 CAPS, Take 1 capsule by mouth daily, Disp: , Rfl:      omeprazole (PRILOSEC) 40 MG DR capsule, TAKE 1 CAPSULE BY MOUTH EVERY DAY, Disp: 90 capsule, Rfl: 1     rosuvastatin (CRESTOR) 5 MG tablet, Take 5 mg by mouth At Bedtime, Disp: , Rfl:      traZODone (DESYREL) 100 MG tablet, Take 100 mg by mouth At Bedtime, Disp: , Rfl:      ALLERGIES: Fluticasone-salmeterol and Ciprofloxacin    SOCIAL HISTORY:    Social History     Socioeconomic History     Marital status:      Spouse name: Not on file     Number of children: Not on file     Years of education: Not on file     Highest education level: Not on file   Occupational History     Not on file   Tobacco Use     Smoking status: Never     Smokeless tobacco: Never   Vaping Use     Vaping Use: Never used   Substance and Sexual Activity     Alcohol use: Never     Drug use: Never     Sexual activity: Not on file   Other Topics Concern     Not on file   Social History Narrative     Not on file     Social Determinants of Health     Financial Resource Strain: Not on file   Food Insecurity: Not on file   Transportation Needs: Not on file   Physical Activity: Not on file   Stress: Not on file   Social Connections: Not on file   Interpersonal Safety: Not on file   Housing Stability: Not on file         FAMILY HISTORY:   Family History   Problem Relation Age of Onset     Colon Cancer No family hx of      Non-contributory for problems with anesthesia    REVIEW OF SYSTEMS:   The patient was asked a 14 point review of systems regarding constitutional symptoms, eye symptoms, ears, nose, mouth, throat symptoms, cardiovascular symptoms, respiratory symptoms, gastrointestinal symptoms, genitourinary symptoms, musculoskeletal symptoms, integumentary symptoms, neurological symptoms, psychiatric symptoms, endocrine symptoms, hematologic/lymphatic symptoms, and allergic/ immunologic symptoms.   The pertinent factors have been included in the HPI and below.  Patient Supplied Answers to Review of Systems       No data to display                Physical Examination   The patient underwent a physical examination as described below. The pertinent positive and negative findings are summarized after the description of the examination.  Constitutional: The patient's developmental and nutritional status was  assessed. The patient's voice quality was assessed.  Head and Face: The head and face were inspected for deformities. The sinuses were palpated. The salivary glands were palpated. Facial muscle strength was assessed bilaterally.  Eyes: Extraocular movements and primary gaze alignment were assessed.  Ears, Nose, Mouth and Throat: The ears and nose were examined for deformities. The nasal septum, mucosa, and turbinates were inspected by anterior rhinoscopy. The lips, teeth, and gums were examined for abnormalities. The oral mucosa, tongue, palate, tonsils, lateral and posterior pharynx were inspected for the presence of asymmetry or mucosal lesions.    Neck: The tracheal position was noted, and the neck mass palpated to determine if there were any asymmetries, abnormal neck masses, thyromegally, or thyroid nodules.  Respiratory: The nature of the breathing and chest expansion/symmetry was observed.  Cardiovascular: The patient was examined to determine the presence of any edema or jugular venous distension.  Abdomen: The contour of the abdomen was noted.  Lymphatic: The patient was examined for infraclavicular lymphadenopathy.  Musculoskeletal: The patient was inspected for the presence of skeletal deformities.  Extremities: The extremities were examined for any clubbing or cyanosis.  Skin: The skin was examined for inflammatory or neoplastic conditions.  Neurologic: The patient's orientation, mood, and affect were noted. The cranial nerve  functions were examined.  Other pertinent positive and negative findings on physical examination:      OC/OP: no lesions, uvula midline, soft palate elevates symmetrically   Neck: no lesions, no TH tenderness to palpation  All other physical examination findings were within normal limits and noncontributory.  Procedures     Flexible laryngoscopy (CPT 88187)      Pre-procedure diagnosis: dysphonia  Post-procedure diagnosis: same as above  Indication for procedure: Ms. Talley is a  73 year old female with see above  Procedure(s): Fiberoptic Laryngoscopy    Details of Procedure: After informed consent was obtained, the patient was seated in the examination chair.  The areas of the nasopharynx as well as the hypopharynx were anesthetized with topical 4% lidocaine with 0.25% phenylephrine atomizer.  Examination of the base of tongue was performed first.  Attention was directed to any evidence of masses in the area or evidence of leukoplakia or candidal infection.  Attention was directed to the epiglottis where its size and position was determined and its movement on phonation of the vowel  e .  The piriform sinuses were then inspected for any mass lesions or pooling of secretions.  Attention was then directed to the larynx. The vocal folds were inspected for infection or any areas of leukoplakia, for masses, polypoid degeneration, or hemorrhage.  Having done this, the arytenoids and vocal processes were inspected for erythema or evidence of granuloma formation.  The posterior commissure was then inspected for evidence of inflammatory changes in the mucosa and heaping up of mucosal tissue. The patient was then instructed to say the vowel  e .  Adduction of vocal folds to the midline was observed for any evidence of paresis or paralysis of the larynx or asymmetry in rotation of the larynx to the left or right. The patient was asked to breathe and the degree of abduction was noted bilaterally.  Subglottic view of the larynx was obtained for any additional mass lesions or mucosal changes.  Finally the post cricoid was examined for evidence of pooling of secretions, as well as the pharyngeal wall mucosa.   Anesthesia type: 0.25% phenylephrine    Findings:  Anatomic/physiological deviations: LNC, presbylarynx, no pooling of saliva   Right vocal process: No restriction of mobility   Left vocal process: No restriction of mobility  Glottal gap: Complete glottal closure  Supraglottic structures:  Normal  Hypopharynx: Normal     Estimated Blood Loss: minimal  Complications: None  Disposition: Patient tolerated the procedure well           Fiberoptic Endoscopic Evaluation of Swallowing (CPT 42075)  and Interpretation of Swallowing (CPT 34259)    Indications: See above notes for complete history and physical. Patient complains of dysphagia to both solids and liquids and/or there is suggestion on history and endoscopic exam of the presence of dysphagia causing medical complaints.  Swallowing evaluation is being performed to assess the presence and degree of dysphagia, and to recommend a safe diet.     Pulmonary Status:  No PNA   Current Diet:              regular                                        thin liquids      Consistency Amounts:  Thin Liquid: sip   Puree: sip  Solid: cookies            Positioning: upright in a chair  Oral Peripheral Exam: See physical exam section.  Anatomic Notes: See Videostroboscopy report for assessment of anatomy and laryngeal functioning  Pharyngeal secretions prior to administration of liquid or food: No   Oral Phase Abnormal Findings: No abnormal behavior observed   Pharyngeal Phase Abnormal Findings: no penetration, no aspiration , felt foods stuck though none seen on exam    Recommended Diet:  regular                                        thin liquids             Review of Relevant Clinical Data   I personally reviewed:  Notes: Dr. Elizabeth Saeed, 12/6/23,Pulmonology  Assessment and Plan:   Naila Talley is a 73 year old female with history of asthma and hyperlipidemia, presenting today for hospital follow up for her cough.      #. Reported history asthma -- Symptom pattern does not support this diagnosis. Recent CT does show some evidence of small airway disease or air trapping. IgE and [place] allergy panel WNL. No change in cough while on ICS/LABA. She has tried different inhalers many times in the past with minimal change in symptoms. PFTs are normal and show no  "significant bronchodilator response. Last FeNO was 16 ppb.   Could consider methacholine challenge in the future for official confirmation.   #. Chronic cough -- The cough continues to be dry and harsh. Questionable ENT or reflux cause. frequent throat clearing and post nasal drip. No reflux symptoms. Trial of omeprazole for 2 months did not change cough.   ENT referral. Visit scheduled for January.   Trial of Atrovent nasal spray   Could consider referral to [facility] chronic cough clinic   #. Pneumonia secondary to rhinovirus -- resolved on CT on 12/27/22, minimal bronchial wall thickening and some suggestion of small airway disease. Small micro nodules noted are not of concern as she was a never smoker.     Radiology: CT chest 12/27/22  IMPRESSION:   1.  Interval resolution of the patchy multifocal pulmonary opacities.  2.  Mild mosaic attenuation of the lungs can be seen with air trapping or small airways disease.  3.  Mild bronchial wall thickening.  4.  Similar scattered pulmonary micronodules measuring up to 4 mm      Chest xray 10/3/22  IMPRESSION: Linear and ill-defined patchy opacities throughout the mid and lower lungs compatible with pneumonia in the proper clinical setting. No pleural fusion. Shallow inspiration and mild elevation left hemidiaphragm exaggerate heart size and   pulmonary vascularity which are probably within normal limits.    Procedures: PFTs 12/9/22  Impression:  Full Pulmonary Function Test is normal.  PFTs are consistent with no obstructive disease.   Spirometry is not consistent with reversibility.   There  is not  hyperinflation.   There  is not  air-trapping.   Diffusion capacity when corrected for hemoglobin is normal.   Labs:  No results found for: \"TSH\"  Lab Results   Component Value Date     12/27/2023    CO2 25 12/27/2023    BUN 14.1 12/27/2023    PHOS 3.5 05/25/2023     Lab Results   Component Value Date    WBC 6.4 12/27/2023    HGB 13.2 12/27/2023    HCT 41.6 12/27/2023 " "   MCV 97 12/27/2023     12/27/2023     No results found for: \"PT\", \"PTT\", \"INR\"  No results found for: \"LYLE\"  No components found for: \"RHEUMATOIDFACTOR\", \"RF\"  No results found for: \"CRP\"  No components found for: \"CKTOT\", \"URICACID\"  No components found for: \"C3\", \"C4\", \"DSDNAAB\", \"NDNAABIFA\"  No results found for: \"MPOAB\"    Patient reported Quality of Life (QOL) Measures   Patient Supplied Answers To VHI Questionnaire       No data to display                  Patient Supplied Answers To EAT Questionnaire       No data to display                  Patient Supplied Answers To CSI Questionnaire       No data to display                  Patient Supplied Answers to Dyspnea Index Questionnaire:       No data to display                Impression & Plan     IMPRESSION: Ms. Talley is a 73 year old female who is being seen for the following:    Chronic cough/throat clearing    - started 20 years ago   - has stayed the same overtime  - has both coughing and throat clearing  - has associated sensation of mucus and globus  - loses her voice sometimes  - no history of cigarettes usage  - allergy triggers and work up: cold air, change in atmosphere, post-nasal drip  - pulmonary triggers and work up: PFTs 2022 - normal, CT chest 2022 normal, has used multiple inhalers in the past, diagnosed with childhood asthma  - GI triggers and work up: trialed reflux meds in the past, does not wake up at night with coughing, does not have morning sore throat  - ENT triggers and work up: more mucus in the morning, tried nasal sprays in the past  - scope shows presbylarynx, no pooling of saliva  - FEES show no penetration, no aspiration , felt foods stuck though none seen on exam  - symptoms likely due to irritable larynx syndrome and muscle tension dysphonia, likely also has an element of esophageal dysphagia, however will focus on the first two etiologies first  Plan  - cough/throat clearing suppression therapy   - voice " therapy      RETURN VISIT: as needed after therapy    Thank you for the kind referral and for allowing me to share in the care of Ms. Talley. If you have any questions, please do not hesitate to contact me.    Scribe Disclosure:   I, Mini Nathalia, am serving as a scribe; to document services personally performed by Stephanie Doyle MD -based on data collection and the provider's statements to me.     Provider Disclosure:  I agree with above History, Review of Systems, Physical exam and Plan.  I have reviewed the content of the documentation and have edited it as needed. I have personally performed the services documented here and the documentation accurately represents those services and the decisions I have made.        Stephanie Doyle MD    Laryngology    [facility]  Department of  Otolaryngology - Head and Neck Surgery  [facility] [place] [address]  Appointment line: [phone]  Fax: [phone]  [email or web address]        Again, thank you for allowing me to participate in the care of your patient.      Sincerely,    Stephanie Doyle MD

## 2024-01-30 NOTE — PATIENT INSTRUCTIONS
1.  You were seen in the ENT Clinic today by . If you have any questions or concerns after your appointment, please call [phone]. Press option #1 for scheduling related needs. Press option #3 for Nurse advice.    2.   has recommended  the following:   - voice therapy   - cough/throat clearing suppression therapy    3.  Plan is to return to clinic as needed after therapy      Theodora Suarez LPN  [phone]  [facility]

## 2024-01-30 NOTE — PROGRESS NOTES
[facility]  [facility] - Dept. of Otolaryngology   Evaluation report - IN PERSON APPOINTMENT    Clinician: Christel Pride M.M. (voice), M.A., CCC-SLP  Seen in conjunction with: Dr. Stephanie Doyle  Referring physician:  Self  Patient: Naila Talley  Date of Visit: 1/30/2024    HISTORY    Chief complaint: Gerri is a 73 year old presenting today for evaluation of voice and globus sensation.      Salient history: She has a history significant for globus sensation, and asthma.    CURRENT SYMPTOMS INCLUDE  VOICE  Onset: serveral years, decades  Self-described talker   Little more gravelly  Looses her voice with an inhaler - lost hair with advair    THROAT ISSUES:   Mostly dry, but can sometimes feel chunky  Feels it in the high chest.   Will have coughing spells and ten sneezes  GLOBUS:  Yes  Always feels like there is something in the back of the throat that is coming up  Flonase  Has used saline - no gargles - not a cure all  Several years of a cough that starts and stops  Feels that it contributed to her pelvic floor issues  Lung dr  - no resolution.  20+ years  Has gotten worse.   Triggers - cold air, change of atmosphere  Frequent throat clearing  RESPIRATION:   Asthma -told by a GP when she was a little girl  Airway was small - asthmatic.  No sleep apnea    SWALLOWING:   No coughing while eating - has not noticed.  No liquids or food  Some liquids and hot tea helps  No food avoidance for difficulty swallowing  Omeprazole  -made her sick. Does not feel that she has reflux  Rarely feels heart burn  No waking in the middle of the night coughing  No bad tastes in the mought    ADDITIONAL:  Reflux: Omeprazole 40 mg daily    OTHER PERTINENT HISTORY    Past Medical History:   Diagnosis Date    Fibromyalgia     Insomnia     Osteoarthritis     Osteopenia     Uncomplicated asthma      Past Surgical History:   Procedure Laterality Date    COLONOSCOPY N/A 05/23/2023    Procedure: Colonoscopy (CRSAL) with  biopsies using regular forceps;  Surgeon: Caro Trevizo MD;  Location:  GI    DAVINCI HYSTERECTOMY TOTAL, BILATERAL SALPINGO-OOPHORECTOMY, COMBINED Bilateral 5/24/2023    Procedure: Robotic assisted supracervical hysterectomy, bilateral salpingo oophorectomy;  Surgeon: Nilo Oconnor MD;  Location: [facility] OR    DAVINCI RECTOPEXY N/A 5/24/2023    Procedure: Robotic ventral rectopexy with mesh and suture rectopexy;  Surgeon: Caro Trevizo MD;  Location: [facility] OR    DAVINCI SACROCOLPOPEXY, MIDURETHRAL SLING, CYSTOSCOPY N/A 5/24/2023    Procedure: Robotic assisted Sacrocolpopexy, Midurethral bladder Sling, Cystoscopy;  Surgeon: Armani Cronin MD;  Location:  OR    ORTHOPEDIC  REFERRAL  2016    Right rotator cuff repair    ORTHOPEDIC SURGERY  2010    Right knee replacement    ORTHOPEDIC SURGERY  2015    Left knee replacement    ORTHOPEDIC SURGERY  2012    Right knee replacement Redo    ORTHOPEDIC SURGERY  2011    Steel plate-right femur fractured    RECTAL SURGERY  2015       OBJECTIVE  PATIENT REPORTED MEASURES    Patient Supplied Answers To VHI Questionnaire       No data to display                Patient Supplied Answers To CSI Questionnaire       No data to display                Patient Supplied Answers To EAT Questionnaire       No data to display                PERCEPTUAL EVALUATION (CPT 71719)  POSTURE / TENSION/ PALPATION OF THE LARYNGEAL AREA:   upper body    BREATHING:   appears within normal limits and adequate     LARYNGEAL PALPATION:   firm musculature    VOICE:  Gerri states that today is a typical voice today, with clinician observing voice quality to be characterized as:  Roughness: Mild  Breathiness: WNL  Strain: WNL  Pitch:  F0/Habitual/Conversational speech:  mild  Resonance:   Conversational speech:  laryngeal pharyngeal resonance  Loudness  Conversational speech:  WNL  GLOBAL ASSESSMENT OF DYSPHONIA: 15/100    COUGH/THROAT CLEARING:  Not observed    LARYNGEAL  FUNCTION STUDIES (CPT 38246)  N/a      LARYNGEAL EXAMINATION  Procedure: Flexible endoscopy with chip-tip technology without stroboscopy, left nostril; topical anesthesia with 2% lidocaine and 0.025% oxymetazoline was sprayed into the nasal cavity and allowed 3 to 5 minutes for effect.  Performed by: Dr. Doyle  The laryngeal and pharyngeal structures were evaluated for gross appearance, mobility, function, and focal lesions / abnormalities of the associated mucosa.    Stroboscopy was warranted to evaluate closure, symmetry, and vibratory characteristics of the vocal folds.  All findings were within normal limits with the exception of the following salient features:     This exam shows the following:    Posterior commissure: No remarkable inflammation.  Secretions: Minimal    Movement:  Right Vocal Fold: Normal  Left Vocal Fold: Normal  Supraglottic hyperfunction during connected speech tasks: Mild to moderate    Glottic Closure: Mildly weak   Upper Airway: Patent    Vocal Fold Findings:  Right Vocal Fold: Mild bowing  Left Vocal Fold: Mild bowing, slightly greater than right true vocal fold            The laryngeal exam was reviewed with Ms. Talley, and I provided pertinent explanations, as well as written and oral information.         ASSESSMENT / PLAN  IMPRESSIONS: Naila Talley is a 73 year old, presenting today with Globus Sensation (R09.89), Laryngeal Hyperfunction (J38.7), and Dysphonia (R49.0), as evidenced by evaluation the results of the evaluation and the laryngeal exam.    Remarkable findings included:  Perceptual evaluation demonstrated:   Roughness: Mild  Breathiness: WNL  Strain: WNL  Pitch:  F0/Habitual/Conversational speech:  mild  Laryngeal exam demonstrated: Essentially healthy larynx with mild bilateral bowing, and secondary supraglottic hyperfunction    Primary complaint of patient today included: Globus sensation and dysphonia    Therefore, we recommended a course of speech therapy to address  these concerns.      STIMULABILITY: results of therapy probes during perceptual and laryngeal evaluation demonstrate improvement with use of forward resonant stimuli, coordination of respiration and phonation, and use of glottic coup to promote vocal fold closure    RECOMMENDATIONS:   A course of speech therapy is recommended to optimize vocal technique, improve voice quality, and promote reduced discomfort, effort and fatigue.  She demonstrates a Good prognosis for improvement given adherence to therapeutic recommendations.   Positive indicators: positive response to therapy probes diagnosis is known to respond to treatment high level of comittment  Negative indicators: none  Research: This patient is willing to be contacted about participation in research. May be eligible for cough and MTD study.    DURATION / FREQUENCY: 4 one-hour sessions.  A total of 6-8 sessions may be necessary.    GOALS:  Patient goal:   To improve and maintain a healthy voice quality  To understand the problem and fix it as much as possible  To have a normal and acceptable voice quality    Short-term goal(s): Within the first 4 sessions, Ms. Talley:  will demonstrate assigned laryngeal massage techniques with 80% accuracy or better with no clinician support  will utilize silent inhalation with good low-respiratory engagement 75% of the time during therapy tasks with minimal clinician support  will demonstrate semi-occluded vocal tract (SOVT) exercises with at least 80% accuracy with no clinician support    Long-term goal(s): In 6 months, Ms. Talley will:  Report resolution of dysphonia, and use of optimal voice quality to meet personal, social, and professional needs, 90% of the time during a typical week of vocal activities  Report resolution of symptoms, and use of optimal voice quality and comfort to meet personal, social, and professional needs, 90% of the time during a typical week of vocal activities    Certification period:    Certification date from: 1/30/24  Certification date to: 4/29/24    This treatment plan was developed with the patient who agreed with the recommendations.    _______________________________________________________________________  THERAPY NOTE (CPT 40519)  Date of Service: 1/30/2024    SUBJECTIVE / OBJECTIVE:  Please refer to my evaluation report from today's encounter for full details regarding subjective data, patient reported measures, and diagnostic findings.    THERAPEUTIC ACTIVITIES  Exercises and techniques for optimal vocal hygiene including:  Systemic hydration, including strategies for increasing daily water intake  Topical hydration - Gargling (saline and plain water), saline nasal irrigation, humidification, steam, guaifenesin to reduce the thickened secretions / laryngeal irritation.    Counseling and Education  Asked many questions about the nature of her symptoms, and I answered all of these thoroughly.  Concepts of an optimal regimen for practice were instructed.  She should use an interval schedule of practice, with brief periods of practice frequently throughout each day  [place] concepts of volitional practice to facilitate motor learning.  A revised regimen for home practice was instructed.  I provided an audio recording and handouts of today's therapeutic activities to facilitate practice.    ASSESSMENT/PLAN  PROGRESS TOWARD LONG TERM GOALS:   Adequate progress; please see above    IMPRESSIONS:  Globus Sensation (R09.89), irritable larynx syndrome, Laryngeal Hyperfunction (J38.7), and Dysphonia (R49.0). Ms. Talley demonstrated good learning of early strategies to help reduce     PLAN: I will see Ms. Talley in April to work with my colleague, Amanda Webster.     TOTAL SERVICE TIME: 60 minutes  EVALUATION OF VOICE AND RESONANCE (59600)  TREATMENT (05321)  NO CHARGE FACILITY FEE (46563)      Christel Pride M.M. (voice), M.A., CCC/SLP  Speech-Language Pathologist  FRANCES Trained  Vocologist  [facility]  [phone]  [email or web address]  Pronouns: she/her      *this report was created in part through the use of computerized dictation software, and though reviewed following completion, some typographic errors may persist.  If there is confusion regarding any of this notes contents, please contact me for clarification

## 2024-01-30 NOTE — PATIENT INSTRUCTIONS
Who you met today:  Christel Pride M.M. (voice), M.A., CCC-SLP  Speech-Language Pathologist  FRANCES Trained Vocologist  [facility]  she/her  [email or web address]  MyChart is best communication        Irritable Larynx Syndrome    What is Irritable Larynx Syndrome?  Irritable Larynx Syndrome (ILS) is a cluster of symptoms not associated with a specific disease process, but felt to be related to the sensitivity / reactivity of the larynx (voice box) and surrounding structures. It can frequently present alongside other issues such as voice, swallowing, or even breathing changes. Individuals with ILS can have any combination of the following complaints:        Chronic cough  Chronic throat clearing or persistent sensation of mucus in the throat  Globus sensation (feeling of lump or some other sensation in the throat)  Throat discomfort, irritation, or burning sensation  Paradoxical vocal fold motion (sensation of difficulty inhaling)  Laryngospasm (tightening of throat causing choking or difficulty inhaling)    What causes Irritable Larynx Syndrome?  The reason why ILS develops for some individuals and not others is unknown; however, we know that it is usually fed by a vicious cycle of irritation. Chronic irritation of the surface tissue in the larynx and throat can cause changes to the nerves; making them hyper-sensitive.  As the cycle progresses, even a small irritation like a bit of dust or strong fragrance can cause a large response (like a cough or breathing difficulty). It's nice that the nervous system can learn, but in this case it has backfired!  To put it another way, ILS works similarly to a mosquito bite: We might scratch the bite absentmindedly a couple of times, and suddenly we realize the bite really itches!  So we scratch it vigorously because that feels better for a moment, but in the long run, scratching can actually irritate the skin and can make it itch more.  In  the case of ILS someone may experience an irritation in their throat and not even realize they have begun to  scratch the itch  by clearing their throat, but over time the irritation becomes worse and more noticeable.    As symptoms progress individuals with ILS may be triggered by any number of things including:   Smoke / cigarettes  Certain foods (such as acidity or strong flavors)  Talking   Strong smells / cleaning chemicals  Strong emotions  Changes in temperature    How is Irritable Larynx Syndrome Evaluated?    ILS symptoms are often multifactorial. Our larynx lives at the crossroads of breathing, voice, and swallowing in our body, and so evaluating and treating irritation that contributes to Irritable Larynx Syndrome may also involve other disciplines of medicine beyond ENT. Lung, Allergy, and Gastroenterology (GI) may all need to be involved to give you the care you need.    At the [facility], a laryngologist and a speech-language pathologist work as a team to determine if ILS is a problem.  Medical evaluation and laryngeal examination rule out any other laryngeal causes for the symptoms.  Functional evaluation determines whether there are behavioral or lifestyle factors that are contributing to the symptoms.      How is Irritable Larynx Syndrome Treated?  Treatment of ILS addresses the cause of irritation. This can include:   Treatment of Acid Reflux This may include a trial of medication recommended by the physician, and or a referral to Gastroenterology (GI)  Functional Speech Therapy with a Speech-Language Pathologist (SLP). Your SLP will educate you about the disorder, help you adjust your external environment, take care of your internal environment, and optimize patterns in how you use your larynx to reduce irritation and restore balanced function. It may sound mysterious, but it's remarkably effective!  Further Medical treatment - as previously mentioned further evaluation is sometimes needed  to identify or rule out other issues contributing to the irritation.  Your MD will discuss these possibilities with you if they are relevant.      So I should never cough?    Coughing serves an important biological function of keeping things out of our airway that don't belong there. Addressing Irritable larynx doesn't mean you aren't allowed to cough, or even clear your throat again.  The work you do with your speech pathologist will help you become more aware of when a cough is needed and when it is not.  You'll learn techniques to help address the urge, and suppress the cough or otherwise do so with less trauma to the tissue.  Although the techniques themselves may seem simple, it is the guidance and feedback you get during your sessions that will make them a powerful tool toward addressing ILS.             Muscle Tension Dysphonia    One of the most common voice disorders we treat at the [facility] is Muscle Tension Dysphonia (MTD).  Although this may sound dramatic, it is just a medically specific way of saying that the sound of a person's voice is negatively affected by the way the muscles involved in voicing are working together.  It is what is known as a functional disorder, meaning that there is nothing structurally wrong with a person's larynx.  Rather the muscles and systems which allow us to breathe, voice, and shape that sound are out of balance. For many of us, not only does our voice play a large role in our daily lives, but it is part of our sense of self, so the overall impact of MTD cannot be overstated.  Symptoms of Muscle Tension Dysphonia  Individuals experience MTD in very different ways including:  Effects on overall voice quality  patients describe their voice as rough, hoarse, gravelly, raspy, breathy, hollow, backward, shaky, halting, pitch changes, etc  Effects on daily function  Increased effort to use voice, reduced endurance, voice breaks / cutting out, decreased loudness, total  loss of voice  Changes to singing voice  Loss of range, rapid fatigue, loss of vocal agility, decreased ease even with  easy  repertoire  Other effects on the throat  Throat discomfort or pain, frequent throat clearing, neck tenderness, sensation of a lump in the throat     Causes of Muscle Tension Dysphonia  There are many specific, individual reasons why use of the vocal mechanism becomes abnormal.  Some common causes are prolonged illness, longstanding patterns of overuse, and trauma, such as a physical injury, chemical exposure, or an emotionally traumatic event. Often the inciting event causes a person to adapt how they are using their voice temporarily, and even when the initial event is resolved these patterns persist, taking on a life of their own, yielding problems as listed above.     The onset of MTD can also be very subtle. Small amounts of added effort to achieve what feels like a stronger or clearer voice contributes to a vicious cycle in which more and more effort is required.  This cycle may continue for months or even years before the individual becomes aware that there is a problem with the voice.    The reality is, most of the time when we see people in clinic, the onset of their voice issue is long in the past or otherwise unclear. As a result we are often not able to say for certain how these patterns came to be, but thankfully that does not make their treatment any less effective.    Diagnosing Muscle Tension Dysphonia  A thorough evaluation is required in order to diagnose MTD. This involves skilled clinicians listening to a patient's voice across a variety of tasks, visually evaluating the structure and function of the larynx via nasal endoscopy, and may also utilize aerodynamic and acoustic assessment.   Among other factors the laryngologist and speech language pathologist are looking for patterns of muscular involvement beyond what is needed for clear sound. This frequently results in a  squeezed or compressed appearance limiting the view of the vocal folds, often corresponding to the patient's experience of vocal effort.  Treatment of Muscle Tension Dysphonia  The primary treatment for MTD is functional voice therapy, and in nearly all cases it is the only form of treatment a patient will need. The amount of time required to complete functional therapy averages between 4 and 8 sessions, though each individual is unique, and their journey toward vocal health may require more or less time. In cases of emotional stress, counseling or stress management may be helpful or even necessary.    On very rare occasions other medical interventions may be warranted. However, these are never used as a first line of intervention, and are best discussed between patient, speech pathologist, and laryngologist when and if the need arises.         Gargle:  Using Saline  (optional)  morning and night   OR  With plain water (room temp or warm) AM, PM AND AFTER MEALS  Throughout the day (2-3x/day, johann. after meals, or when having increased throat irritation symptoms).  To help reduce throat irritation, the feeling of mucus in the throat, improve topical hydration and to reduce throat clearing, coughing.  Saline Recipe: For garglin-8 oz glass   warm water   tap or distilled (your preference)  feel for right temperature (not too hot or cold)  warm enough to dissolve the salt  1/4 tsp. kosher salt, or sea salt   Additives in table salt can cause irritation/ discomfort.  1/4 tsp. baking soda  (OR alternate salt and soda with one saline packet) - [employer] is a common brand found in the sinus aisle.        To help suppress nighttime or daytime cough

## 2024-01-30 NOTE — PROGRESS NOTES
Outpatient Speech Language Therapy Evaluation  PLAN OF TREATMENT FOR OUTPATIENT REHABILITATION  (COMPLETE FOR INITIAL CLAIMS ONLY)  Patient's Last Name, First Name, M.I.  YOB: 1950  Naila Talley                        Provider's Name  Christel Pride, SLP Medical Record No.  [id number]                               Onset Date:  1/30/24   Start of Care Date: 1/30/24     Type: Speech Language Therapy Medical Diagnosis: No primary diagnosis found.                        Therapy Diagnosis:  No primary diagnosis found.   Visits from SOC:  1   _________________________________________________________________________________  Plan of Treatment:   Speech therapy    DURATION/FREQUENCY OF TREATMENT  Six weekly, one-hour sessions, with two monthly one-hour follow-up sessions    PROGNOSIS  Good prognosis for voice improvement with speech therapy and regular practice of therapeutic activities.    BARRIERS TO LEARNING/TEACHING AND LEARNING NEEDS  None/Unremarkable    GOALS:  Patient goal:   To improve and maintain a healthy voice quality  To understand the problem and fix it as much as possible  To have a normal and acceptable voice quality    Short-term goal(s): Within the first 4 sessions, Ms. Talley:  will demonstrate assigned laryngeal massage techniques with 80% accuracy or better with no clinician support  will utilize silent inhalation with good low-respiratory engagement 75% of the time during therapy tasks with minimal clinician support  will demonstrate semi-occluded vocal tract (SOVT) exercises with at least 80% accuracy with no clinician support    Long-term goal(s): In 6 months, Ms. Talley will:  Report resolution of dysphonia, and use of optimal voice quality to meet personal, social, and professional needs, 90% of the time during a typical week of vocal activities  Report resolution of symptoms, and use of  optimal voice quality and comfort to meet personal, social, and professional needs, 90% of the time during a typical week of vocal activities  _________________________________________________________________________________    I CERTIFY THE NEED FOR THESE SERVICES FURNISHED UNDER        THIS PLAN OF TREATMENT AND WHILE UNDER MY CARE     (Physician attestation of this document indicates review and certification of the therapy plan).     Certification date from: 1/30/24  Certification date to: 4/29/24    Referring Provider: Vivek

## 2024-01-30 NOTE — PROGRESS NOTES
SPEECH LANGUAGE PATHOLOGY EVALUATION  Clinical Swallow Evaluation visualized under endoscopy    See electronic medical record for Abuse and Falls Screening details.    Subjective      Presenting condition or subjective complaint:   Pt is a 73 year old female that was seen today in the ENT clinic. Pt is being seen for chronic cough. Pt reports that it has been going on for over 20 years. She denies any association of coughing to eating. She eats a regular diet with thin liquids. She does not avoid any foods.   Date of onset:      Relevant medical history:     Dates & types of surgery:      Prior diagnostic imaging/testing results:       Prior therapy history for the same diagnosis, illness or injury:        Living Environment  Social support:     Help at home:    Equipment owned:       Employment:      Hobbies/Interests:      Patient goals for therapy:      Pain assessment: Pain denied     Objective     SWALLOW EVALUTION visualized under endoscopy performed by MD  Dysphagia history: No history of dysphagia  Current Diet/Method of Nutritional Intake: thin liquids (level 0), regular diet        CLINICAL SWALLOW EVALUATION  Oral Motor Function: generally intact   Oral Motor Function: Dentition: adequate dentition  Secretion management: WFL  Mucosal quality: adequate  Mandibular function: intact  Oral labial function: WFL  Lingual function: WFL  Velar function: intact   Buccal function: intact  Laryngeal function: cough, throat clear, volitional swallow, voicing WFL     Level of assist required for feeding: no assistance needed   Textures Trialed:   Clinical Swallow Eval: Thin Liquids  Mode of presentation: straw   Volume presented: 4oz  Preparatory Phase: WFL  Oral Phase: WFL  Pharyngeal phase of swallow: intact   Diagnostic statement: PO trials evaluated under endoscopy completed by MD. No penetration/aspiration or significant residuals.     Clinical Swallow Eval: Purees  Mode of presentation: spoon   Volume presented:  3 tablespoons  Preparatory Phase: WFL  Oral Phase: WFL  Pharyngeal phase of swallow: intact   Diagnostic statement: PO trials evaluated under endoscopy completed by MD. No penetration/aspiration or significant residuals.     Clinical Swallow Eval: Solids  Mode of presentation: self-fed   Volume presented: Chris yarbrough  Preparatory Phase: WFL  Oral Phase: WFL  Pharyngeal phase of swallow: intact   Diagnostic statement: PO trials evaluated under endoscopy completed by MD. No penetration/aspiration or significant residuals.     ADDITIONAL EVAL COMPLETED TODAY : none    ESOPHAGEAL PHASE OF SWALLOW  no observed or reported concerns related to esophageal function     SWALLOW ASSESSMENT CLINICAL IMPRESSIONS AND RATIONALE  Diet Consistency Recommendations: thin liquids (level 0), regular diet    Recommended Feeding/Eating Techniques: slow rate of intake, alternate food and liquid intake   Medication Administration Recommendations: pills with water  Instrumental Assessment Recommendations:  none      Assessment & Plan   CLINICAL IMPRESSIONS   Medical Diagnosis:      Treatment Diagnosis:     Impression/Assessment: Overall, pt presents with a safe, functional oropharyngeal swallow. Oral motor assessment demonstrates adequate lingual, labial and buccal strength. Oral phase demonstrates adequate AP transit of bolus and adequate bolus manipulation. Pharyngeal phase demonstrates a timely swallow response, adequate base of tongue retraction and pharyngeal constriction. No penetration or aspiration was observed today on any trials. Pt did not have significant pharyngeal residue that would indicate weakness. Recommend Pt continue with a regular diet with thin liquids. Pt was educated on the anatomy and the results of the swallow assessment. Pt was educated on recommendations and agrees with the plan of care.        PLAN OF CARE  Treatment Interventions:  Eval only      Recommended Referrals to Other Professionals:  none  Education  Assessment:        Risks and benefits of evaluation/treatment have been explained.   Patient/Family/caregiver agrees with Plan of Care.     Evaluation Time:              Signing Clinician: Ktahie SANTANA [facility]                                                                                   OUTPATIENT SPEECH LANGUAGE PATHOLOGY

## 2024-01-30 NOTE — PROGRESS NOTES
[facility]   at the [facility]   Otolaryngology Clinic     Patient: Naila Talley    MRN: [id number]    : 1950    Age/Gender: 73 year old female  Date of Service: 2024  Rendering Provider:   Stephanie Doyle MD     Referring Provider   PCP: Danelle Adam  Referring Physician: Angelito Dowell MD  No address on file  Reason for Consultation   Chronic cough/throat clearing  History   HISTORY OF PRESENT ILLNESS: Ms. Talley is a 73 year old female who presents to us today with chronic cough and throat clearing.      she presents today for evaluation. she reports:    Throat clearing/cough    - still a talker  - did lose her voice when she was using her inhalers more often- more coughing with talking  - never had any therapy  - denies waking up at night from coughing or a sore throat  - more mucus and throat clearing in the morning  - change in the atmosphere and cold air triggers her coughing        PAST MEDICAL HISTORY:   Past Medical History:   Diagnosis Date    Fibromyalgia     Insomnia     Osteoarthritis     Osteopenia     Uncomplicated asthma        PAST SURGICAL HISTORY:   Past Surgical History:   Procedure Laterality Date    COLONOSCOPY N/A 2023    Procedure: Colonoscopy (CRSAL) with biopsies using regular forceps;  Surgeon: Caro Trevizo MD;  [facility], BILATERAL SALPINGO-OOPHORECTOMY, COMBINED Bilateral 2023    Procedure: Robotic assisted supracervical hysterectomy, bilateral salpingo oophorectomy;  Surgeon: Nilo Oconnor MD;  Location:  OR    DAVINCI RECTOPEXY N/A 2023    Procedure: Robotic ventral rectopexy with mesh and suture rectopexy;  Surgeon: Caro Trevizo MD;  Location:  OR    DAVINCI SACROCOLPOPEXY, MIDURETHRAL SLING, CYSTOSCOPY N/A 2023    Procedure: Robotic assisted Sacrocolpopexy, Midurethral bladder Sling, Cystoscopy;  Surgeon: Armani Cronin MD;  Location:  OR     ORTHOPEDIC  REFERRAL  2016    Right rotator cuff repair    ORTHOPEDIC SURGERY  2010    Right knee replacement    ORTHOPEDIC SURGERY  2015    Left knee replacement    ORTHOPEDIC SURGERY  2012    Right knee replacement Redo    ORTHOPEDIC SURGERY  2011    Steel plate-right femur fractured    RECTAL SURGERY  2015       CURRENT MEDICATIONS:   Current Outpatient Medications:     albuterol (PROAIR HFA/PROVENTIL HFA/VENTOLIN HFA) 108 (90 Base) MCG/ACT inhaler, Inhale 2 puffs into the lungs every 6 hours as needed for shortness of breath / dyspnea or wheezing, Disp: 18 g, Rfl: 0    calcium citrate (CITRACAL) 950 (200 Ca) MG tablet, Take 1 tablet by mouth daily (with lunch), Disp: , Rfl:     cholecalciferol (VITAMIN D3) 25 mcg (1000 units) capsule, Take 1 capsule by mouth daily (with dinner), Disp: , Rfl:     estradiol (ESTRACE) 0.1 MG/GM vaginal cream, Pea size on a finger to the vagina Qhs, Disp: 42 g, Rfl: 0    ipratropium (ATROVENT) 0.06 % nasal spray, SPRAY 2 SPRAYS INTO BOTH NOSTRILS 4 TIMES DAILY, Disp: 45 mL, Rfl: 1    Multiple Vitamins-Minerals (OCUVITE PRESERVISION PO), Take 1 tablet by mouth daily (with dinner), Disp: , Rfl:     multivitamin, therapeutic (THERA-VIT) TABS tablet, Take 1 tablet by mouth daily, Disp: , Rfl:     Omega-3-6-9 CAPS, Take 1 capsule by mouth daily, Disp: , Rfl:     omeprazole (PRILOSEC) 40 MG DR capsule, TAKE 1 CAPSULE BY MOUTH EVERY DAY, Disp: 90 capsule, Rfl: 1    rosuvastatin (CRESTOR) 5 MG tablet, Take 5 mg by mouth At Bedtime, Disp: , Rfl:     traZODone (DESYREL) 100 MG tablet, Take 100 mg by mouth At Bedtime, Disp: , Rfl:     ALLERGIES: Fluticasone-salmeterol and Ciprofloxacin    SOCIAL HISTORY:    Social History     Socioeconomic History    Marital status:      Spouse name: Not on file    Number of children: Not on file    Years of education: Not on file    Highest education level: Not on file   Occupational History    Not on file   Tobacco Use    Smoking status:  Never    Smokeless tobacco: Never   Vaping Use    Vaping Use: Never used   Substance and Sexual Activity    Alcohol use: Never    Drug use: Never    Sexual activity: Not on file   Other Topics Concern    Not on file   Social History Narrative    Not on file     Social Determinants of Health     Financial Resource Strain: Not on file   Food Insecurity: Not on file   Transportation Needs: Not on file   Physical Activity: Not on file   Stress: Not on file   Social Connections: Not on file   Interpersonal Safety: Not on file   Housing Stability: Not on file         FAMILY HISTORY:   Family History   Problem Relation Age of Onset    Colon Cancer No family hx of      Non-contributory for problems with anesthesia    REVIEW OF SYSTEMS:   The patient was asked a 14 point review of systems regarding constitutional symptoms, eye symptoms, ears, nose, mouth, throat symptoms, cardiovascular symptoms, respiratory symptoms, gastrointestinal symptoms, genitourinary symptoms, musculoskeletal symptoms, integumentary symptoms, neurological symptoms, psychiatric symptoms, endocrine symptoms, hematologic/lymphatic symptoms, and allergic/ immunologic symptoms.   The pertinent factors have been included in the HPI and below.  Patient Supplied Answers to Review of Systems       No data to display                Physical Examination   The patient underwent a physical examination as described below. The pertinent positive and negative findings are summarized after the description of the examination.  Constitutional: The patient's developmental and nutritional status was assessed. The patient's voice quality was assessed.  Head and Face: The head and face were inspected for deformities. The sinuses were palpated. The salivary glands were palpated. Facial muscle strength was assessed bilaterally.  Eyes: Extraocular movements and primary gaze alignment were assessed.  Ears, Nose, Mouth and Throat: The ears and nose were examined for  deformities. The nasal septum, mucosa, and turbinates were inspected by anterior rhinoscopy. The lips, teeth, and gums were examined for abnormalities. The oral mucosa, tongue, palate, tonsils, lateral and posterior pharynx were inspected for the presence of asymmetry or mucosal lesions.    Neck: The tracheal position was noted, and the neck mass palpated to determine if there were any asymmetries, abnormal neck masses, thyromegally, or thyroid nodules.  Respiratory: The nature of the breathing and chest expansion/symmetry was observed.  Cardiovascular: The patient was examined to determine the presence of any edema or jugular venous distension.  Abdomen: The contour of the abdomen was noted.  Lymphatic: The patient was examined for infraclavicular lymphadenopathy.  Musculoskeletal: The patient was inspected for the presence of skeletal deformities.  Extremities: The extremities were examined for any clubbing or cyanosis.  Skin: The skin was examined for inflammatory or neoplastic conditions.  Neurologic: The patient's orientation, mood, and affect were noted. The cranial nerve  functions were examined.  Other pertinent positive and negative findings on physical examination:      OC/OP: no lesions, uvula midline, soft palate elevates symmetrically   Neck: no lesions, no TH tenderness to palpation  All other physical examination findings were within normal limits and noncontributory.  Procedures     Flexible laryngoscopy (CPT 01143)      Pre-procedure diagnosis: dysphonia  Post-procedure diagnosis: same as above  Indication for procedure: Ms. Talley is a 73 year old female with see above  Procedure(s): Fiberoptic Laryngoscopy    Details of Procedure: After informed consent was obtained, the patient was seated in the examination chair.  The areas of the nasopharynx as well as the hypopharynx were anesthetized with topical 4% lidocaine with 0.25% phenylephrine atomizer.  Examination of the base of tongue was  performed first.  Attention was directed to any evidence of masses in the area or evidence of leukoplakia or candidal infection.  Attention was directed to the epiglottis where its size and position was determined and its movement on phonation of the vowel  e .  The piriform sinuses were then inspected for any mass lesions or pooling of secretions.  Attention was then directed to the larynx. The vocal folds were inspected for infection or any areas of leukoplakia, for masses, polypoid degeneration, or hemorrhage.  Having done this, the arytenoids and vocal processes were inspected for erythema or evidence of granuloma formation.  The posterior commissure was then inspected for evidence of inflammatory changes in the mucosa and heaping up of mucosal tissue. The patient was then instructed to say the vowel  e .  Adduction of vocal folds to the midline was observed for any evidence of paresis or paralysis of the larynx or asymmetry in rotation of the larynx to the left or right. The patient was asked to breathe and the degree of abduction was noted bilaterally.  Subglottic view of the larynx was obtained for any additional mass lesions or mucosal changes.  Finally the post cricoid was examined for evidence of pooling of secretions, as well as the pharyngeal wall mucosa.   Anesthesia type: 0.25% phenylephrine    Findings:  Anatomic/physiological deviations: LNC, presbylarynx, no pooling of saliva   Right vocal process: No restriction of mobility   Left vocal process: No restriction of mobility  Glottal gap: Complete glottal closure  Supraglottic structures: Normal  Hypopharynx: Normal     Estimated Blood Loss: minimal  Complications: None  Disposition: Patient tolerated the procedure well           Fiberoptic Endoscopic Evaluation of Swallowing (CPT 45850)  and Interpretation of Swallowing (CPT 54926)    Indications: See above notes for complete history and physical. Patient complains of dysphagia to both solids and  liquids and/or there is suggestion on history and endoscopic exam of the presence of dysphagia causing medical complaints.  Swallowing evaluation is being performed to assess the presence and degree of dysphagia, and to recommend a safe diet.     Pulmonary Status:  No PNA   Current Diet:              regular                                        thin liquids      Consistency Amounts:  Thin Liquid: sip   Puree: sip  Solid: cookies            Positioning: upright in a chair  Oral Peripheral Exam: See physical exam section.  Anatomic Notes: See Videostroboscopy report for assessment of anatomy and laryngeal functioning  Pharyngeal secretions prior to administration of liquid or food: No   Oral Phase Abnormal Findings: No abnormal behavior observed   Pharyngeal Phase Abnormal Findings: no penetration, no aspiration , felt foods stuck though none seen on exam    Recommended Diet:  regular                                        thin liquids             Review of Relevant Clinical Data   I personally reviewed:  Notes: Dr. Elizabeth Saeed, 12/6/23,Pulmonology  Assessment and Plan:   Naila Talley is a 73 year old female with history of asthma and hyperlipidemia, presenting today for hospital follow up for her cough.      #. Reported history asthma -- Symptom pattern does not support this diagnosis. Recent CT does show some evidence of small airway disease or air trapping. IgE and [place] allergy panel WNL. No change in cough while on ICS/LABA. She has tried different inhalers many times in the past with minimal change in symptoms. PFTs are normal and show no significant bronchodilator response. Last FeNO was 16 ppb.   Could consider methacholine challenge in the future for official confirmation.   #. Chronic cough -- The cough continues to be dry and harsh. Questionable ENT or reflux cause. frequent throat clearing and post nasal drip. No reflux symptoms. Trial of omeprazole for 2 months did not change cough.   ENT  "referral. Visit scheduled for January.   Trial of Atrovent nasal spray   [facility]   #. Pneumonia secondary to rhinovirus -- resolved on CT on 12/27/22, minimal bronchial wall thickening and some suggestion of small airway disease. Small micro nodules noted are not of concern as she was a never smoker.     Radiology: CT chest 12/27/22  IMPRESSION:   1.  Interval resolution of the patchy multifocal pulmonary opacities.  2.  Mild mosaic attenuation of the lungs can be seen with air trapping or small airways disease.  3.  Mild bronchial wall thickening.  4.  Similar scattered pulmonary micronodules measuring up to 4 mm      Chest xray 10/3/22  IMPRESSION: Linear and ill-defined patchy opacities throughout the mid and lower lungs compatible with pneumonia in the proper clinical setting. No pleural fusion. Shallow inspiration and mild elevation left hemidiaphragm exaggerate heart size and   pulmonary vascularity which are probably within normal limits.    Procedures: PFTs 12/9/22  Impression:  Full Pulmonary Function Test is normal.  PFTs are consistent with no obstructive disease.   Spirometry is not consistent with reversibility.   There  is not  hyperinflation.   There  is not  air-trapping.   Diffusion capacity when corrected for hemoglobin is normal.   Labs:  No results found for: \"TSH\"  Lab Results   Component Value Date     12/27/2023    CO2 25 12/27/2023    BUN 14.1 12/27/2023    PHOS 3.5 05/25/2023     Lab Results   Component Value Date    WBC 6.4 12/27/2023    HGB 13.2 12/27/2023    HCT 41.6 12/27/2023    MCV 97 12/27/2023     12/27/2023     No results found for: \"PT\", \"PTT\", \"INR\"  No results found for: \"LYLE\"  No components found for: \"RHEUMATOIDFACTOR\", \"RF\"  No results found for: \"CRP\"  No components found for: \"CKTOT\", \"URICACID\"  No components found for: \"C3\", \"C4\", \"DSDNAAB\", \"NDNAABIFA\"  No results found for: \"MPOAB\"    Patient reported Quality of " Life (QOL) Measures   Patient Supplied Answers To VHI Questionnaire       No data to display                  Patient Supplied Answers To EAT Questionnaire       No data to display                  Patient Supplied Answers To CSI Questionnaire       No data to display                  Patient Supplied Answers to Dyspnea Index Questionnaire:       No data to display                Impression & Plan     IMPRESSION: Ms. Talley is a 73 year old female who is being seen for the following:    Chronic cough/throat clearing    - started 20 years ago   - has stayed the same overtime  - has both coughing and throat clearing  - has associated sensation of mucus and globus  - loses her voice sometimes  - no history of cigarettes usage  - allergy triggers and work up: cold air, change in atmosphere, post-nasal drip  - pulmonary triggers and work up: PFTs 2022 - normal, CT chest 2022 normal, has used multiple inhalers in the past, diagnosed with childhood asthma  - GI triggers and work up: trialed reflux meds in the past, does not wake up at night with coughing, does not have morning sore throat  - ENT triggers and work up: more mucus in the morning, tried nasal sprays in the past  - scope shows presbylarynx, no pooling of saliva  - FEES show no penetration, no aspiration , felt foods stuck though none seen on exam  - symptoms likely due to irritable larynx syndrome and muscle tension dysphonia, likely also has an element of esophageal dysphagia, however will focus on the first two etiologies first  Plan  - cough/throat clearing suppression therapy   - voice therapy      RETURN VISIT: as needed after therapy    Thank you for the kind referral and for allowing me to share in the care of Ms. Talley. If you have any questions, please do not hesitate to contact me.    Scribe Disclosure:   I, Mini Sloan, am serving as a scribe; to document services personally performed by Stephanie Doyle MD -based on data collection and the  provider's statements to me.     Provider Disclosure:  I agree with above History, Review of Systems, Physical exam and Plan.  I have reviewed the content of the documentation and have edited it as needed. I have personally performed the services documented here and the documentation accurately represents those services and the decisions I have made.        Stephanie Doyle MD    Laryngology    [facility]  Department of  Otolaryngology - Head and Neck Surgery  [facility] [place] [address]  Appointment line: [phone]  Fax: [phone]  [email or web address]

## 2024-04-04 ENCOUNTER — LAB REQUISITION (OUTPATIENT)
Dept: LAB | Facility: CLINIC | Age: 74
End: 2024-04-04
Payer: MEDICARE

## 2024-04-04 DIAGNOSIS — R30.0 DYSURIA: ICD-10-CM

## 2024-04-04 PROCEDURE — 87086 URINE CULTURE/COLONY COUNT: CPT | Mod: ORL | Performed by: NURSE PRACTITIONER

## 2024-04-06 LAB — BACTERIA UR CULT: NORMAL

## 2024-04-22 ENCOUNTER — OFFICE VISIT (OUTPATIENT)
Dept: OTOLARYNGOLOGY | Facility: CLINIC | Age: 74
End: 2024-04-22
Payer: MEDICARE

## 2024-04-22 DIAGNOSIS — J38.7 LARYNGEAL HYPERFUNCTION: ICD-10-CM

## 2024-04-22 DIAGNOSIS — R09.A2 GLOBUS SENSATION: ICD-10-CM

## 2024-04-22 DIAGNOSIS — R49.0 DYSPHONIA: Primary | ICD-10-CM

## 2024-04-22 PROCEDURE — 92507 TX SP LANG VOICE COMM INDIV: CPT | Mod: GN | Performed by: SPEECH-LANGUAGE PATHOLOGIST

## 2024-04-22 NOTE — PATIENT INSTRUCTIONS
"    0.9% or Isotonic saline      Topical hydration with humidifiers  Nebulized isotonic saline  Isotonic saline (0.9%) nasal spray  Use a humidifier at night in your bedroom and/or during the day in frequently used rooms  Nasal saline rinse: \"Dio Med\" or neti pot  Throat lozenges  Pectin-based preferred: PraneethsNickolas's Breezers  Sugar-free candies  Gum  Wet fruits: grapes, watermelon, etc.  These help promote more saliva production and make you swallow more  Avoid menthol!      Cough suppression techniques    Consecutive hard swallows  Take a sip of water if your mouth is dry  Make sure your swallowing is effortful, like you're swallowing something dry or solid  Start at the first sign that you need to cough or throat clear (e.g. throat tickle, mucus, something stuck)  Keep swallowing until you feel like the urge to cough / throat clear has passed - often about 3 - 4 times    \"Gnosticist throat clear\"  Very light throat clearing to dislodge mucus  Like you're whispering \"eh eh eh\"    Calming breathing  Inhale slowly through your nose or through rounded lips  Exhale slowly on a \"shhhhhh\" or pulsed \"sh sh sh\"    Sips of very hot, very cold, carbonated, flavored liquid: this moves our body's attention away from our cough urge in our throat to our mouth    " 100% of the time

## 2024-04-22 NOTE — LETTER
4/22/2024       RE: Naila Talley  [address]  [facility] [address]     Dear Colleague,    Thank you for referring your patient, Naila Talley, to the [facility] VOICE CLINIC [place] at [facility]. Please see a copy of my visit note below.    [facility]  VOICE / UPPER AIRWAY TREATMENT NOTE (CPT [id number])      Patient's name: Naila Talley  Date of Session: 4/22/2024  Providing SLP: Amanda Webster MS CCC-SLP  Referring Provider: Stephanie Doyle MD  [facility]  Total # of SLP Visits: 2  # of SLP Therapy Sessions: 2  Session Location: [facility]      Impressions from most recent evaluation on 1/30/24 by Christel Pride MM MA CCC-SLP:   Naila Talley is a 73 year old, presenting today with Globus Sensation (R09.89), Laryngeal Hyperfunction (J38.7), and Dysphonia (R49.0), as evidenced by evaluation the results of the evaluation and the laryngeal exam.    Remarkable findings included:  Perceptual evaluation demonstrated:   Roughness: Mild  Breathiness: WNL  Strain: WNL  Pitch:  F0/Habitual/Conversational speech:  mild  Laryngeal exam demonstrated: Essentially healthy larynx with mild bilateral bowing, and secondary supraglottic hyperfunction     Primary complaint of patient today included: Globus sensation and dysphonia     Therefore, we recommended a course of speech therapy to address these concerns.        STIMULABILITY: results of therapy probes during perceptual and laryngeal evaluation demonstrate improvement with use of forward resonant stimuli, coordination of respiration and phonation, and use of glottic coup to promote vocal fold closure     RECOMMENDATIONS:   A course of speech therapy is recommended to optimize vocal technique, improve voice quality, and promote reduced discomfort, effort and fatigue.  She demonstrates a Good prognosis for improvement given  adherence to therapeutic recommendations.   Positive indicators: positive response to therapy probes diagnosis is known to respond to treatment high level of comittment  Negative indicators: none  Research: This patient is willing to be contacted about participation in research. May be eligible for cough and MTD study.         SUBJECTIVE:  Coughing  Stable  Sometimes attacks or a couple times here and there  Sometimes accompanied by sneezing attacks  More throat clearing than coughing, particularly today  Sensation is globus and also mucus/swelling near the uvula  Was trying to gargle after meals and denies benefit/unable to do it without aspirating  Likes Ricola sugar-free lemon cough drops  Has very subtle scent sensation  Cold air a trigger      OBJECTIVE/ASSESSMENT:    Patient Supplied Answers To Last 2 CSI Questionnaires      4/22/2024     2:02 PM   Cough Severity Index (CSI)   My cough is worse when I lie down 0   My coughing problem causes me to restrict my personal and social life 0   I tend to avoid places because of my cough problem 2   I feel embarrassed because of my coughing problem 4   People ask, ''What's wrong?'' because I cough a lot 2   I run out of air when I cough 2   My coughing problem affects my voice 2   My coughing problem limits my physical activity 2   My coughing problem upsets me 3   People ask me if I am sick because I cough a lot 2   CSI Score 19       THERAPEUTIC ACTIVITIES:  Vocal hygiene concepts were discussed with the patient to optimize vocal health. Systemic and topical hydration was emphasized. It has been recommended that she incorporate nasal saline irrigation to aid with topical hydration and flush mucus from the velopharyngeal port. Naila reports intake of multiple menthol cough drops daily, and it was recommended they switch to pectin-based lozenges.     Cough and throat clearing reduction strategies are a behavioral treatment to replace chronic coughing/throat clearing  behaviors with multiple effortful swallows, coughing and throat clearing without phonation, and rescue breathing, thus reducing laryngeal hypersensitivity. These exercises were instructed today, and Naila performed them with high accuracy with maximal clinician cueing and modeling. She found the effortful swallows with water to be most helpful but not completely resolving her throat sensations. Rescue breathing was also helpful, followed by slight improvement with the Christian throat clear      IMPRESSIONS:   Globus Sensation (R09.89), Laryngeal Hyperfunction (J38.7), and Dysphonia (R49.0)     Naila had a productive session of voice therapy today. She had some success with cough suppression strategies, primarily effortful swallows with water and rescue breathing. She is looking forward to incorporating these and gradually reducing laryngeal hypersensitivity. Naila will also incorporate non-menthol cough drops and nasal saline irrigation. We plan on addressing laryngeal hyperfunction (e.g. SOVT exercises, circumlaryngeal massage) in our next sessions       PLAN:  Naila will adhere to vocal hygiene recommendations, particularly nasal saline irrigation, continued systemic hydration, and non-menthol cough drops  Naila will utilize cough suppression strategies as needed between sessions  Written instructions were provided to aid home programming via [email or web address] and printed AVS  Naila continues to demonstrate adequate progress toward long- and short-term goals.  Continued voice therapy services are recommended. Naila will follow-up for additional sessions on 5/9/24. Current goals will continue to be addressed.  Naila is in agreement with this plan of care.      SLP PLAN:  Voice SLP presence at next appointment with laryngologist (e.g. Dr. Doyle, Dr. Allen, Dr. Grimes) is unknown at this time, as she is just beginning a course of voice therapy. Next scheduled MD appointment is N/A.      BILLING SUMMARY:  Total treatment time: 53  minutes from 2:05 - 2:58 PM(including 8 minutes of chart review and preparation, interpretation of testing and therapeutic maneuvers, and document writing)  Speech Pathology Treatment ([id number])      MEDICARE CERTIFICATION DATES:  24 - 24  Re-certification will take place at her next therapy appointment on 24      Amanda Webster MS CCC-SLP  Speech-Language Pathologist  [facility]  Department of Otolaryngology - Head and Neck Surgery  [facility]  [email or web address]  Direct: [phone]  [phone]    *This note may have been completed using [id number] dictation software, so errors may exist. Please contact me for clarification if needed*      Again, thank you for allowing me to participate in the care of your patient.      Sincerely,    Amanda Webster, SLP

## 2024-04-22 NOTE — PROGRESS NOTES
[facility] VOICE CLINIC  VOICE / UPPER AIRWAY TREATMENT NOTE (CPT [id number])      Patient's name: Naila Talley  Date of Session: 4/22/2024  Providing SLP: Amanda Webster MS CCC-SLP  Referring Provider: Stephanie Doyle MD  [facility]  Total # of SLP Visits: 2  # of SLP Therapy Sessions: 2  Session Location: [facility]      Impressions from most recent evaluation on 1/30/24 by Christel Pride MM MA CCC-SLP:   Naila Talley is a 73 year old, presenting today with Globus Sensation (R09.89), Laryngeal Hyperfunction (J38.7), and Dysphonia (R49.0), as evidenced by evaluation the results of the evaluation and the laryngeal exam.    Remarkable findings included:  Perceptual evaluation demonstrated:   Roughness: Mild  Breathiness: WNL  Strain: WNL  Pitch:  F0/Habitual/Conversational speech:  mild  Laryngeal exam demonstrated: Essentially healthy larynx with mild bilateral bowing, and secondary supraglottic hyperfunction     Primary complaint of patient today included: Globus sensation and dysphonia     Therefore, we recommended a course of speech therapy to address these concerns.        STIMULABILITY: results of therapy probes during perceptual and laryngeal evaluation demonstrate improvement with use of forward resonant stimuli, coordination of respiration and phonation, and use of glottic coup to promote vocal fold closure     RECOMMENDATIONS:   A course of speech therapy is recommended to optimize vocal technique, improve voice quality, and promote reduced discomfort, effort and fatigue.  She demonstrates a Good prognosis for improvement given adherence to therapeutic recommendations.   Positive indicators: positive response to therapy probes diagnosis is known to respond to treatment high level of comittment  Negative indicators: none  Research: This patient is willing to be contacted about participation in research. May be eligible for cough and MTD  study.         SUBJECTIVE:  Coughing  Stable  Sometimes attacks or a couple times here and there  Sometimes accompanied by sneezing attacks  More throat clearing than coughing, particularly today  Sensation is globus and also mucus/swelling near the uvula  Was trying to gargle after meals and denies benefit/unable to do it without aspirating  Likes Ricola sugar-free lemon cough drops  Has very subtle scent sensation  Cold air a trigger      OBJECTIVE/ASSESSMENT:    Patient Supplied Answers To Last 2 CSI Questionnaires      4/22/2024     2:02 PM   Cough Severity Index (CSI)   My cough is worse when I lie down 0   My coughing problem causes me to restrict my personal and social life 0   I tend to avoid places because of my cough problem 2   I feel embarrassed because of my coughing problem 4   People ask, ''What's wrong?'' because I cough a lot 2   I run out of air when I cough 2   My coughing problem affects my voice 2   My coughing problem limits my physical activity 2   My coughing problem upsets me 3   People ask me if I am sick because I cough a lot 2   CSI Score 19       THERAPEUTIC ACTIVITIES:  Vocal hygiene concepts were discussed with the patient to optimize vocal health. Systemic and topical hydration was emphasized. It has been recommended that she incorporate nasal saline irrigation to aid with topical hydration and flush mucus from the velopharyngeal port. Naila reports intake of multiple menthol cough drops daily, and it was recommended they switch to pectin-based lozenges.     Cough and throat clearing reduction strategies are a behavioral treatment to replace chronic coughing/throat clearing behaviors with multiple effortful swallows, coughing and throat clearing without phonation, and rescue breathing, thus reducing laryngeal hypersensitivity. These exercises were instructed today, and Naila performed them with high accuracy with maximal clinician cueing and modeling. She found the effortful swallows  with water to be most helpful but not completely resolving her throat sensations. Rescue breathing was also helpful, followed by slight improvement with the Anabaptist throat clear      IMPRESSIONS:   Globus Sensation (R09.89), Laryngeal Hyperfunction (J38.7), and Dysphonia (R49.0)     Naila had a productive session of voice therapy today. She had some success with cough suppression strategies, primarily effortful swallows with water and rescue breathing. She is looking forward to incorporating these and gradually reducing laryngeal hypersensitivity. Naila will also incorporate non-menthol cough drops and nasal saline irrigation. We plan on addressing laryngeal hyperfunction (e.g. SOVT exercises, circumlaryngeal massage) in our next sessions       PLAN:  Naila will adhere to vocal hygiene recommendations, particularly nasal saline irrigation, continued systemic hydration, and non-menthol cough drops  Naila will utilize cough suppression strategies as needed between sessions  Written instructions were provided to aid home programming via [employer] and printed AVS  Naila continues to demonstrate adequate progress toward long- and short-term goals.  Continued voice therapy services are recommended. Naila will follow-up for additional sessions on 5/9/24. Current goals will continue to be addressed.  Naila is in agreement with this plan of care.      SLP PLAN:  Voice SLP presence at next appointment with laryngologist (e.g. Dr. Doyle, Dr. Allen, Dr. Grimes) is unknown at this time, as she is just beginning a course of voice therapy. Next scheduled MD appointment is N/A.      BILLING SUMMARY:  Total treatment time: 53 minutes from 2:05 - 2:58 PM(including 8 minutes of chart review and preparation, interpretation of testing and therapeutic maneuvers, and document writing)  Speech Pathology Treatment ([id number])      MEDICARE CERTIFICATION DATES:  1/30/24 - 4/29/24  Re-certification will take place at her next therapy appointment on  24      Amanda Webster MS CCC-SLP  Speech-Language Pathologist  [facility]  Department of Otolaryngology - Head and Neck Surgery  [facility]  [email or web address]  Direct: [phone]  [phone]    *This note may have been completed using zxfowf-fz-rhud dictation software, so errors may exist. Please contact me for clarification if needed*

## 2024-05-09 ENCOUNTER — OFFICE VISIT (OUTPATIENT)
Dept: OTOLARYNGOLOGY | Facility: CLINIC | Age: 74
End: 2024-05-09
Payer: MEDICARE

## 2024-05-09 DIAGNOSIS — R49.0 DYSPHONIA: Primary | ICD-10-CM

## 2024-05-09 DIAGNOSIS — R09.A2 GLOBUS SENSATION: ICD-10-CM

## 2024-05-09 DIAGNOSIS — R05.3 CHRONIC COUGH: ICD-10-CM

## 2024-05-09 PROCEDURE — 92507 TX SP LANG VOICE COMM INDIV: CPT | Mod: GN | Performed by: SPEECH-LANGUAGE PATHOLOGIST

## 2024-05-09 NOTE — PROGRESS NOTES
[facility] VOICE CLINIC  VOICE / UPPER AIRWAY TREATMENT NOTE (CPT [id number])      Patient's name: Naila Talley  Date of Session: 5/9/2024  Providing SLP: Amanda Webster MS CCC-SLP  Referring Provider: Stephanie Doyle MD  Insurance Coverage: Medicare - [facility]  Total # of SLP Visits: 3  # of SLP Therapy Sessions: 3  Others in Attendance: her , Bill  Session Location: [facility]      Impressions from most recent evaluation on 1/30/24 by Christel Pride MM MA CCC-SLP:   Naila Talley is a 73 year old, presenting today with Globus Sensation (R09.89), Laryngeal Hyperfunction (J38.7), and Dysphonia (R49.0), as evidenced by evaluation the results of the evaluation and the laryngeal exam.    Remarkable findings included:  Perceptual evaluation demonstrated:   Roughness: Mild  Breathiness: WNL  Strain: WNL  Pitch:  F0/Habitual/Conversational speech:  mild  Laryngeal exam demonstrated: Essentially healthy larynx with mild bilateral bowing, and secondary supraglottic hyperfunction     Primary complaint of patient today included: Globus sensation and dysphonia     Therefore, we recommended a course of speech therapy to address these concerns.        STIMULABILITY: results of therapy probes during perceptual and laryngeal evaluation demonstrate improvement with use of forward resonant stimuli, coordination of respiration and phonation, and use of glottic coup to promote vocal fold closure     RECOMMENDATIONS:   A course of speech therapy is recommended to optimize vocal technique, improve voice quality, and promote reduced discomfort, effort and fatigue.  She demonstrates a Good prognosis for improvement given adherence to therapeutic recommendations.   Positive indicators: positive response to therapy probes diagnosis is known to respond to treatment high level of comittment  Negative indicators: none  Research: This patient is willing to be contacted about participation in  "research. May be eligible for cough and MTD study.         SUBJECTIVE:  Re-certification is required as part of Medicare requirements today. Previously-established goals will continue  Still coughing  Likes new pectin cough drops: [place] are better than Luden's  Nasal breathing improved with nasal rinses: less mucus in velopharyngeal port, thus some improvement in coughing  Has good and bad days with coughing: bad days when it's dry  Cough suppression  Effortful swallow helpful: 3-4 times resolves it but comes back quickly  Pulsed \"sh\" didn't help      OBJECTIVE/ASSESSMENT:    Patient Supplied Answers To Last 2 CSI Questionnaires      4/22/2024     2:02 PM 5/9/2024    12:49 PM   Cough Severity Index (CSI)   My cough is worse when I lie down 0 1   My coughing problem causes me to restrict my personal and social life 0 1   I tend to avoid places because of my cough problem 2 1   I feel embarrassed because of my coughing problem 4 2   People ask, ''What's wrong?'' because I cough a lot 2 2   I run out of air when I cough 2 2   My coughing problem affects my voice 2 3   My coughing problem limits my physical activity 2 1   My coughing problem upsets me 3 3   People ask me if I am sick because I cough a lot 2 1   CSI Score 19 17       THERAPEUTIC ACTIVITIES:  Cough and throat clearing reduction strategies are a behavioral treatment to replace chronic coughing/throat clearing behaviors with multiple effortful swallows, coughing and throat clearing without phonation, and rescue breathing, thus reducing laryngeal hypersensitivity. These exercises were reviewed today, and Naila performed them with moderate independent accuracy. She found the effortful swallows with water to be most helpful and performed these some of the time when needing to cough today. She will discontinue rescue breathing due to lack of benefit. She was reminded of the Worship throat clear to suppress throat clearing.    Semi-Occluded Vocal Tract Exercises " (SOVTs) are a series of exercises aimed to recoordinate respiration, phonation, and resonance to produce an effortless, clear voice. Such exercises include straw phonation with or without water resistance, lip trills, humming/Basic Training Gesture for Resonant Voice Therapy, and transoral lip buzz/Basic Training Gesture for Vocal Function Exercises at comfortable speaking pitches and glissando tasks. These exercises were instructed today, and Naila performed them with 60% accuracy with maximal clinician cueing and modeling. Adjustments were made to maintain high airflow across the repetition, as she was initially able to achieve strong airflow but had less momentum at the end of her exhale. She noted a relaxed sensation in her throat and appreciated nearly resolved coughing during the techniques and immediately after, outside of when glottal maher/increased straining was observed due to inappropriate airflow during the exercises.      IMPRESSIONS:   Globus Sensation (R09.89), Laryngeal Hyperfunction (J38.7), and Dysphonia (R49.0)     Naila had a productive session of voice therapy today. She has had some benefit from cough suppression techniques and will continue these. SOVT exercises (e.g. straw phonation with water resistance and humming/BTG for RVT) lead to reduced globus sensation and increased relaxation in the throat. Less coughing was observed surrounding instruction of these exercises       PLAN:  Naila will adhere to vocal hygiene recommendations, particularly nasal saline irrigation, continued systemic hydration, and non-menthol cough drops  Naila will utilize cough suppression strategies as needed between sessions  Naila will perform SOVT exercises for a total of 10-15 minutes per day between sessions  Written instructions were provided to aid home programming via [employer] and printed AVS  Naila continues to demonstrate adequate progress toward long- and short-term goals.  Continued voice therapy services  are recommended. Naila will follow-up for additional sessions on 24. Current goals will continue to be addressed.  Naila is in agreement with this plan of care.      SLP PLAN:  Voice SLP presence at next appointment with laryngologist (e.g. Dr. Doyle, Dr. Allen, Dr. Grimes) is unknown at this time, as she is just beginning a course of voice therapy. Next scheduled MD appointment is N/A.      BILLING SUMMARY:  Total treatment time: 48 minutes from 1:00 - 1:48 PM (including 8 minutes of chart review and preparation, interpretation of testing and therapeutic maneuvers, and document writing)  Speech Pathology Treatment ([id number])      MEDICARE CERTIFICATION DATES:  24 - 24      Amanda Webster MS CCC-SLP  Speech-Language Pathologist  [facility]  Department of Otolaryngology - Head and Neck Surgery  [facility]  [email or web address]  Direct: [phone]  [phone]    *This note may have been completed using vqczbw-bf-syio dictation software, so errors may exist. Please contact me for clarification if needed*

## 2024-05-09 NOTE — PROGRESS NOTES
Outpatient Speech Language Therapy Evaluation  PLAN OF TREATMENT FOR OUTPATIENT REHABILITATION  (COMPLETE FOR INITIAL CLAIMS ONLY)  Patient's Last Name, First Name, M.I.  YOB: 1950  Naila Talley                        Provider's Name  Amanda Webster, SLP Medical Record No.  [id number]                               Onset Date:  1/30/24   Start of Care Date: 5/09/24     Type: Speech Language Therapy Medical Diagnosis: Dysphonia [R49.0]                        Therapy Diagnosis:  Dysphonia [R49.0]   Visits from SOC:  1   _________________________________________________________________________________  Plan of Treatment:   Speech therapy    DURATION/FREQUENCY OF TREATMENT  Six weekly, one-hour sessions, with two monthly one-hour follow-up sessions    PROGNOSIS  Good prognosis for voice improvement with speech therapy and regular practice of therapeutic activities.    BARRIERS TO LEARNING/TEACHING AND LEARNING NEEDS  None/Unremarkable    GOALS:  Patient goal:   To improve and maintain a healthy voice quality  To understand the problem and fix it as much as possible  To have a normal and acceptable voice quality     Short-term goal(s): Within the first 4 sessions, Ms. Talley:  will demonstrate assigned laryngeal massage techniques with 80% accuracy or better with no clinician support  will utilize silent inhalation with good low-respiratory engagement 75% of the time during therapy tasks with minimal clinician support  will demonstrate semi-occluded vocal tract (SOVT) exercises with at least 80% accuracy with no clinician support     Long-term goal(s): In 6 months, Ms. Talley will:  Report resolution of dysphonia, and use of optimal voice quality to meet personal, social, and professional needs, 90% of the time during a typical week of vocal activities  Report resolution of symptoms, and use of optimal voice  quality and comfort to meet personal, social, and professional needs, 90% of the time during a typical week of vocal activities  _________________________________________________________________________________    I CERTIFY THE NEED FOR THESE SERVICES FURNISHED UNDER        THIS PLAN OF TREATMENT AND WHILE UNDER MY CARE     (Physician attestation of this document indicates review and certification of the therapy plan).     Certification date from: 5/09/24  Certification date to: 8/7/24    Referring Provider: Stephanie Doyle MD

## 2024-05-09 NOTE — PATIENT INSTRUCTIONS
"Cough suppression techniques    Consecutive hard swallows  Take a sip of water if your mouth is dry  Make sure your swallowing is effortful, like you're swallowing something dry or solid  Start at the first sign that you need to cough or throat clear (e.g. throat tickle, mucus, something stuck)  Keep swallowing until you feel like the urge to cough / throat clear has passed - often about 3 - 4 times    \"[facility] throat clear\"  Very light throat clearing to dislodge mucus  Like you're whispering \"eh eh eh\"          Semi-Occluded Vocal Tract Exercises         Sustain for 5-7 seconds each, total of 2-3 minutes, 5-6  mini  practice sessions per day    (You can do these as often as 1 minute per hour)         Straw Phonation with Water Resistance / Cup Bubbles  (1 inch of water)  Voice   Air stays on, add voice using  No   with round lips and forward placement  Goal: consistent, small bubbles  Pitch glides  Goal: No increase of bubbles when going up or down. Remember the  buzzy sound  - keep the voice forward  Humming / Resonant Voice Therapy  Hummmm: feel vibration on the lips without throat straining  [id number] (use any other vowels you): keep the vibrations forward and use the \"m\" as check-ins        "

## 2024-05-09 NOTE — LETTER
5/9/2024       RE: Naila Talley  [address]  [facility] [facility] [address]     Dear Colleague,    Thank you for referring your patient, Naila Talley, to the [facility] VOICE [facility]INIC [place] [facility]. Please see a copy of my visit note below.    [facility] VOICE [facility]  VOICE / UPPER AIRWAY TREATMENT NOTE (CPT 92476)      Patient's name: Naila Talley  Date of Session: 5/9/2024  Providing SLP: Amanda Webster MS CCC-SLP  Referring Provider: Stephanie Doyle MD  Ins[facility]age[facility]  Total # of SLP Visits: 3  # of SLP Therapy Sessions: 3  Others in Attendance: her , Bishop  Session Location: [facility]      Impressions from most recent evaluation on 1/30/24 by Christel Pride MM MA CCC-SLP:   Naila Talley is a 73 year old, presenting today with Globus Sensation (R09.89), Laryngeal Hyperfunction (J38.7), and Dysphonia (R49.0), as evidenced by evaluation the results of the evaluation and the laryngeal exam.    Remarkable findings included:  Perceptual evaluation demonstrated:   Roughness: Mild  Breathiness: WNL  Strain: WNL  Pitch:  F0/Habitual/Conversational speech:  mild  Laryngeal exam demonstrated: Essentially healthy larynx with mild bilateral bowing, and secondary supraglottic hyperfunction     Primary complaint of patient today included: Globus sensation and dysphonia     Therefore, we recommended a course of speech therapy to address these concerns.        STIMULABILITY: results of therapy probes during perceptual and laryngeal evaluation demonstrate improvement with use of forward resonant stimuli, coordination of respiration and phonation, and use of glottic coup to promote vocal fold closure     RECOMMENDATIONS:   A course of speech therapy is recommended to optimize vocal technique, improve voice quality, and promote reduced discomfort, effort and fatigue.  She demonstrates a Good  "prognosis for improvement given adherence to therapeutic recommendations.   Positive indicators: positive response to therapy probes diagnosis is known to respond to treatment high level of comittment  Negative indicators: none  Research: This patient is willing to be contacted about participation in research. May be eligible for cough and MTD study.         SUBJECTIVE:  Re-certification is required as part of Medicare requirements today. Previously-established goals will continue  Still coughing  Likes new pectin cough drops: Warren are better than Luden's  Nasal breathing improved with nasal rinses: less mucus in velopharyngeal port, thus some improvement in coughing  Has good and bad days with coughing: bad days when it's dry  Cough suppression  Effortful swallow helpful: 3-4 times resolves it but comes back quickly  Pulsed \"sh\" didn't help      OBJECTIVE/ASSESSMENT:    Patient Supplied Answers To Last 2 CSI Questionnaires      4/22/2024     2:02 PM 5/9/2024    12:49 PM   Cough Severity Index (CSI)   My cough is worse when I lie down 0 1   My coughing problem causes me to restrict my personal and social life 0 1   I tend to avoid places because of my cough problem 2 1   I feel embarrassed because of my coughing problem 4 2   People ask, ''What's wrong?'' because I cough a lot 2 2   I run out of air when I cough 2 2   My coughing problem affects my voice 2 3   My coughing problem limits my physical activity 2 1   My coughing problem upsets me 3 3   People ask me if I am sick because I cough a lot 2 1   CSI Score 19 17       THERAPEUTIC ACTIVITIES:  Cough and throat clearing reduction strategies are a behavioral treatment to replace chronic coughing/throat clearing behaviors with multiple effortful swallows, coughing and throat clearing without phonation, and rescue breathing, thus reducing laryngeal hypersensitivity. These exercises were reviewed today, and Naila performed them with moderate independent accuracy. " She found the effortful swallows with water to be most helpful and performed these some of the time when needing to cough today. She will discontinue rescue breathing due to lack of benefit. She was reminded of the Yazdanism throat clear to suppress throat clearing.    Semi-Occluded Vocal Tract Exercises (SOVTs) are a series of exercises aimed to recoordinate respiration, phonation, and resonance to produce an effortless, clear voice. Such exercises include straw phonation with or without water resistance, lip trills, humming/Basic Training Gesture for Resonant Voice Therapy, and transoral lip buzz/Basic Training Gesture for Vocal Function Exercises at comfortable speaking pitches and glissando tasks. These exercises were instructed today, and Naila performed them with 60% accuracy with maximal clinician cueing and modeling. Adjustments were made to maintain high airflow across the repetition, as she was initially able to achieve strong airflow but had less momentum at the end of her exhale. She noted a relaxed sensation in her throat and appreciated nearly resolved coughing during the techniques and immediately after, outside of when glottal maher/increased straining was observed due to inappropriate airflow during the exercises.      IMPRESSIONS:   Globus Sensation (R09.89), Laryngeal Hyperfunction (J38.7), and Dysphonia (R49.0)     Naila had a productive session of voice therapy today. She has had some benefit from cough suppression techniques and will continue these. SOVT exercises (e.g. straw phonation with water resistance and humming/BTG for RVT) lead to reduced globus sensation and increased relaxation in the throat. Less coughing was observed surrounding instruction of these exercises       PLAN:  Naila will adhere to vocal hygiene recommendations, particularly nasal saline irrigation, continued systemic hydration, and non-menthol cough drops  Naila will utilize cough suppression strategies as needed between  sessions  Naila will perform SOVT exercises for a total of 10-15 minutes per day between sessions  Written instructions were provided to aid home programming via MyChart and printed AVS  Naila continues to demonstrate adequate progress toward long- and short-term goals.  Continued voice therapy services are recommended. Naila will follow-up for additional sessions on 24. Current goals will continue to be addressed.  Naila is in agreement with this plan of care.      SLP PLAN:  Voice SLP presence at next appointment with laryngologist (e.g. Dr. Doyle, Dr. Allen, Dr. Grimes) is unknown at this time, as she is just beginning a course of voice therapy. Next scheduled MD appointment is N/A.      BILLING SUMMARY:  Total treatment time: 48 minutes from 1:00 - 1:48 PM (including 8 minutes of chart review and preparation, interpretation of testing and therapeutic maneuvers, and document writing)  Speech Pathology Treatment (97051)      MEDICARE CERTIFICATION DATES:  24 - 24      Amanda Webster MS CCC-SLP  Speech-Language Pathologist  [facility] Voice [facility]  Department of Otolaryngology - Head and Neck Surgery  [facility]  [email or web address]  Direct: [phone]  [phone]    *This note may have been completed using [id number] dictation software, so errors may exist. Please contact me for clarification if needed*                                                                                     Outpatient Speech Language Therapy Evaluation  PLAN OF TREATMENT FOR OUTPATIENT REHABILITATION  (COMPLETE FOR INITIAL CLAIMS ONLY)  Patient's Last Name, First Name, M.I.  YOB: 1950  GerriNaila  A                        Provider's Name  Amanda Webster, SLP Medical Record No.  [id number]                               Onset Date:  24   Start of Care Date: 24     Type: Speech Language Therapy Medical Diagnosis: Dysphonia [R49.0]                         Therapy Diagnosis:  Dysphonia [R49.0]   Visits from SOC:  1   _________________________________________________________________________________  Plan of Treatment:   Speech therapy    DURATION/FREQUENCY OF TREATMENT  Six weekly, one-hour sessions, with two monthly one-hour follow-up sessions    PROGNOSIS  Good prognosis for voice improvement with speech therapy and regular practice of therapeutic activities.    BARRIERS TO LEARNING/TEACHING AND LEARNING NEEDS  None/Unremarkable    GOALS:  Patient goal:   To improve and maintain a healthy voice quality  To understand the problem and fix it as much as possible  To have a normal and acceptable voice quality     Short-term goal(s): Within the first 4 sessions, Ms. Talley:  will demonstrate assigned laryngeal massage techniques with 80% accuracy or better with no clinician support  will utilize silent inhalation with good low-respiratory engagement 75% of the time during therapy tasks with minimal clinician support  will demonstrate semi-occluded vocal tract (SOVT) exercises with at least 80% accuracy with no clinician support     Long-term goal(s): In 6 months, Ms. Talley will:  Report resolution of dysphonia, and use of optimal voice quality to meet personal, social, and professional needs, 90% of the time during a typical week of vocal activities  Report resolution of symptoms, and use of optimal voice quality and comfort to meet personal, social, and professional needs, 90% of the time during a typical week of vocal activities  _________________________________________________________________________________    I CERTIFY THE NEED FOR THESE SERVICES FURNISHED UNDER        THIS PLAN OF TREATMENT AND WHILE UNDER MY CARE     (Physician attestation of this document indicates review and certification of the therapy plan).     Certification date from: 5/09/24  Certification date to: 8/7/24    Referring Provider: Stephanie Doyle MD         Again, thank you for  allowing me to participate in the care of your patient.      Sincerely,    Amanda Webster, SLP

## 2024-05-12 ENCOUNTER — LAB REQUISITION (OUTPATIENT)
Dept: LAB | Facility: CLINIC | Age: 74
End: 2024-05-12
Payer: MEDICARE

## 2024-05-12 DIAGNOSIS — R30.0 DYSURIA: ICD-10-CM

## 2024-05-12 PROCEDURE — 87086 URINE CULTURE/COLONY COUNT: CPT | Mod: ORL | Performed by: NURSE PRACTITIONER

## 2024-05-12 PROCEDURE — 87186 SC STD MICRODIL/AGAR DIL: CPT | Mod: ORL | Performed by: NURSE PRACTITIONER

## 2024-05-13 LAB — BACTERIA UR CULT: ABNORMAL

## 2024-06-20 ENCOUNTER — LAB REQUISITION (OUTPATIENT)
Dept: LAB | Facility: CLINIC | Age: 74
End: 2024-06-20
Payer: MEDICARE

## 2024-06-20 DIAGNOSIS — R30.0 DYSURIA: ICD-10-CM

## 2024-06-20 PROCEDURE — 87086 URINE CULTURE/COLONY COUNT: CPT | Mod: ORL | Performed by: FAMILY MEDICINE

## 2024-06-20 PROCEDURE — 87186 SC STD MICRODIL/AGAR DIL: CPT | Mod: ORL | Performed by: FAMILY MEDICINE

## 2024-06-21 LAB — BACTERIA UR CULT: ABNORMAL

## 2024-07-05 ENCOUNTER — LAB REQUISITION (OUTPATIENT)
Dept: LAB | Facility: CLINIC | Age: 74
End: 2024-07-05
Payer: MEDICARE

## 2024-07-05 DIAGNOSIS — R82.998 OTHER ABNORMAL FINDINGS IN URINE: ICD-10-CM

## 2024-07-05 PROCEDURE — 87086 URINE CULTURE/COLONY COUNT: CPT | Mod: ORL | Performed by: STUDENT IN AN ORGANIZED HEALTH CARE EDUCATION/TRAINING PROGRAM

## 2024-07-07 LAB — BACTERIA UR CULT: NORMAL

## 2025-01-29 ENCOUNTER — LAB REQUISITION (OUTPATIENT)
Dept: LAB | Facility: CLINIC | Age: 75
End: 2025-01-29
Payer: MEDICARE

## 2025-01-29 DIAGNOSIS — R82.90 UNSPECIFIED ABNORMAL FINDINGS IN URINE: ICD-10-CM

## 2025-01-29 DIAGNOSIS — I10 ESSENTIAL (PRIMARY) HYPERTENSION: ICD-10-CM

## 2025-01-29 DIAGNOSIS — E78.2 MIXED HYPERLIPIDEMIA: ICD-10-CM

## 2025-01-29 LAB
ALBUMIN SERPL BCG-MCNC: 4.5 G/DL (ref 3.5–5.2)
ALP SERPL-CCNC: 67 U/L (ref 40–150)
ALT SERPL W P-5'-P-CCNC: 7 U/L (ref 0–50)
ANION GAP SERPL CALCULATED.3IONS-SCNC: 10 MMOL/L (ref 7–15)
AST SERPL W P-5'-P-CCNC: 25 U/L (ref 0–45)
BILIRUB SERPL-MCNC: 0.3 MG/DL
BUN SERPL-MCNC: 13.6 MG/DL (ref 8–23)
CALCIUM SERPL-MCNC: 10 MG/DL (ref 8.8–10.4)
CHLORIDE SERPL-SCNC: 106 MMOL/L (ref 98–107)
CHOLEST SERPL-MCNC: 140 MG/DL
CREAT SERPL-MCNC: 0.8 MG/DL (ref 0.51–0.95)
EGFRCR SERPLBLD CKD-EPI 2021: 76 ML/MIN/1.73M2
FASTING STATUS PATIENT QL REPORTED: ABNORMAL
FASTING STATUS PATIENT QL REPORTED: NORMAL
GLUCOSE SERPL-MCNC: 109 MG/DL (ref 70–99)
HCO3 SERPL-SCNC: 28 MMOL/L (ref 22–29)
HDLC SERPL-MCNC: 53 MG/DL
LDLC SERPL CALC-MCNC: 62 MG/DL
NONHDLC SERPL-MCNC: 87 MG/DL
POTASSIUM SERPL-SCNC: 4.3 MMOL/L (ref 3.4–5.3)
PROT SERPL-MCNC: 7.3 G/DL (ref 6.4–8.3)
SODIUM SERPL-SCNC: 144 MMOL/L (ref 135–145)
TRIGL SERPL-MCNC: 124 MG/DL

## 2025-01-29 PROCEDURE — 80061 LIPID PANEL: CPT | Mod: ORL | Performed by: FAMILY MEDICINE

## 2025-01-29 PROCEDURE — 80053 COMPREHEN METABOLIC PANEL: CPT | Mod: ORL | Performed by: FAMILY MEDICINE

## 2025-01-29 PROCEDURE — 87086 URINE CULTURE/COLONY COUNT: CPT | Mod: ORL | Performed by: FAMILY MEDICINE

## 2025-01-30 LAB — BACTERIA UR CULT: NORMAL

## 2025-07-08 ENCOUNTER — OFFICE VISIT (OUTPATIENT)
Dept: PHARMACY | Facility: PHYSICIAN GROUP | Age: 75
End: 2025-07-08

## 2025-07-08 VITALS — SYSTOLIC BLOOD PRESSURE: 143 MMHG | DIASTOLIC BLOOD PRESSURE: 82 MMHG

## 2025-07-08 DIAGNOSIS — Z78.9 TAKES DIETARY SUPPLEMENTS: ICD-10-CM

## 2025-07-08 DIAGNOSIS — R23.8 SKIN IRRITATION: ICD-10-CM

## 2025-07-08 DIAGNOSIS — M79.7 FIBROMYALGIA: Primary | ICD-10-CM

## 2025-07-08 DIAGNOSIS — R52 PAIN: ICD-10-CM

## 2025-07-08 DIAGNOSIS — I10 ESSENTIAL HYPERTENSION: ICD-10-CM

## 2025-07-08 DIAGNOSIS — G47.00 INSOMNIA, UNSPECIFIED TYPE: ICD-10-CM

## 2025-07-08 DIAGNOSIS — E78.5 HYPERLIPIDEMIA LDL GOAL <100: ICD-10-CM

## 2025-07-08 PROCEDURE — 99207 PR NO CHARGE LOS: CPT | Performed by: PHARMACIST

## 2025-07-08 RX ORDER — ACETAMINOPHEN 500 MG
500-1000 TABLET ORAL EVERY 6 HOURS PRN
COMMUNITY

## 2025-07-08 RX ORDER — PHENOL 1.4 %
10 AEROSOL, SPRAY (ML) MUCOUS MEMBRANE AT BEDTIME
COMMUNITY

## 2025-07-08 RX ORDER — DULOXETIN HYDROCHLORIDE 30 MG/1
30 CAPSULE, DELAYED RELEASE ORAL DAILY
COMMUNITY

## 2025-07-08 RX ORDER — NAPHAZOLINE HCL 0.012 %
1 DROPS OPHTHALMIC (EYE) DAILY PRN
COMMUNITY

## 2025-07-08 RX ORDER — NAPROXEN SODIUM 220 MG/1
220 TABLET, FILM COATED ORAL 2 TIMES DAILY PRN
COMMUNITY

## 2025-07-08 RX ORDER — CHLORPHENIR/PHENYLEPH/ASPIRIN 2-7.8-325
1 TABLET, EFFERVESCENT ORAL DAILY
COMMUNITY

## 2025-07-08 RX ORDER — LIDOCAINE 4 G/G
1 PATCH TOPICAL EVERY 24 HOURS
COMMUNITY

## 2025-07-08 RX ORDER — CALCIUM CITRATE/VITAMIN D3 315MG-6.25
1 TABLET ORAL DAILY
COMMUNITY

## 2025-07-08 RX ORDER — ADHESIVE TAPE 3"X 2.3 YD
500 TAPE, NON-MEDICATED TOPICAL DAILY
COMMUNITY

## 2025-07-08 NOTE — PATIENT INSTRUCTIONS
Recommendations from today's MTM visit:                                                    MTM (medication therapy management) is a service provided by a clinical pharmacist designed to help you get the most of out of your medicines.   Today we reviewed what your medicines are for, how to know if they are working, that your medicines are safe and how to make your medicine regimen as easy as possible.      Medications:  - Continue taking duloxetine 30 mg once daily   - Continue taking rosuvastatin 5 mg at bedtime for now, I will talk with Dr. Adam about a trial off or at reduced dose  - Try only using cyclobenzaprine as needed in the morning, continue taking at bedtime  - Take Tylenol (acetaminophen) as preferred for pain as needed, up to 3000 mg daily  - Avoid taking naproxen (Aleve) regularly due to blood pressure concerns  - Try lidocaine patches for sciatica pain    - Look for 4% lidocaine patches at the drug store    - Apply before activities that may trigger pain    - Can be left on for up to 12 hours    Monitoring:  - Check blood pressure at home regularly    - Consider using a wrist blood pressure monitor if arm cuff is too tight    Future medications to consider:  - Gabapentin- helps with pain and sleep  - Blood pressure medication if your readings at home and in clinic continue to be high    Follow-up:  - Check in with Dr. Adam in 6 weeks  - MTM pharmacist in the future as needed    It was great speaking with you today.  I value your experience and would be very thankful for your time in providing feedback in our clinic survey. In the next few days, you may receive an email or text message from [employer] with a link to a survey related to your clinical pharmacist.    To schedule another MTM appointment, please call [phone].    My Clinical Pharmacist's contact information:                                                      Please feel free to contact me with any questions or concerns you have.       Camille Retana, PharmD, [facility]  Medication Therapy Management Pharmacist  [facility]  [phone]

## 2025-08-19 ENCOUNTER — LAB REQUISITION (OUTPATIENT)
Dept: LAB | Facility: CLINIC | Age: 75
End: 2025-08-19
Payer: MEDICARE

## 2025-08-19 DIAGNOSIS — I10 ESSENTIAL (PRIMARY) HYPERTENSION: ICD-10-CM

## 2025-08-19 DIAGNOSIS — E78.2 MIXED HYPERLIPIDEMIA: ICD-10-CM

## 2025-08-19 DIAGNOSIS — R63.4 ABNORMAL WEIGHT LOSS: ICD-10-CM

## 2025-08-19 LAB
ERYTHROCYTE [DISTWIDTH] IN BLOOD BY AUTOMATED COUNT: 14.2 % (ref 10–15)
HCT VFR BLD AUTO: 38.9 % (ref 35–47)
HGB BLD-MCNC: 12.1 G/DL (ref 11.7–15.7)
MCH RBC QN AUTO: 30.9 PG (ref 26.5–33)
MCHC RBC AUTO-ENTMCNC: 31.1 G/DL (ref 31.5–36.5)
MCV RBC AUTO: 99.5 FL (ref 78–100)
PLATELET # BLD AUTO: 300 10E3/UL (ref 150–450)
RBC # BLD AUTO: 3.91 10E6/UL (ref 3.8–5.2)
WBC # BLD AUTO: 6.93 10E3/UL (ref 4–11)

## 2025-08-20 LAB
ALBUMIN SERPL BCG-MCNC: 4.5 G/DL (ref 3.5–5.2)
ALP SERPL-CCNC: 69 U/L (ref 40–150)
ALT SERPL W P-5'-P-CCNC: 8 U/L (ref 0–50)
ANION GAP SERPL CALCULATED.3IONS-SCNC: 12 MMOL/L (ref 7–15)
AST SERPL W P-5'-P-CCNC: 22 U/L (ref 0–45)
BILIRUB SERPL-MCNC: 0.2 MG/DL
BUN SERPL-MCNC: 13.3 MG/DL (ref 8–23)
CALCIUM SERPL-MCNC: 9.8 MG/DL (ref 8.8–10.4)
CHLORIDE SERPL-SCNC: 102 MMOL/L (ref 98–107)
CREAT SERPL-MCNC: 0.81 MG/DL (ref 0.51–0.95)
EGFRCR SERPLBLD CKD-EPI 2021: 74 ML/MIN/1.73M2
GLUCOSE SERPL-MCNC: 107 MG/DL (ref 70–99)
HCO3 SERPL-SCNC: 27 MMOL/L (ref 22–29)
POTASSIUM SERPL-SCNC: 4.4 MMOL/L (ref 3.4–5.3)
PROT SERPL-MCNC: 7.4 G/DL (ref 6.4–8.3)
SODIUM SERPL-SCNC: 141 MMOL/L (ref 135–145)
TSH SERPL DL<=0.005 MIU/L-ACNC: 1.61 UIU/ML (ref 0.3–4.2)

## (undated) DEVICE — DAVINCI XI DRAPE ARM 470015

## (undated) DEVICE — SOL WATER IRRIG 3000ML BAG 2B7117

## (undated) DEVICE — DAVINCI XI ESU FCP BIPOLAR MARYLAND 470172

## (undated) DEVICE — ENDO FORCEP ENDOJAW BIOPSY 2.8MMX230CM FB-220U

## (undated) DEVICE — DAVINCI XI OBTURATOR BLADELESS 8MM 470359

## (undated) DEVICE — TUBING CONMED AIRSEAL SMOKE EVAC INSUFFLATION ASM-EVAC

## (undated) DEVICE — TUBING IRRIG TUR Y TYPE 96" LF 6543-01

## (undated) DEVICE — BLADE CLIPPER 4406

## (undated) DEVICE — GOWN LG DISP 9515

## (undated) DEVICE — ENDO TROCAR FIRST ENTRY KII FIOS Z-THRD 05X100MM CTF03

## (undated) DEVICE — SPONGE RAY-TEC 4X8" 7318

## (undated) DEVICE — DAVINCI XI DRAPE COLUMN 470341

## (undated) DEVICE — ESU PENCIL W/HOLSTER E2350H

## (undated) DEVICE — SUCTION IRR STRYKERFLOW II W/TIP 250-070-520

## (undated) DEVICE — SU ETHIBOND 2-0 SHDA 30" X563H

## (undated) DEVICE — DRAPE MAYO STAND 23X54 8337

## (undated) DEVICE — DAVINCI XI SEAL UNIVERSAL 5-8MM 470361

## (undated) DEVICE — LINEN TOWEL PACK X5 5464

## (undated) DEVICE — GLOVE BIOGEL PI MICRO INDICATOR UNDERGLOVE SZ 6.5 48965

## (undated) DEVICE — SU PDS II 3-0 SH 27" Z316H

## (undated) DEVICE — ENDO POUCH UNIVERSAL RETRIEVAL SYSTEM INZII 5MM CD003

## (undated) DEVICE — SU MONOCRYL 4-0 PS-2 18" UND Y496G

## (undated) DEVICE — SPONGE PACK VAGINAL 2"X9

## (undated) DEVICE — SYR 10ML FINGER CONTROL W/O NDL 309695

## (undated) DEVICE — TUBING SUCTION 12"X1/4" N612

## (undated) DEVICE — ADH SKIN CLOSURE PREMIERPRO EXOFIN MICOR HV 0.5ML 3471

## (undated) DEVICE — SYSTEM LAPAROVUE VISIBILITY LAPVUE10

## (undated) DEVICE — DAVINCI HOT SHEARS TIP COVER  400180

## (undated) DEVICE — SU VICRYL 2-0 SH 27" J317H

## (undated) DEVICE — SOL NACL 0.9% INJ 1000ML BAG 2B1324X

## (undated) DEVICE — ESU GROUND PAD UNIVERSAL W/O CORD

## (undated) DEVICE — Device

## (undated) DEVICE — NDL INSUFFLATION 13GA 120MM C2201

## (undated) DEVICE — DAVINCI XI NDL DRIVER LARGE 470006

## (undated) DEVICE — SU VICRYL 2-0 CT-2 27" J333H

## (undated) DEVICE — KIT ENDO TURNOVER/PROCEDURE W/CLEAN A SCOPE LINERS 103888

## (undated) DEVICE — KIT PATIENT POSITIONING PIGAZZI LATEX FREE 40580

## (undated) DEVICE — SU PROLENE 2-0 SHDA 48" 8533H

## (undated) DEVICE — DAVINCI XI GRASPER ENDOWRIST BIPOLAR LONG 470400

## (undated) DEVICE — DRAPE IOBAN INCISE 23X17" 6650EZ

## (undated) DEVICE — ENDO TROCAR CONMED AIRSEAL BLADELESS 05X120MM IAS5-120LP

## (undated) DEVICE — PACK DAVINCI GYN SMA15GDFS1

## (undated) DEVICE — SUCTION CANISTER MEDIVAC LINER 3000ML W/LID 65651-530

## (undated) DEVICE — SU VICRYL 0 UR-6 27" J603H

## (undated) DEVICE — SOL NACL 0.9% IRRIG 1000ML BOTTLE 2F7124

## (undated) DEVICE — CATH TRAY FOLEY SURESTEP 16FR WDRAIN BAG STLK LATEX A300316A

## (undated) RX ORDER — PROPOFOL 10 MG/ML
INJECTION, EMULSION INTRAVENOUS
Status: DISPENSED
Start: 2023-05-24

## (undated) RX ORDER — BUPIVACAINE HYDROCHLORIDE 2.5 MG/ML
INJECTION, SOLUTION EPIDURAL; INFILTRATION; INTRACAUDAL
Status: DISPENSED
Start: 2023-05-24

## (undated) RX ORDER — SIMETHICONE 40MG/0.6ML
SUSPENSION, DROPS(FINAL DOSAGE FORM)(ML) ORAL
Status: DISPENSED
Start: 2023-05-23

## (undated) RX ORDER — ACETAMINOPHEN 325 MG/1
TABLET ORAL
Status: DISPENSED
Start: 2023-05-24

## (undated) RX ORDER — EPINEPHRINE 1 MG/ML
INJECTION, SOLUTION INTRAMUSCULAR; SUBCUTANEOUS
Status: DISPENSED
Start: 2023-05-24

## (undated) RX ORDER — NEOSTIGMINE METHYLSULFATE 1 MG/ML
VIAL (ML) INJECTION
Status: DISPENSED
Start: 2023-05-24

## (undated) RX ORDER — DEXAMETHASONE SODIUM PHOSPHATE 4 MG/ML
INJECTION, SOLUTION INTRA-ARTICULAR; INTRALESIONAL; INTRAMUSCULAR; INTRAVENOUS; SOFT TISSUE
Status: DISPENSED
Start: 2023-05-24

## (undated) RX ORDER — FENTANYL CITRATE 50 UG/ML
INJECTION, SOLUTION INTRAMUSCULAR; INTRAVENOUS
Status: DISPENSED
Start: 2023-05-23

## (undated) RX ORDER — GLYCOPYRROLATE 0.2 MG/ML
INJECTION, SOLUTION INTRAMUSCULAR; INTRAVENOUS
Status: DISPENSED
Start: 2023-05-24

## (undated) RX ORDER — METRONIDAZOLE 500 MG/100ML
INJECTION, SOLUTION INTRAVENOUS
Status: DISPENSED
Start: 2023-05-24

## (undated) RX ORDER — ONDANSETRON 2 MG/ML
INJECTION INTRAMUSCULAR; INTRAVENOUS
Status: DISPENSED
Start: 2023-05-24

## (undated) RX ORDER — HYDROMORPHONE HYDROCHLORIDE 1 MG/ML
INJECTION, SOLUTION INTRAMUSCULAR; INTRAVENOUS; SUBCUTANEOUS
Status: DISPENSED
Start: 2023-05-24

## (undated) RX ORDER — PHENAZOPYRIDINE HYDROCHLORIDE 200 MG/1
TABLET, FILM COATED ORAL
Status: DISPENSED
Start: 2023-05-24

## (undated) RX ORDER — FENTANYL CITRATE 50 UG/ML
INJECTION, SOLUTION INTRAMUSCULAR; INTRAVENOUS
Status: DISPENSED
Start: 2023-05-24